# Patient Record
Sex: MALE | Race: WHITE | Employment: OTHER | URBAN - METROPOLITAN AREA
[De-identification: names, ages, dates, MRNs, and addresses within clinical notes are randomized per-mention and may not be internally consistent; named-entity substitution may affect disease eponyms.]

---

## 2018-12-27 ENCOUNTER — HOSPITAL ENCOUNTER (OUTPATIENT)
Age: 53
Setting detail: OBSERVATION
Discharge: NURSING, REHAB OR LONG-TERM CARE | End: 2018-12-28
Attending: EMERGENCY MEDICINE | Admitting: INTERNAL MEDICINE
Payer: MEDICAID

## 2018-12-27 ENCOUNTER — APPOINTMENT (OUTPATIENT)
Dept: GENERAL RADIOLOGY | Age: 53
End: 2018-12-27
Payer: MEDICAID

## 2018-12-27 DIAGNOSIS — R77.8 ELEVATED TROPONIN: ICD-10-CM

## 2018-12-27 DIAGNOSIS — R07.9 CHEST PAIN, UNSPECIFIED TYPE: Primary | ICD-10-CM

## 2018-12-27 LAB
A/G RATIO: 1.1 (ref 1.1–2.2)
ALBUMIN SERPL-MCNC: 3.8 G/DL (ref 3.4–5)
ALP BLD-CCNC: 139 U/L (ref 40–129)
ALT SERPL-CCNC: 18 U/L (ref 10–40)
ANION GAP SERPL CALCULATED.3IONS-SCNC: 12 MMOL/L (ref 3–16)
AST SERPL-CCNC: 15 U/L (ref 15–37)
BASOPHILS ABSOLUTE: 0.1 K/UL (ref 0–0.2)
BASOPHILS RELATIVE PERCENT: 0.6 %
BILIRUB SERPL-MCNC: 0.3 MG/DL (ref 0–1)
BUN BLDV-MCNC: 12 MG/DL (ref 7–20)
CALCIUM SERPL-MCNC: 8.7 MG/DL (ref 8.3–10.6)
CHLORIDE BLD-SCNC: 100 MMOL/L (ref 99–110)
CO2: 25 MMOL/L (ref 21–32)
CREAT SERPL-MCNC: 0.5 MG/DL (ref 0.9–1.3)
EOSINOPHILS ABSOLUTE: 0 K/UL (ref 0–0.6)
EOSINOPHILS RELATIVE PERCENT: 0.1 %
GFR AFRICAN AMERICAN: >60
GFR NON-AFRICAN AMERICAN: >60
GLOBULIN: 3.5 G/DL
GLUCOSE BLD-MCNC: 148 MG/DL (ref 70–99)
GLUCOSE BLD-MCNC: 171 MG/DL (ref 70–99)
HCT VFR BLD CALC: 34.6 % (ref 40.5–52.5)
HEMOGLOBIN: 11.2 G/DL (ref 13.5–17.5)
INR BLD: 2.68 (ref 0.86–1.14)
LYMPHOCYTES ABSOLUTE: 0.7 K/UL (ref 1–5.1)
LYMPHOCYTES RELATIVE PERCENT: 6.3 %
MCH RBC QN AUTO: 28.9 PG (ref 26–34)
MCHC RBC AUTO-ENTMCNC: 32.5 G/DL (ref 31–36)
MCV RBC AUTO: 88.9 FL (ref 80–100)
MONOCYTES ABSOLUTE: 0.3 K/UL (ref 0–1.3)
MONOCYTES RELATIVE PERCENT: 2.7 %
NEUTROPHILS ABSOLUTE: 9.7 K/UL (ref 1.7–7.7)
NEUTROPHILS RELATIVE PERCENT: 90.3 %
PDW BLD-RTO: 19.2 % (ref 12.4–15.4)
PERFORMED ON: ABNORMAL
PLATELET # BLD: 268 K/UL (ref 135–450)
PMV BLD AUTO: 9.7 FL (ref 5–10.5)
POTASSIUM REFLEX MAGNESIUM: 3.9 MMOL/L (ref 3.5–5.1)
PROTHROMBIN TIME: 30.6 SEC (ref 9.8–13)
RBC # BLD: 3.89 M/UL (ref 4.2–5.9)
SODIUM BLD-SCNC: 137 MMOL/L (ref 136–145)
TOTAL PROTEIN: 7.3 G/DL (ref 6.4–8.2)
TROPONIN: 0.03 NG/ML
TROPONIN: <0.01 NG/ML
WBC # BLD: 10.8 K/UL (ref 4–11)

## 2018-12-27 PROCEDURE — 93005 ELECTROCARDIOGRAM TRACING: CPT | Performed by: NURSE PRACTITIONER

## 2018-12-27 PROCEDURE — G0378 HOSPITAL OBSERVATION PER HR: HCPCS

## 2018-12-27 PROCEDURE — 85610 PROTHROMBIN TIME: CPT

## 2018-12-27 PROCEDURE — 6370000000 HC RX 637 (ALT 250 FOR IP): Performed by: NURSE PRACTITIONER

## 2018-12-27 PROCEDURE — 85025 COMPLETE CBC W/AUTO DIFF WBC: CPT

## 2018-12-27 PROCEDURE — 99285 EMERGENCY DEPT VISIT HI MDM: CPT

## 2018-12-27 PROCEDURE — 80053 COMPREHEN METABOLIC PANEL: CPT

## 2018-12-27 PROCEDURE — 84484 ASSAY OF TROPONIN QUANT: CPT

## 2018-12-27 PROCEDURE — 71046 X-RAY EXAM CHEST 2 VIEWS: CPT

## 2018-12-27 PROCEDURE — 36415 COLL VENOUS BLD VENIPUNCTURE: CPT

## 2018-12-27 RX ORDER — CETIRIZINE HYDROCHLORIDE 10 MG/1
10 TABLET ORAL DAILY
Status: DISCONTINUED | OUTPATIENT
Start: 2018-12-28 | End: 2018-12-28 | Stop reason: HOSPADM

## 2018-12-27 RX ORDER — BENZONATATE 100 MG/1
100 CAPSULE ORAL EVERY 6 HOURS PRN
Status: ON HOLD | COMMUNITY
End: 2020-03-27

## 2018-12-27 RX ORDER — BACLOFEN 10 MG/1
10 TABLET ORAL 3 TIMES DAILY
COMMUNITY

## 2018-12-27 RX ORDER — WARFARIN SODIUM 5 MG/1
5 TABLET ORAL EVERY EVENING
Status: ON HOLD | COMMUNITY
End: 2020-03-27

## 2018-12-27 RX ORDER — FAMOTIDINE 20 MG/1
20 TABLET, FILM COATED ORAL EVERY EVENING
COMMUNITY

## 2018-12-27 RX ORDER — ATENOLOL 25 MG/1
25 TABLET ORAL DAILY
Status: DISCONTINUED | OUTPATIENT
Start: 2018-12-28 | End: 2018-12-28 | Stop reason: HOSPADM

## 2018-12-27 RX ORDER — LOSARTAN POTASSIUM 25 MG/1
25 TABLET ORAL DAILY
COMMUNITY

## 2018-12-27 RX ORDER — OXYCODONE HYDROCHLORIDE AND ACETAMINOPHEN 5; 325 MG/1; MG/1
2 TABLET ORAL EVERY 4 HOURS PRN
Status: DISCONTINUED | OUTPATIENT
Start: 2018-12-27 | End: 2018-12-28 | Stop reason: HOSPADM

## 2018-12-27 RX ORDER — MIRTAZAPINE 15 MG/1
15 TABLET, FILM COATED ORAL NIGHTLY
Status: ON HOLD | COMMUNITY
End: 2020-03-27

## 2018-12-27 RX ORDER — DEXTROSE MONOHYDRATE 25 G/50ML
12.5 INJECTION, SOLUTION INTRAVENOUS PRN
Status: DISCONTINUED | OUTPATIENT
Start: 2018-12-27 | End: 2018-12-28 | Stop reason: HOSPADM

## 2018-12-27 RX ORDER — ATENOLOL 25 MG/1
25 TABLET ORAL DAILY
COMMUNITY

## 2018-12-27 RX ORDER — LOSARTAN POTASSIUM 25 MG/1
25 TABLET ORAL DAILY
Status: DISCONTINUED | OUTPATIENT
Start: 2018-12-28 | End: 2018-12-28 | Stop reason: HOSPADM

## 2018-12-27 RX ORDER — FERROUS SULFATE 325(65) MG
325 TABLET ORAL
Status: ON HOLD | COMMUNITY
Start: 2018-12-22 | End: 2020-03-27

## 2018-12-27 RX ORDER — ASPIRIN 81 MG/1
324 TABLET, CHEWABLE ORAL ONCE
Status: DISCONTINUED | OUTPATIENT
Start: 2018-12-27 | End: 2018-12-27 | Stop reason: HOSPADM

## 2018-12-27 RX ORDER — M-VIT,TX,IRON,MINS/CALC/FOLIC 27MG-0.4MG
1 TABLET ORAL DAILY
Status: DISCONTINUED | OUTPATIENT
Start: 2018-12-28 | End: 2018-12-28 | Stop reason: HOSPADM

## 2018-12-27 RX ORDER — LANOLIN ALCOHOL/MO/W.PET/CERES
3 CREAM (GRAM) TOPICAL EVERY EVENING
Status: DISCONTINUED | OUTPATIENT
Start: 2018-12-27 | End: 2018-12-28 | Stop reason: HOSPADM

## 2018-12-27 RX ORDER — FUROSEMIDE 20 MG/1
20 TABLET ORAL DAILY
Status: DISCONTINUED | OUTPATIENT
Start: 2018-12-28 | End: 2018-12-28 | Stop reason: HOSPADM

## 2018-12-27 RX ORDER — NITROGLYCERIN 0.4 MG/1
0.4 TABLET SUBLINGUAL EVERY 5 MIN PRN
Status: DISCONTINUED | OUTPATIENT
Start: 2018-12-27 | End: 2018-12-28 | Stop reason: HOSPADM

## 2018-12-27 RX ORDER — ASCORBIC ACID 500 MG
500 TABLET ORAL 3 TIMES DAILY
Status: ON HOLD | COMMUNITY
End: 2020-03-27

## 2018-12-27 RX ORDER — GABAPENTIN 300 MG/1
600 CAPSULE ORAL 3 TIMES DAILY
Status: DISCONTINUED | OUTPATIENT
Start: 2018-12-27 | End: 2018-12-28 | Stop reason: HOSPADM

## 2018-12-27 RX ORDER — DEXTROSE MONOHYDRATE 50 MG/ML
100 INJECTION, SOLUTION INTRAVENOUS PRN
Status: DISCONTINUED | OUTPATIENT
Start: 2018-12-27 | End: 2018-12-28 | Stop reason: HOSPADM

## 2018-12-27 RX ORDER — LOPERAMIDE HYDROCHLORIDE 2 MG/1
2 CAPSULE ORAL 4 TIMES DAILY PRN
COMMUNITY

## 2018-12-27 RX ORDER — SODIUM CHLORIDE 0.9 % (FLUSH) 0.9 %
10 SYRINGE (ML) INJECTION PRN
Status: DISCONTINUED | OUTPATIENT
Start: 2018-12-27 | End: 2018-12-28 | Stop reason: HOSPADM

## 2018-12-27 RX ORDER — ATORVASTATIN CALCIUM 20 MG/1
20 TABLET, FILM COATED ORAL NIGHTLY
COMMUNITY

## 2018-12-27 RX ORDER — WARFARIN SODIUM 4 MG/1
4 TABLET ORAL EVERY EVENING
Status: ON HOLD | COMMUNITY
End: 2020-03-27

## 2018-12-27 RX ORDER — M-VIT,TX,IRON,MINS/CALC/FOLIC 27MG-0.4MG
1 TABLET ORAL DAILY
COMMUNITY

## 2018-12-27 RX ORDER — OXYCODONE HYDROCHLORIDE AND ACETAMINOPHEN 5; 325 MG/1; MG/1
1 TABLET ORAL EVERY 4 HOURS PRN
Status: DISCONTINUED | OUTPATIENT
Start: 2018-12-27 | End: 2018-12-28 | Stop reason: HOSPADM

## 2018-12-27 RX ORDER — OXYCODONE HYDROCHLORIDE AND ACETAMINOPHEN 5; 325 MG/1; MG/1
1 TABLET ORAL EVERY 8 HOURS PRN
COMMUNITY

## 2018-12-27 RX ORDER — ASPIRIN 81 MG/1
81 TABLET, CHEWABLE ORAL DAILY
Status: DISCONTINUED | OUTPATIENT
Start: 2018-12-28 | End: 2018-12-28 | Stop reason: HOSPADM

## 2018-12-27 RX ORDER — ATORVASTATIN CALCIUM 20 MG/1
20 TABLET, FILM COATED ORAL NIGHTLY
Status: DISCONTINUED | OUTPATIENT
Start: 2018-12-27 | End: 2018-12-28 | Stop reason: HOSPADM

## 2018-12-27 RX ORDER — LANOLIN ALCOHOL/MO/W.PET/CERES
3 CREAM (GRAM) TOPICAL EVERY EVENING
COMMUNITY

## 2018-12-27 RX ORDER — INSULIN GLARGINE 100 [IU]/ML
35 INJECTION, SOLUTION SUBCUTANEOUS NIGHTLY
Status: DISCONTINUED | OUTPATIENT
Start: 2018-12-27 | End: 2018-12-28 | Stop reason: HOSPADM

## 2018-12-27 RX ORDER — NICOTINE POLACRILEX 4 MG
15 LOZENGE BUCCAL PRN
Status: DISCONTINUED | OUTPATIENT
Start: 2018-12-27 | End: 2018-12-28 | Stop reason: HOSPADM

## 2018-12-27 RX ORDER — TRAZODONE HYDROCHLORIDE 50 MG/1
12.5 TABLET ORAL NIGHTLY
COMMUNITY

## 2018-12-27 RX ORDER — SODIUM CHLORIDE 0.9 % (FLUSH) 0.9 %
10 SYRINGE (ML) INJECTION EVERY 12 HOURS SCHEDULED
Status: DISCONTINUED | OUTPATIENT
Start: 2018-12-27 | End: 2018-12-28 | Stop reason: HOSPADM

## 2018-12-27 RX ORDER — ASCORBIC ACID 500 MG
500 TABLET ORAL 3 TIMES DAILY
Status: DISCONTINUED | OUTPATIENT
Start: 2018-12-27 | End: 2018-12-28 | Stop reason: HOSPADM

## 2018-12-27 RX ORDER — ATORVASTATIN CALCIUM 40 MG/1
40 TABLET, FILM COATED ORAL NIGHTLY
Status: DISCONTINUED | OUTPATIENT
Start: 2018-12-27 | End: 2018-12-28 | Stop reason: HOSPADM

## 2018-12-27 RX ORDER — ACETAMINOPHEN 325 MG/1
325-650 TABLET ORAL EVERY 6 HOURS PRN
Status: ON HOLD | COMMUNITY
End: 2020-03-27

## 2018-12-27 RX ORDER — OXYBUTYNIN CHLORIDE 5 MG/1
5 TABLET, EXTENDED RELEASE ORAL DAILY
Status: DISCONTINUED | OUTPATIENT
Start: 2018-12-28 | End: 2018-12-28 | Stop reason: HOSPADM

## 2018-12-27 RX ORDER — ONDANSETRON 4 MG/1
4 TABLET, FILM COATED ORAL EVERY 8 HOURS PRN
COMMUNITY

## 2018-12-27 RX ORDER — FUROSEMIDE 20 MG/1
20 TABLET ORAL DAILY
Status: ON HOLD | COMMUNITY
End: 2020-03-30 | Stop reason: HOSPADM

## 2018-12-27 RX ORDER — LORATADINE 10 MG/1
10 TABLET ORAL DAILY
Status: ON HOLD | COMMUNITY
End: 2020-03-27

## 2018-12-27 RX ORDER — MIRTAZAPINE 15 MG/1
15 TABLET, FILM COATED ORAL NIGHTLY
Status: DISCONTINUED | OUTPATIENT
Start: 2018-12-27 | End: 2018-12-28 | Stop reason: HOSPADM

## 2018-12-27 RX ORDER — OXYBUTYNIN CHLORIDE 5 MG/1
5 TABLET, EXTENDED RELEASE ORAL DAILY
COMMUNITY

## 2018-12-27 RX ORDER — FERROUS SULFATE TAB EC 324 MG (65 MG FE EQUIVALENT) 324 (65 FE) MG
324 TABLET DELAYED RESPONSE ORAL
Status: DISCONTINUED | OUTPATIENT
Start: 2018-12-28 | End: 2018-12-28 | Stop reason: HOSPADM

## 2018-12-27 RX ORDER — NICOTINE POLACRILEX 4 MG
15 LOZENGE BUCCAL PRN
COMMUNITY

## 2018-12-27 RX ORDER — ONDANSETRON 2 MG/ML
4 INJECTION INTRAMUSCULAR; INTRAVENOUS EVERY 6 HOURS PRN
Status: DISCONTINUED | OUTPATIENT
Start: 2018-12-27 | End: 2018-12-28 | Stop reason: HOSPADM

## 2018-12-27 RX ORDER — ACETAMINOPHEN 325 MG/1
650 TABLET ORAL EVERY 4 HOURS PRN
Status: DISCONTINUED | OUTPATIENT
Start: 2018-12-27 | End: 2018-12-28 | Stop reason: HOSPADM

## 2018-12-27 RX ORDER — FAMOTIDINE 20 MG/1
20 TABLET, FILM COATED ORAL 2 TIMES DAILY
Status: DISCONTINUED | OUTPATIENT
Start: 2018-12-27 | End: 2018-12-28 | Stop reason: HOSPADM

## 2018-12-27 RX ORDER — GABAPENTIN 300 MG/1
600 CAPSULE ORAL 3 TIMES DAILY
COMMUNITY

## 2018-12-27 RX ORDER — TRAZODONE HYDROCHLORIDE 50 MG/1
50 TABLET ORAL NIGHTLY
Status: DISCONTINUED | OUTPATIENT
Start: 2018-12-27 | End: 2018-12-28 | Stop reason: HOSPADM

## 2018-12-27 RX ORDER — BACLOFEN 10 MG/1
10 TABLET ORAL 3 TIMES DAILY
Status: DISCONTINUED | OUTPATIENT
Start: 2018-12-27 | End: 2018-12-28 | Stop reason: HOSPADM

## 2018-12-27 RX ADMIN — NITROGLYCERIN 0.5 INCH: 20 OINTMENT TOPICAL at 17:37

## 2018-12-27 RX ADMIN — TRAZODONE HYDROCHLORIDE 50 MG: 50 TABLET ORAL at 23:41

## 2018-12-27 RX ADMIN — Medication 3 MG: at 23:49

## 2018-12-27 RX ADMIN — OXYCODONE AND ACETAMINOPHEN 2 TABLET: 5; 325 TABLET ORAL at 23:59

## 2018-12-27 RX ADMIN — BACLOFEN 10 MG: 10 TABLET ORAL at 23:41

## 2018-12-27 RX ADMIN — GABAPENTIN 600 MG: 300 CAPSULE ORAL at 23:40

## 2018-12-27 RX ADMIN — FAMOTIDINE 20 MG: 20 TABLET ORAL at 23:42

## 2018-12-27 RX ADMIN — ATORVASTATIN CALCIUM 40 MG: 40 TABLET, FILM COATED ORAL at 23:42

## 2018-12-27 RX ADMIN — ATORVASTATIN CALCIUM 20 MG: 20 TABLET, FILM COATED ORAL at 23:48

## 2018-12-27 RX ADMIN — INSULIN GLARGINE 35 UNITS: 100 INJECTION, SOLUTION SUBCUTANEOUS at 23:43

## 2018-12-27 RX ADMIN — OXYCODONE HYDROCHLORIDE AND ACETAMINOPHEN 500 MG: 500 TABLET ORAL at 23:59

## 2018-12-27 RX ADMIN — MIRTAZAPINE 15 MG: 15 TABLET, FILM COATED ORAL at 23:42

## 2018-12-27 ASSESSMENT — ENCOUNTER SYMPTOMS
VOMITING: 0
CHEST TIGHTNESS: 0
RESPIRATORY NEGATIVE: 1
BACK PAIN: 0
NAUSEA: 0
COUGH: 0
SHORTNESS OF BREATH: 0
GASTROINTESTINAL NEGATIVE: 1
DIARRHEA: 0

## 2018-12-27 ASSESSMENT — PAIN SCALES - GENERAL
PAINLEVEL_OUTOF10: 10
PAINLEVEL_OUTOF10: 0

## 2018-12-27 ASSESSMENT — HEART SCORE: ECG: 1

## 2018-12-27 NOTE — ED PROVIDER NOTES
not disoriented. GCS eye subscore is 4. GCS verbal subscore is 5. GCS motor subscore is 6. Skin: Skin is warm and dry. Capillary refill takes 2 to 3 seconds. Lesion noted. No rash noted. He is not diaphoretic. No erythema. No pallor. Patient has a self-reported stage IV pressure ulcer on his buttocks. HEIDI drain is in place and draining. No wound VAC in place. She does receive outpatient wound care treatment for this ongoing chronic issue. Psychiatric: He has a normal mood and affect. His speech is normal and behavior is normal. Judgment and thought content normal. Cognition and memory are normal.   Nursing note and vitals reviewed.       DIAGNOSTIC RESULTS   LABS:    Labs Reviewed   CBC WITH AUTO DIFFERENTIAL - Abnormal; Notable for the following:        Result Value    RBC 3.89 (*)     Hemoglobin 11.2 (*)     Hematocrit 34.6 (*)     RDW 19.2 (*)     Neutrophils # 9.7 (*)     Lymphocytes # 0.7 (*)     All other components within normal limits    Narrative:     Performed at:  [place] [address]   Phone [phone]   COMPREHENSIVE METABOLIC PANEL W/ REFLEX TO MG FOR LOW K - Abnormal; Notable for the following:     Glucose 171 (*)     CREATININE 0.5 (*)     Alkaline Phosphatase 139 (*)     All other components within normal limits    Narrative:     Performed at:  [address] [address]   Phone [phone]   TROPONIN - Abnormal; Notable for the following:     Troponin 0.03 (*)     All other components within normal limits    Narrative:     Performed at:  [address] [address]   Phone [phone]   PROTIME-INR - Abnormal; Notable for the following:     Protime 30.6 (*)     INR 2.68 (*)     All other components within normal limits    Narrative:     Performed at:  [facility]  5205 administered)   glucose (GLUTOSE) 40 % oral gel 15 g (not administered)   dextrose 50 % solution 12.5 g (not administered)   glucagon (rDNA) injection 1 mg (not administered)   dextrose 5 % solution (not administered)   insulin lispro (HUMALOG) injection vial 0-12 Units (not administered)   insulin lispro (HUMALOG) injection vial 0-6 Units (not administered)   nitroglycerin (NITRO-BID) 2 % ointment 0.5 inch (0.5 inches Topical Given 12/27/18 1737)       MDM: Patient was seen and evaluated per myself in conjunction with ED attending Dr. Cristy Tidwell. See HPI above for full presentation. Patient is a 57-year-old male that presents the ED today with complaints of chest pain that started spontaneously when he was lying in bed watching TV. Patient denies any associated signs or symptoms including shortness of breath, nausea, vomiting, fever, chills, cough. Patient states that he has multiple comorbidities and keeps focusing on a stage IV pressure ulcer on his buttocks that has needed frequent washout and debridement no R. Patient does have a HEIDI drain in place as well as a indwelling Galindo catheter. Patient states that he has never had this before. Patient did receive 324 mg ASA and wine of legal nitroglycerin tablet prior to arrival to the ED. She denies any relief with these. Upon physical exam patient is resting comfortably in the bed, hemodynamic stable, nontoxic in appearance, in no acute distress. Cardiac RRR. Distal pulses equal and palpable bilaterally. Patient is a paraplegic. Lungs sounds clear to auscultation throughout all lung fields. Other than stage IV pressure ulcer that was noted to the buttocks I did not note any other skin on modalities. HEIDI drain is in place and is patent. Galindo catheter is in place. Physical exam is otherwise benign. Due to patient's HPI and physical exam and lateral outer blood work, EKG, chest x-ray and will order nitroglycerin paste.     Differential diagnoses include ACS, musculoskeletal pain, pneumonia, GERD, pericarditis. CBC does show no leukocytosis however does show a slight elevation in the absolute neutrophils at 9.7, absolute lymphocytes 0.7. Patient has a stable hemoglobin and hematocrit at 11.2/34. 6. RBC 3.89, RDW 19.2. Otherwise unremarkable CBC. CMP shows blood glucose slightly elevated at 171, creatinine 0.5. Alk phos 139. Otherwise no electrolyte derangements. No SIS. No other abnormalities on the CMP. Troponin is slightly elevated at 0.03. I do not know whether this is chronically elevated or if this is a acute elevation as patient does not have any past troponin levels within care everywhere. Patient also does not have any past cardiology testing be a care everywhere. PT/INR is elevated as expected on warfarin. PT 30.6, INR 2.68. She is anticoagulated for history of venous thrombosis. Chest x-ray as read by the radiologist and reviewed per myself shows no active pulmonary disease. Although patient has remained hemodynamically stable throughout his entire ED course with the elevation in troponin as well as a heart score of 5 I do believe that he needs to remain in the hospital for further evaluation and testing. I did consult with my attending Dr. Kenia Villafuerte who agrees. I did therefore consult the hospitalist for admission. Dr. Zack Dhaliwal agreed to admit patient and write orders for admission. FINAL IMPRESSION      1. Chest pain, unspecified type    2.  Elevated troponin          DISPOSITION/PLAN   DISPOSITION        PATIENT REFERREDTO:  Unknown Provider Result            DISCHARGE MEDICATIONS:  New Prescriptions    No medications on file       DISCONTINUED MEDICATIONS:  Discontinued Medications    No medications on file              (Please note that portions ofthis note were completed with a voice recognition program.  Efforts were made to edit the dictations but occasionally words are mis-transcribed.)    TRAVIS Moreno - CNP (electronically signed)           TRAVIS Martinez - CNP  12/27/18 1940

## 2018-12-28 VITALS
HEIGHT: 68 IN | DIASTOLIC BLOOD PRESSURE: 79 MMHG | SYSTOLIC BLOOD PRESSURE: 122 MMHG | WEIGHT: 187.17 LBS | OXYGEN SATURATION: 96 % | TEMPERATURE: 98.4 F | BODY MASS INDEX: 28.37 KG/M2 | HEART RATE: 85 BPM | RESPIRATION RATE: 16 BRPM

## 2018-12-28 PROBLEM — I10 HTN (HYPERTENSION): Status: ACTIVE | Noted: 2018-12-28

## 2018-12-28 PROBLEM — R07.9 CHEST PAIN: Status: RESOLVED | Noted: 2018-12-27 | Resolved: 2018-12-28

## 2018-12-28 PROBLEM — E11.9 DM2 (DIABETES MELLITUS, TYPE 2) (HCC): Status: ACTIVE | Noted: 2018-12-28

## 2018-12-28 LAB
ANION GAP SERPL CALCULATED.3IONS-SCNC: 14 MMOL/L (ref 3–16)
BUN BLDV-MCNC: 10 MG/DL (ref 7–20)
CALCIUM SERPL-MCNC: 8.5 MG/DL (ref 8.3–10.6)
CHLORIDE BLD-SCNC: 101 MMOL/L (ref 99–110)
CHOLESTEROL, TOTAL: 165 MG/DL (ref 0–199)
CO2: 24 MMOL/L (ref 21–32)
CREAT SERPL-MCNC: <0.5 MG/DL (ref 0.9–1.3)
ESTIMATED AVERAGE GLUCOSE: 114 MG/DL
GFR AFRICAN AMERICAN: >60
GFR NON-AFRICAN AMERICAN: >60
GLUCOSE BLD-MCNC: 153 MG/DL (ref 70–99)
GLUCOSE BLD-MCNC: 163 MG/DL (ref 70–99)
GLUCOSE BLD-MCNC: 191 MG/DL (ref 70–99)
HBA1C MFR BLD: 5.6 %
HCT VFR BLD CALC: 33 % (ref 40.5–52.5)
HDLC SERPL-MCNC: 34 MG/DL (ref 40–60)
HEMOGLOBIN: 10.7 G/DL (ref 13.5–17.5)
INR BLD: 2.46 (ref 0.86–1.14)
LDL CHOLESTEROL CALCULATED: 97 MG/DL
MAGNESIUM: 2.1 MG/DL (ref 1.8–2.4)
MCH RBC QN AUTO: 29 PG (ref 26–34)
MCHC RBC AUTO-ENTMCNC: 32.3 G/DL (ref 31–36)
MCV RBC AUTO: 89.7 FL (ref 80–100)
PDW BLD-RTO: 19.7 % (ref 12.4–15.4)
PERFORMED ON: ABNORMAL
PERFORMED ON: ABNORMAL
PLATELET # BLD: 242 K/UL (ref 135–450)
PMV BLD AUTO: 10.1 FL (ref 5–10.5)
POTASSIUM REFLEX MAGNESIUM: 3.3 MMOL/L (ref 3.5–5.1)
PROTHROMBIN TIME: 28.1 SEC (ref 9.8–13)
RBC # BLD: 3.68 M/UL (ref 4.2–5.9)
SODIUM BLD-SCNC: 139 MMOL/L (ref 136–145)
TRIGL SERPL-MCNC: 171 MG/DL (ref 0–150)
TROPONIN: <0.01 NG/ML
VLDLC SERPL CALC-MCNC: 34 MG/DL
WBC # BLD: 6.8 K/UL (ref 4–11)

## 2018-12-28 PROCEDURE — 6370000000 HC RX 637 (ALT 250 FOR IP): Performed by: INTERNAL MEDICINE

## 2018-12-28 PROCEDURE — 80048 BASIC METABOLIC PNL TOTAL CA: CPT

## 2018-12-28 PROCEDURE — 94760 N-INVAS EAR/PLS OXIMETRY 1: CPT

## 2018-12-28 PROCEDURE — 83735 ASSAY OF MAGNESIUM: CPT

## 2018-12-28 PROCEDURE — 83036 HEMOGLOBIN GLYCOSYLATED A1C: CPT

## 2018-12-28 PROCEDURE — 85027 COMPLETE CBC AUTOMATED: CPT

## 2018-12-28 PROCEDURE — 6370000000 HC RX 637 (ALT 250 FOR IP): Performed by: NURSE PRACTITIONER

## 2018-12-28 PROCEDURE — 84484 ASSAY OF TROPONIN QUANT: CPT

## 2018-12-28 PROCEDURE — 36415 COLL VENOUS BLD VENIPUNCTURE: CPT

## 2018-12-28 PROCEDURE — 80061 LIPID PANEL: CPT

## 2018-12-28 PROCEDURE — G0378 HOSPITAL OBSERVATION PER HR: HCPCS

## 2018-12-28 PROCEDURE — 99245 OFF/OP CONSLTJ NEW/EST HI 55: CPT | Performed by: INTERNAL MEDICINE

## 2018-12-28 PROCEDURE — 2580000003 HC RX 258: Performed by: NURSE PRACTITIONER

## 2018-12-28 PROCEDURE — 93010 ELECTROCARDIOGRAM REPORT: CPT | Performed by: INTERNAL MEDICINE

## 2018-12-28 PROCEDURE — 85610 PROTHROMBIN TIME: CPT

## 2018-12-28 RX ORDER — POTASSIUM CHLORIDE 20 MEQ/1
40 TABLET, EXTENDED RELEASE ORAL ONCE
Status: COMPLETED | OUTPATIENT
Start: 2018-12-28 | End: 2018-12-28

## 2018-12-28 RX ORDER — ASPIRIN 81 MG/1
81 TABLET, CHEWABLE ORAL DAILY
Qty: 30 TABLET | Refills: 3 | Status: CANCELLED | OUTPATIENT
Start: 2018-12-29

## 2018-12-28 RX ADMIN — OXYCODONE HYDROCHLORIDE AND ACETAMINOPHEN 500 MG: 500 TABLET ORAL at 14:42

## 2018-12-28 RX ADMIN — OXYCODONE AND ACETAMINOPHEN 2 TABLET: 5; 325 TABLET ORAL at 10:26

## 2018-12-28 RX ADMIN — Medication 10 ML: at 00:02

## 2018-12-28 RX ADMIN — FERROUS SULFATE TAB EC 324 MG (65 MG FE EQUIVALENT) 324 MG: 324 (65 FE) TABLET DELAYED RESPONSE at 14:42

## 2018-12-28 RX ADMIN — OXYCODONE HYDROCHLORIDE AND ACETAMINOPHEN 500 MG: 500 TABLET ORAL at 10:04

## 2018-12-28 RX ADMIN — FAMOTIDINE 20 MG: 20 TABLET ORAL at 10:02

## 2018-12-28 RX ADMIN — GABAPENTIN 600 MG: 300 CAPSULE ORAL at 14:41

## 2018-12-28 RX ADMIN — BACLOFEN 10 MG: 10 TABLET ORAL at 10:03

## 2018-12-28 RX ADMIN — FERROUS SULFATE TAB EC 324 MG (65 MG FE EQUIVALENT) 324 MG: 324 (65 FE) TABLET DELAYED RESPONSE at 17:33

## 2018-12-28 RX ADMIN — ASPIRIN 81 MG 81 MG: 81 TABLET ORAL at 10:03

## 2018-12-28 RX ADMIN — LOSARTAN POTASSIUM 25 MG: 25 TABLET ORAL at 10:04

## 2018-12-28 RX ADMIN — INSULIN LISPRO 2 UNITS: 100 INJECTION, SOLUTION INTRAVENOUS; SUBCUTANEOUS at 12:56

## 2018-12-28 RX ADMIN — ATENOLOL 25 MG: 25 TABLET ORAL at 10:25

## 2018-12-28 RX ADMIN — INSULIN LISPRO 2 UNITS: 100 INJECTION, SOLUTION INTRAVENOUS; SUBCUTANEOUS at 17:33

## 2018-12-28 RX ADMIN — BACLOFEN 10 MG: 10 TABLET ORAL at 14:41

## 2018-12-28 RX ADMIN — CETIRIZINE HYDROCHLORIDE 10 MG: 10 TABLET, FILM COATED ORAL at 10:03

## 2018-12-28 RX ADMIN — HYDROCORTISONE: 25 CREAM TOPICAL at 14:42

## 2018-12-28 RX ADMIN — POTASSIUM CHLORIDE 40 MEQ: 20 TABLET, EXTENDED RELEASE ORAL at 10:02

## 2018-12-28 RX ADMIN — GABAPENTIN 600 MG: 300 CAPSULE ORAL at 10:03

## 2018-12-28 RX ADMIN — MULTIPLE VITAMINS W/ MINERALS TAB 1 TABLET: TAB at 10:03

## 2018-12-28 RX ADMIN — HYDROCORTISONE: 25 CREAM TOPICAL at 10:30

## 2018-12-28 RX ADMIN — OXYBUTYNIN CHLORIDE 5 MG: 5 TABLET, EXTENDED RELEASE ORAL at 10:03

## 2018-12-28 RX ADMIN — FUROSEMIDE 20 MG: 20 TABLET ORAL at 10:03

## 2018-12-28 ASSESSMENT — PAIN DESCRIPTION - PROGRESSION: CLINICAL_PROGRESSION: GRADUALLY WORSENING

## 2018-12-28 ASSESSMENT — PAIN DESCRIPTION - DESCRIPTORS: DESCRIPTORS: ACHING

## 2018-12-28 ASSESSMENT — PAIN SCALES - GENERAL
PAINLEVEL_OUTOF10: 3
PAINLEVEL_OUTOF10: 8
PAINLEVEL_OUTOF10: 0

## 2018-12-28 ASSESSMENT — PAIN DESCRIPTION - PAIN TYPE
TYPE: CHRONIC PAIN
TYPE: CHRONIC PAIN

## 2018-12-28 ASSESSMENT — PAIN DESCRIPTION - FREQUENCY: FREQUENCY: CONTINUOUS

## 2018-12-28 ASSESSMENT — PAIN DESCRIPTION - ONSET: ONSET: ON-GOING

## 2018-12-28 NOTE — PLAN OF CARE
Problem: Nutrition  Goal: Optimal nutrition therapy  Outcome: Ongoing  Nutrition Problem: Increased nutrient needs  Intervention: Food and/or Nutrient Delivery: Continue current diet, Start ONS  Nutritional Goals: consume >/= to 50%

## 2018-12-28 NOTE — PROGRESS NOTES
Patient refused specialty bed  State he does not want to be moved since he will be transferred later today back to [facility]

## 2018-12-28 NOTE — DISCHARGE INSTR - COC
None    Nursing Mobility/ADLs:  Walking   Dependent  Transfer  Dependent  Bathing  Assisted  Dressing  Assisted  Toileting  Assisted  Feeding  Independent  Med Admin  Independent  Med Delivery   none    Wound Care Documentation and Therapy:  Wound 12/27/18 (Active)   Wound Other 12/27/2018  9:17 PM   Dressing Status Changed 12/27/2018  9:17 PM   Dressing Changed Changed/New 12/27/2018  9:17 PM   Dressing/Treatment ABD 12/27/2018  9:17 PM   Wound Cleansed Wound cleanser 12/27/2018  9:17 PM   Wound Length (cm) 10 cm 12/27/2018  9:17 PM   Wound Width (cm) 4 cm 12/27/2018  9:17 PM   Wound Surface Area (cm^2) 40 cm^2 12/27/2018  9:17 PM   Drainage Amount Moderate 12/27/2018  9:17 PM   Drainage Description Serosanguinous 12/27/2018  9:17 PM   Margins Attached edges 12/27/2018  9:17 PM   Number of days: 0        Elimination:  Continence:   · Bowel: No  · Bladder: No  Urinary Catheter: Insertion Date: at [facility]   Colostomy/Ileostomy/Ileal Conduit: No       Date of Last BM: not at hospital stay    Intake/Output Summary (Last 24 hours) at 12/28/18 1319  Last data filed at 12/27/18 2239   Gross per 24 hour   Intake              480 ml   Output               20 ml   Net              460 ml     I/O last 3 completed shifts: In: 480 [P.O.:480]  Out: 20 [Drains:20]    Safety Concerns:      At Risk for Falls    Impairments/Disabilities:      Paralysis - bilateral lower legs    Nutrition Therapy:  Current Nutrition Therapy:   - Oral Diet:  Carb Control 5 carbs/meal (2000kcals/day)    Routes of Feeding: Oral  Liquids: No Restrictions  Daily Fluid Restriction: no  Last Modified Barium Swallow with Video (Video Swallowing Test): not done    Treatments at the Time of Hospital Discharge:   Respiratory Treatments: ***  Oxygen Therapy:   Ventilator:        Rehab Therapies:   Weight Bearing Status/Restrictions:non weight bearing  On legs   Other Medical Equipment (for information only, NOT a DME order):  {EQUIPMENT:[id number]}  Other Treatments: ***    Patient's personal belongings (please select all that are sent with patient):  None    RN SIGNATURE:  Electronically signed by Sangeeta Holt RN on 12/28/18 at 4:34 PM    CASE MANAGEMENT/SOCIAL WORK SECTION    Inpatient Status Date: Observation    Readmission Risk Assessment Score:  Readmission Risk              Risk of Unplanned Readmission:        16           Discharging to Facility/ Agency   · Name: [facility]  · Address:  · Phone: [phone]                    · Fax: [phone]      / signature: Electronically signed by POOJA Rios on 12/28/18 at 2:30 PM        PHYSICIAN SECTION    Prognosis: Good    Condition at Discharge: Stable    Recommended Labs or Other Treatments After Discharge: follow-up with PCP    Physician Certification: I certify the above information and transfer of Bebo Herman  is necessary for the continuing treatment of the diagnosis listed and that he requires Assisted Living for greater 30 days.      Update Admission H&P: No change in H&P    PHYSICIAN SIGNATURE:  Electronically signed by Bradford Fair MD on 12/28/18 at 1:19 PM

## 2018-12-28 NOTE — H&P
of 9 mg)   Yes Historical Provider, MD   warfarin (COUMADIN) 5 MG tablet Take 5 mg by mouth every evening (Take with 4 mg tablet for a total daily dose of 9 mg)   Yes Historical Provider, MD   famotidine (PEPCID) 20 MG tablet Take 40 mg by mouth every evening   Yes Historical Provider, MD   ferrous sulfate 325 (65 Fe) MG tablet Take 325 mg by mouth 3 times daily (with meals) 12/22/18 1/5/19 Yes Historical Provider, MD   furosemide (LASIX) 20 MG tablet Take 20 mg by mouth daily   Yes Historical Provider, MD   gabapentin (NEURONTIN) 300 MG capsule Take 600 mg by mouth 3 times daily. .   Yes Historical Provider, MD   glucagon (GLUCAGON EMERGENCY) 1 MG injection Infuse 1 kit intravenously as needed (hypoglycemia)   Yes Historical Provider, MD   glucose (GLUTOSE) 40 % GEL Take 15 g by mouth as needed   Yes Historical Provider, MD   insulin lispro (HUMALOG KWIKPEN) 100 UNIT/ML pen Inject subcutaneously two times per day as per sliding scale: If 0-150= 0 units; less than 60 = call MD; 151-200 = 2 units; 201-250= 4 units; 251-300 = 6 units; 301-350 = 8 units; 351-400 = 10 units; 351-400 = 10 units; greater than 400 = call MD   Yes Historical Provider, MD   hydrocortisone 2.5 % cream Apply topically to face and genitals as needed for dermatitis.    Yes Historical Provider, MD   loperamide (IMODIUM) 2 MG capsule Take 2 mg by mouth 4 times daily as needed for Diarrhea   Yes Historical Provider, MD   loratadine (CLARITIN) 10 MG tablet Take 10 mg by mouth daily   Yes Historical Provider, MD   losartan (COZAAR) 25 MG tablet Take 25 mg by mouth daily   Yes Historical Provider, MD   melatonin 3 MG TABS tablet Take 3 mg by mouth every evening   Yes Historical Provider, MD   mirtazapine (REMERON) 15 MG tablet Take 15 mg by mouth nightly   Yes Historical Provider, MD   oxybutynin (DITROPAN-XL) 5 MG extended release tablet Take 5 mg by mouth daily   Yes Historical Provider, MD   oxyCODONE-acetaminophen (PERCOCET) 5-325 MG per tablet

## 2018-12-28 NOTE — CONSULTS
PRN      Labs:   Recent Labs      12/27/18   1714  12/28/18   0517   WBC  10.8  6.8   HGB  11.2*  10.7*   HCT  34.6*  33.0*   PLT  268  242     Recent Labs      12/27/18   1714  12/28/18   0517   NA  137  139   K  3.9  3.3*   CO2  25  24   BUN  12  10   CREATININE  0.5*  <0.5*   GLUCOSE  171*  163*     No results for input(s): BNP in the last 72 hours. Recent Labs      12/27/18   1714  12/28/18   0746   PROTIME  30.6*  28.1*   INR  2.68*  2.46*     No results for input(s): APTT in the last 72 hours. Recent Labs      12/27/18   1714  12/27/18   2224  12/28/18   0115   TROPONINI  0.03*  <0.01  <0.01     Lab Results   Component Value Date    HDL 34 12/28/2018    LDLCALC 97 12/28/2018    TRIG 171 12/28/2018     Recent Labs      12/27/18   1714   AST  15   ALT  18   LABALBU  3.8         EKG:   Sinus tachycardia  Nonspecific ST-T wave changes      ASSESSMENT AND PLAN:      Chest pain  Isolated event occurring at rest  Doubt angina  No ischemic ST changes or troponin elevation  No occurrence with physical exertion in the recent past    No plans for stress testing at this time  If he has recurrence of pain with exertion, then may consider a pharmacological stress test  May discharge home        Rebecca LINDA  12/28/2018

## 2018-12-28 NOTE — CARE COORDINATION
SOCIAL WORK DISCHARGE SUMMARY:      DISCHARGE DATE:                 12/28/18      DISCHARGE PLACE:                [facility]                REPORT NUMBER:       [phone] ext [phone]               FAX NUMBER:                [phone]      TRANSPORTATION:                [facility] [phone]             TIME:                              5pm      PREFERRED PHARMACY:     At facility      Electronically signed by POOJA Rouse on 12/28/18 at 12:34 PM

## 2018-12-28 NOTE — PROGRESS NOTES
Admitted to 5259 from ED for chest pain and elevated troponin. Oriented to room, unit routine, safety protocol, and plan of care as ordered. To be NPO after MN except sips of water for stress test. Dressing to right hip, buttock area removed and wound cleansed with new dressing applied. Call placed to order Dolphin mattress to take pressure off of flap are. Placed on tele. Sinus tach, verified with [facility]. Continue to monitor. HEIDI drain intact draining sanguinous drainage. Rebbeca Habermann, RN

## 2018-12-29 NOTE — ED PROVIDER NOTES
I independently performed a history and physical on Joaquina Pool. All diagnostic, treatment, and disposition decisions were made by myself in conjunction with the advanced practice provider. Briefly, this is a 48 y.o. male here for chest pain and shortness of breath. On exam, patient's pain started while at rest, and he does have multiple comorbid conditions. Patient's blood work does not show any acute abnormalities, however since troponin is slightly elevated at 0.03.   Patient will require admission for evaluation    For further details of [facility] emergency department encounter, please see documentation by advanced practice provider  Nakul Yo MD  12/29/18 0251

## 2018-12-31 LAB
EKG ATRIAL RATE: 104 BPM
EKG DIAGNOSIS: NORMAL
EKG P AXIS: 11 DEGREES
EKG P-R INTERVAL: 140 MS
EKG Q-T INTERVAL: 338 MS
EKG QRS DURATION: 106 MS
EKG QTC CALCULATION (BAZETT): 444 MS
EKG R AXIS: 32 DEGREES
EKG T AXIS: -10 DEGREES
EKG VENTRICULAR RATE: 104 BPM

## 2019-03-19 ENCOUNTER — HOSPITAL ENCOUNTER (EMERGENCY)
Facility: HOSPITAL | Age: 54
Discharge: HOME | End: 2019-03-19
Attending: INTERNAL MEDICINE

## 2019-03-19 VITALS
HEIGHT: 67 IN | DIASTOLIC BLOOD PRESSURE: 90 MMHG | RESPIRATION RATE: 18 BRPM | OXYGEN SATURATION: 96 % | TEMPERATURE: 98 F | SYSTOLIC BLOOD PRESSURE: 145 MMHG | HEART RATE: 67 BPM | BODY MASS INDEX: 34.84 KG/M2 | WEIGHT: 222 LBS

## 2019-03-19 DIAGNOSIS — G21.19 DRUG-INDUCED PARKINSON'S DISEASE: Primary | ICD-10-CM

## 2019-03-19 DIAGNOSIS — H50.10: ICD-10-CM

## 2019-03-19 PROCEDURE — 99281 EMR DPT VST MAYX REQ PHY/QHP: CPT

## 2019-03-19 RX ORDER — ZIPRASIDONE HYDROCHLORIDE 40 MG/1
20 CAPSULE ORAL 2 TIMES DAILY
COMMUNITY

## 2019-03-19 RX ORDER — LITHIUM CARBONATE 300 MG/1
300 CAPSULE ORAL
COMMUNITY
End: 2019-07-24 | Stop reason: SDUPTHER

## 2019-03-19 RX ORDER — OMEGA-3-ACID ETHYL ESTERS 1 G/1
2 CAPSULE, LIQUID FILLED ORAL 2 TIMES DAILY
COMMUNITY

## 2019-03-19 RX ORDER — TRIAMCINOLONE ACETONIDE 1 MG/G
CREAM TOPICAL 2 TIMES DAILY
COMMUNITY
End: 2019-06-18

## 2019-03-19 RX ORDER — PRAZOSIN HYDROCHLORIDE 1 MG/1
1 CAPSULE ORAL 2 TIMES DAILY
COMMUNITY

## 2019-03-19 RX ORDER — CYCLOBENZAPRINE HCL 5 MG
5 TABLET ORAL 3 TIMES DAILY PRN
COMMUNITY

## 2019-03-19 RX ORDER — HYDROXYZINE HYDROCHLORIDE 25 MG/1
25 TABLET, FILM COATED ORAL 3 TIMES DAILY
COMMUNITY

## 2019-03-19 NOTE — ED PROVIDER NOTES
Encounter Date: 3/19/2019       History     Chief Complaint   Patient presents with    Fall     BILAT HANDS, MID BACK PAIN, STRUCK HEAD     Patient comes in reporting multiple falls causing him to have trauma to his hands and legs.  He has a history of falls going back several years but he has fallen 3 times in the last 3 weeks since being in [place].  He is normally seen in [place] and is treated for bipolar disorder and hypertension.  He was placed on Geodon 40 mg 2 years ago.  He has not noticed any different tremors but states that he has familial tremors which have been worse lately.  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  He has not had any specific treatment for this.  Patient does not describe vertigo.  The room is not spinning.  Virtually always falls backwards.  He is not complaining of any headache or other neurological symptoms.    He has been on lithium for many years with no change in dose.  Last lithium levels were checked prior to coming to [place] there were normal and no changes were registered.          Review of patient's allergies indicates:   Allergen Reactions    Motrin [ibuprofen]     Penicillins      Past Medical History:   Diagnosis Date    Cardiac abnormality     Chronic back pain     Cirrhosis     Depression     Hepatitis C     Schizophrenia      Past Surgical History:   Procedure Laterality Date    ENDOSCOPIC RELEASE OF BOTH CARPAL TUNNELS      TESTICLE SURGERY       No family history on file.  Social History     Tobacco Use    Smoking status: Current Every Day Smoker     Packs/day: 1.00     Types: Cigarettes   Substance Use Topics    Alcohol use: Yes     Frequency: Never     Comment: 3 NIGHTS AGO    Drug use: No     Review of Systems   Constitutional: Negative for fever.   HENT: Negative for sore throat.    Eyes: Positive for visual disturbance.        Lazy eye with diplopia on left gaze.   Respiratory: Negative for shortness of  breath.    Cardiovascular: Negative for chest pain.   Gastrointestinal: Negative for nausea.   Genitourinary: Negative for dysuria.   Musculoskeletal: Negative for back pain.   Skin: Negative for rash.   Neurological: Positive for dizziness and tremors. Negative for weakness.   Hematological: Does not bruise/bleed easily.   Psychiatric/Behavioral: Negative for behavioral problems, decreased concentration and dysphoric mood. The patient is not nervous/anxious.         Patient has a history of bipolar and was placed on Geodon due to voices that the patient kept hearing.  Geodon resolved auditory hallucinations.   All other systems reviewed and are negative.      Physical Exam     Initial Vitals [03/19/19 1557]   BP Pulse Resp Temp SpO2   (!) 145/90 67 18 97.9 °F (36.6 °C) 96 %      MAP       --         Physical Exam    Nursing note and vitals reviewed.  Constitutional: Vital signs are normal. He appears well-developed and well-nourished. He is active and cooperative.   HENT:   Head: Normocephalic and atraumatic.   Right Ear: External ear normal.   Left Ear: External ear normal.   Nose: Nose normal.   Mouth/Throat: Oropharynx is clear and moist.   Eyes: Conjunctivae and lids are normal. Lids are everted and swept, no foreign bodies found.   Patient has normal forward gaze.  He has decreased right lateral gaze with the right eye and has marked in with gaze on left eye movement.  This complete discordance of the left eye on left lateral gaze causing the patient to throw his hands out for balance.  It is reproducible multiple times.  Patient has no nystagmus.  Fundi benign.   Neck: Trachea normal, normal range of motion and full passive range of motion without pain. Neck supple.   Cardiovascular: Normal rate, regular rhythm, S1 normal, S2 normal, normal heart sounds, intact distal pulses and normal pulses.  No extrasystoles are present.    Abdominal: Soft. Normal appearance and bowel sounds are normal.   Musculoskeletal:  Normal range of motion.   Neurological: He is alert and oriented to person, place, and time. He has normal strength and normal reflexes. GCS score is 15. GCS eye subscore is 4. GCS verbal subscore is 5. GCS motor subscore is 6.   Patient has a mildly positive Romberg falling backwards consistently after approximately 5 sec.  He startles and throws his hands out in his eyes open.    He has abnormal toe toe walk but has fairly good balance on each leg independently.  He has no discernible tremor and no discernible stiffness characteristic of Parkinson's.  He has negative dysdiadochokinesia and good cerebellar function. Sensory is intact.    Findings compatible with mild striatal dysfunction characteristic of Parkinsonism, probably induced by the Geodon.   Skin: Skin is warm, dry and intact. Capillary refill takes less than 2 seconds.   Psychiatric: He has a normal mood and affect. His speech is normal and behavior is normal. Cognition and memory are normal.         ED Course   Procedures  Labs Reviewed - No data to display       Imaging Results    None          Medical Decision Making:   ED Management:  Patient presents with multiple falls.  His physical findings are quite interesting with a serious strabismus of his right eye with complete discordant high movement when gazing to the left.  This causes the patient to lose balance and throat was hands out on each occasion.  Independently, he also exhibits a positive Romberg and evidence of motor slowing in the area affecting balance with toe toe walking discordance.    It is my opinion that the patient probably is having fall secondary to the combination of his Geodon affecting his mid brain combined with his strabismus and lateral gaze dysfunction.  Could be that each by itself would not cause falling but that the combination may be.  I explained to the patient that the parkinsonian symptoms caused by Geodon can persist for weeks months years or even prominently but  that a readjustment of this medication is strongly considered.  Other option should be considered by Psychiatry.  I recommend that he stop the Geodon and seek neurology input and/or Neurology input on medication choice.  He has seen a psychiatrist in [place] in the past when here and recommend he see that same psychiatrist.                      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

## 2019-05-13 ENCOUNTER — OFFICE VISIT (OUTPATIENT)
Dept: FAMILY MEDICINE | Facility: CLINIC | Age: 54
End: 2019-05-13
Payer: MEDICARE

## 2019-05-13 ENCOUNTER — LAB VISIT (OUTPATIENT)
Dept: LAB | Facility: HOSPITAL | Age: 54
End: 2019-05-13
Attending: FAMILY MEDICINE
Payer: MEDICARE

## 2019-05-13 VITALS
BODY MASS INDEX: 33.02 KG/M2 | HEART RATE: 70 BPM | RESPIRATION RATE: 20 BRPM | WEIGHT: 217.88 LBS | SYSTOLIC BLOOD PRESSURE: 122 MMHG | HEIGHT: 68 IN | OXYGEN SATURATION: 96 % | DIASTOLIC BLOOD PRESSURE: 83 MMHG

## 2019-05-13 DIAGNOSIS — G62.9 NEUROPATHY: Primary | ICD-10-CM

## 2019-05-13 DIAGNOSIS — K75.9 HEPATITIS: ICD-10-CM

## 2019-05-13 DIAGNOSIS — M25.542 ARTHRALGIA OF BOTH HANDS: ICD-10-CM

## 2019-05-13 DIAGNOSIS — G62.9 NEUROPATHY: ICD-10-CM

## 2019-05-13 DIAGNOSIS — F31.9 BIPOLAR 1 DISORDER: ICD-10-CM

## 2019-05-13 DIAGNOSIS — M25.541 ARTHRALGIA OF BOTH HANDS: ICD-10-CM

## 2019-05-13 LAB
ALBUMIN SERPL BCP-MCNC: 4.2 G/DL (ref 3.5–5.2)
ALP SERPL-CCNC: 65 U/L (ref 55–135)
ALT SERPL W/O P-5'-P-CCNC: 19 U/L (ref 10–44)
ANION GAP SERPL CALC-SCNC: 8 MMOL/L (ref 8–16)
AST SERPL-CCNC: 24 U/L (ref 10–40)
BASOPHILS # BLD AUTO: 0.06 K/UL (ref 0–0.2)
BASOPHILS NFR BLD: 0.8 % (ref 0–1.9)
BILIRUB SERPL-MCNC: 0.5 MG/DL (ref 0.1–1)
BUN SERPL-MCNC: 12 MG/DL (ref 6–20)
CALCIUM SERPL-MCNC: 9.6 MG/DL (ref 8.7–10.5)
CHLORIDE SERPL-SCNC: 108 MMOL/L (ref 95–110)
CHOLEST SERPL-MCNC: 191 MG/DL (ref 120–199)
CHOLEST/HDLC SERPL: 5.6 {RATIO} (ref 2–5)
CO2 SERPL-SCNC: 24 MMOL/L (ref 23–29)
CREAT SERPL-MCNC: 0.9 MG/DL (ref 0.5–1.4)
CRP SERPL-MCNC: 0.06 MG/DL (ref 0–0.75)
DIFFERENTIAL METHOD: ABNORMAL
EOSINOPHIL # BLD AUTO: 0.4 K/UL (ref 0–0.5)
EOSINOPHIL NFR BLD: 4.6 % (ref 0–8)
ERYTHROCYTE [DISTWIDTH] IN BLOOD BY AUTOMATED COUNT: 13.2 % (ref 11.5–14.5)
ERYTHROCYTE [SEDIMENTATION RATE] IN BLOOD BY WESTERGREN METHOD: 1 MM/HR (ref 0–10)
EST. GFR  (AFRICAN AMERICAN): >60 ML/MIN/1.73 M^2
EST. GFR  (NON AFRICAN AMERICAN): >60 ML/MIN/1.73 M^2
ESTIMATED AVG GLUCOSE: 111 MG/DL (ref 68–131)
GLUCOSE SERPL-MCNC: 112 MG/DL (ref 70–110)
HBA1C MFR BLD HPLC: 5.5 % (ref 4.5–6.2)
HCT VFR BLD AUTO: 50.2 % (ref 40–54)
HDLC SERPL-MCNC: 34 MG/DL (ref 40–75)
HDLC SERPL: 17.8 % (ref 20–50)
HGB BLD-MCNC: 17 G/DL (ref 14–18)
IMM GRANULOCYTES # BLD AUTO: 0.01 K/UL (ref 0–0.04)
IMM GRANULOCYTES NFR BLD AUTO: 0.1 % (ref 0–0.5)
INR PPP: 1.1 (ref 0.8–1.2)
LDLC SERPL CALC-MCNC: 107.4 MG/DL (ref 63–159)
LYMPHOCYTES # BLD AUTO: 2.9 K/UL (ref 1–4.8)
LYMPHOCYTES NFR BLD: 37 % (ref 18–48)
MCH RBC QN AUTO: 32 PG (ref 27–31)
MCHC RBC AUTO-ENTMCNC: 33.9 G/DL (ref 32–36)
MCV RBC AUTO: 94 FL (ref 82–98)
MONOCYTES # BLD AUTO: 0.6 K/UL (ref 0.3–1)
MONOCYTES NFR BLD: 7.4 % (ref 4–15)
NEUTROPHILS # BLD AUTO: 3.9 K/UL (ref 1.8–7.7)
NEUTROPHILS NFR BLD: 50.1 % (ref 38–73)
NONHDLC SERPL-MCNC: 157 MG/DL
NRBC BLD-RTO: 0 /100 WBC
PLATELET # BLD AUTO: 202 K/UL (ref 150–350)
PMV BLD AUTO: 9.9 FL (ref 9.2–12.9)
POTASSIUM SERPL-SCNC: 4 MMOL/L (ref 3.5–5.1)
PROT SERPL-MCNC: 7.1 G/DL (ref 6–8.4)
PROTHROMBIN TIME: 12 SEC (ref 9–12.5)
RBC # BLD AUTO: 5.32 M/UL (ref 4.6–6.2)
SODIUM SERPL-SCNC: 140 MMOL/L (ref 136–145)
T4 FREE SERPL-MCNC: 0.88 NG/DL (ref 0.71–1.51)
TRIGL SERPL-MCNC: 248 MG/DL (ref 30–150)
TSH SERPL DL<=0.005 MIU/L-ACNC: 1.42 UIU/ML (ref 0.34–5.6)
URATE SERPL-MCNC: 5.5 MG/DL (ref 3.4–7)
WBC # BLD AUTO: 7.86 K/UL (ref 3.9–12.7)

## 2019-05-13 PROCEDURE — 80061 LIPID PANEL: CPT

## 2019-05-13 PROCEDURE — 85025 COMPLETE CBC W/AUTO DIFF WBC: CPT

## 2019-05-13 PROCEDURE — 99204 OFFICE O/P NEW MOD 45 MIN: CPT | Mod: S$GLB,,, | Performed by: FAMILY MEDICINE

## 2019-05-13 PROCEDURE — 99204 PR OFFICE/OUTPT VISIT, NEW, LEVL IV, 45-59 MIN: ICD-10-PCS | Mod: S$GLB,,, | Performed by: FAMILY MEDICINE

## 2019-05-13 PROCEDURE — 86431 RHEUMATOID FACTOR QUANT: CPT

## 2019-05-13 PROCEDURE — 82607 VITAMIN B-12: CPT

## 2019-05-13 PROCEDURE — 85610 PROTHROMBIN TIME: CPT

## 2019-05-13 PROCEDURE — 99999 PR PBB SHADOW E&M-EST. PATIENT-LVL III: CPT | Mod: PBBFAC,,, | Performed by: FAMILY MEDICINE

## 2019-05-13 PROCEDURE — 86038 ANTINUCLEAR ANTIBODIES: CPT

## 2019-05-13 PROCEDURE — 84443 ASSAY THYROID STIM HORMONE: CPT

## 2019-05-13 PROCEDURE — 3008F BODY MASS INDEX DOCD: CPT | Mod: CPTII,S$GLB,, | Performed by: FAMILY MEDICINE

## 2019-05-13 PROCEDURE — 36415 COLL VENOUS BLD VENIPUNCTURE: CPT

## 2019-05-13 PROCEDURE — 80053 COMPREHEN METABOLIC PANEL: CPT

## 2019-05-13 PROCEDURE — 80178 ASSAY OF LITHIUM: CPT

## 2019-05-13 PROCEDURE — 83036 HEMOGLOBIN GLYCOSYLATED A1C: CPT

## 2019-05-13 PROCEDURE — 3008F PR BODY MASS INDEX (BMI) DOCUMENTED: ICD-10-PCS | Mod: CPTII,S$GLB,, | Performed by: FAMILY MEDICINE

## 2019-05-13 PROCEDURE — 84439 ASSAY OF FREE THYROXINE: CPT

## 2019-05-13 PROCEDURE — 84550 ASSAY OF BLOOD/URIC ACID: CPT

## 2019-05-13 PROCEDURE — 86140 C-REACTIVE PROTEIN: CPT

## 2019-05-13 PROCEDURE — 82746 ASSAY OF FOLIC ACID SERUM: CPT

## 2019-05-13 PROCEDURE — 85651 RBC SED RATE NONAUTOMATED: CPT

## 2019-05-13 PROCEDURE — 99999 PR PBB SHADOW E&M-EST. PATIENT-LVL III: ICD-10-PCS | Mod: PBBFAC,,, | Performed by: FAMILY MEDICINE

## 2019-05-13 RX ORDER — PREGABALIN 75 MG/1
75 CAPSULE ORAL 2 TIMES DAILY
Qty: 60 CAPSULE | Refills: 2 | Status: SHIPPED | OUTPATIENT
Start: 2019-05-13 | End: 2019-06-18

## 2019-05-13 RX ORDER — LIDOCAINE 50 MG/G
PATCH TOPICAL
COMMUNITY
End: 2019-06-18

## 2019-05-14 LAB
ANA SER QL IF: NORMAL
FOLATE SERPL-MCNC: 10.3 NG/ML (ref 4–24)
LITHIUM SERPL-SCNC: <0.1 MMOL/L (ref 0.6–1.2)
RHEUMATOID FACT SERPL-ACNC: <10 IU/ML (ref 0–15)
VIT B12 SERPL-MCNC: 337 PG/ML (ref 210–950)

## 2019-05-16 ENCOUNTER — TELEPHONE (OUTPATIENT)
Dept: FAMILY MEDICINE | Facility: CLINIC | Age: 54
End: 2019-05-16

## 2019-05-16 NOTE — TELEPHONE ENCOUNTER
Pt requesting copy of labs drawn on 05.13.19.   Pt informed that he has appt on 05.29 to discuss, but requesting a copy now.   May I print this for him?   Please advise, thank you.

## 2019-05-21 ENCOUNTER — HOSPITAL ENCOUNTER (EMERGENCY)
Facility: HOSPITAL | Age: 54
Discharge: HOME | End: 2019-05-21
Payer: MEDICARE

## 2019-05-21 VITALS
WEIGHT: 218 LBS | RESPIRATION RATE: 20 BRPM | TEMPERATURE: 98 F | OXYGEN SATURATION: 98 % | DIASTOLIC BLOOD PRESSURE: 84 MMHG | BODY MASS INDEX: 34.21 KG/M2 | HEART RATE: 70 BPM | SYSTOLIC BLOOD PRESSURE: 138 MMHG | HEIGHT: 67 IN

## 2019-05-21 DIAGNOSIS — M54.5 CHRONIC LOW BACK PAIN, UNSPECIFIED BACK PAIN LATERALITY, WITH SCIATICA PRESENCE UNSPECIFIED: Primary | ICD-10-CM

## 2019-05-21 DIAGNOSIS — G89.29 CHRONIC LOW BACK PAIN, UNSPECIFIED BACK PAIN LATERALITY, WITH SCIATICA PRESENCE UNSPECIFIED: Primary | ICD-10-CM

## 2019-05-21 PROCEDURE — 99284 EMERGENCY DEPT VISIT MOD MDM: CPT

## 2019-05-21 PROCEDURE — 63600175 PHARM REV CODE 636 W HCPCS: Performed by: NURSE PRACTITIONER

## 2019-05-21 PROCEDURE — 96372 THER/PROPH/DIAG INJ SC/IM: CPT | Mod: 59

## 2019-05-21 RX ORDER — ORPHENADRINE CITRATE 30 MG/ML
60 INJECTION INTRAMUSCULAR; INTRAVENOUS
Status: COMPLETED | OUTPATIENT
Start: 2019-05-21 | End: 2019-05-21

## 2019-05-21 RX ORDER — KETOROLAC TROMETHAMINE 30 MG/ML
30 INJECTION, SOLUTION INTRAMUSCULAR; INTRAVENOUS
Status: COMPLETED | OUTPATIENT
Start: 2019-05-21 | End: 2019-05-21

## 2019-05-21 RX ADMIN — ORPHENADRINE CITRATE 60 MG: 30 INJECTION INTRAMUSCULAR; INTRAVENOUS at 06:05

## 2019-05-21 RX ADMIN — KETOROLAC TROMETHAMINE 30 MG: 30 INJECTION, SOLUTION INTRAMUSCULAR; INTRAVENOUS at 06:05

## 2019-05-21 NOTE — ED PROVIDER NOTES
Encounter Date: 5/21/2019       History     Chief Complaint   Patient presents with    Back Pain     Braulio Kim is a 54 y.o male with PMHx including Chronic back pain, Cirrhoisis, Depression, Hep C, PTSD, Schizoaffective disorder and schizophrenia. He presents to ED with worsening lower back pain.   Patient with hx of chronic back pain for over 20 years.  He denies injury or trauma.   He is currently taking Flexeril with no relief in symptoms  No urinary symptoms   He denies numbness or tingling. No urinary or bowel inconvenience         Review of patient's allergies indicates:   Allergen Reactions    Motrin [ibuprofen]     Penicillins      Past Medical History:   Diagnosis Date    Cardiac abnormality     Chronic back pain     Cirrhosis     Depression     Hepatitis C     PTSD (post-traumatic stress disorder)     Schizoaffective disorder, bipolar type     Schizophrenia      Past Surgical History:   Procedure Laterality Date    ENDOSCOPIC RELEASE OF BOTH CARPAL TUNNELS      TESTICLE SURGERY       Family History   Problem Relation Age of Onset    Lupus Sister      Social History     Tobacco Use    Smoking status: Current Every Day Smoker     Packs/day: 1.00     Types: Cigarettes   Substance Use Topics    Alcohol use: Yes     Frequency: Never     Comment: 3 NIGHTS AGO    Drug use: No     Review of Systems   Constitutional: Negative.  Negative for fever.   HENT: Negative.  Negative for sore throat.    Eyes: Negative.    Respiratory: Negative.  Negative for shortness of breath.    Cardiovascular: Negative.  Negative for chest pain.   Gastrointestinal: Negative.  Negative for nausea.   Endocrine: Negative.    Genitourinary: Negative.  Negative for dysuria.   Musculoskeletal: Positive for back pain. Negative for neck pain and neck stiffness.   Skin: Negative for rash.   Allergic/Immunologic: Negative.    Neurological: Negative.  Negative for weakness.   Hematological: Negative.  Does not bruise/bleed  easily.   Psychiatric/Behavioral: Negative.    All other systems reviewed and are negative.      Physical Exam     Initial Vitals [05/21/19 1740]   BP Pulse Resp Temp SpO2   138/84 70 20 98.3 °F (36.8 °C) 98 %      MAP       --         Physical Exam    Nursing note and vitals reviewed.  Constitutional: He appears well-developed and well-nourished.   HENT:   Head: Normocephalic.   Cardiovascular: Normal rate.   Pulmonary/Chest: Breath sounds normal.   Abdominal: Soft. Bowel sounds are normal.   Musculoskeletal: He exhibits tenderness.        Lumbar back: He exhibits decreased range of motion, tenderness, bony tenderness, pain and spasm. He exhibits no swelling, no edema, no deformity, no laceration and normal pulse.        Back:    Neurological: He is alert and oriented to person, place, and time. He has normal strength. GCS score is 15. GCS eye subscore is 4. GCS verbal subscore is 5. GCS motor subscore is 6.   Skin: Skin is warm.         ED Course   Procedures  Labs Reviewed - No data to display       Imaging Results    None          Medical Decision Making:   Initial Assessment:   Patient with worsening lower back pain.   Patient with hx of chronic back pain for over 20 years.  He denies injury or trauma.   He is currently taking Flexeril with no relief in symptoms  He denies numbness or tingling. No urinary or bowel inconvenience     Differential Diagnosis:   Chronic low back pain, degenerative changes      UTI  ED Management:  Meds admin      Discussed physical exam findings with patient  No acute emergent medical condition identified at this time to warrant further testing/diagnostics.  Plan to discharge patient home with instructions per AVS.  Follow-up with PCP in 2-5 days or return to ED if symptoms worsen.  Patient verbalized agreement to discharge treatment plan.                      Clinical Impression:       ICD-10-CM ICD-9-CM   1. Chronic low back pain, unspecified back pain laterality, with sciatica  presence unspecified M54.5 724.2    G89.29 338.29                                Orly Kaminski, MJ  05/21/19 2031

## 2019-05-29 ENCOUNTER — OFFICE VISIT (OUTPATIENT)
Dept: FAMILY MEDICINE | Facility: CLINIC | Age: 54
End: 2019-05-29
Payer: MEDICARE

## 2019-05-29 ENCOUNTER — TELEPHONE (OUTPATIENT)
Dept: ORTHOPEDICS | Facility: CLINIC | Age: 54
End: 2019-05-29

## 2019-05-29 VITALS
RESPIRATION RATE: 20 BRPM | BODY MASS INDEX: 34.21 KG/M2 | SYSTOLIC BLOOD PRESSURE: 122 MMHG | HEART RATE: 83 BPM | OXYGEN SATURATION: 96 % | HEIGHT: 67 IN | WEIGHT: 218 LBS | DIASTOLIC BLOOD PRESSURE: 83 MMHG

## 2019-05-29 DIAGNOSIS — M25.529 CHRONIC ELBOW PAIN, UNSPECIFIED LATERALITY: Primary | ICD-10-CM

## 2019-05-29 DIAGNOSIS — K04.7 ABSCESSED TOOTH: ICD-10-CM

## 2019-05-29 DIAGNOSIS — G89.29 CHRONIC ELBOW PAIN, UNSPECIFIED LATERALITY: Primary | ICD-10-CM

## 2019-05-29 PROCEDURE — 99214 OFFICE O/P EST MOD 30 MIN: CPT | Mod: S$GLB,,, | Performed by: FAMILY MEDICINE

## 2019-05-29 PROCEDURE — 99999 PR PBB SHADOW E&M-EST. PATIENT-LVL III: ICD-10-PCS | Mod: PBBFAC,,, | Performed by: FAMILY MEDICINE

## 2019-05-29 PROCEDURE — 3008F PR BODY MASS INDEX (BMI) DOCUMENTED: ICD-10-PCS | Mod: CPTII,S$GLB,, | Performed by: FAMILY MEDICINE

## 2019-05-29 PROCEDURE — 99214 PR OFFICE/OUTPT VISIT, EST, LEVL IV, 30-39 MIN: ICD-10-PCS | Mod: S$GLB,,, | Performed by: FAMILY MEDICINE

## 2019-05-29 PROCEDURE — 3008F BODY MASS INDEX DOCD: CPT | Mod: CPTII,S$GLB,, | Performed by: FAMILY MEDICINE

## 2019-05-29 PROCEDURE — 99999 PR PBB SHADOW E&M-EST. PATIENT-LVL III: CPT | Mod: PBBFAC,,, | Performed by: FAMILY MEDICINE

## 2019-05-29 RX ORDER — HYDROCODONE BITARTRATE AND ACETAMINOPHEN 5; 325 MG/1; MG/1
1 TABLET ORAL EVERY 6 HOURS PRN
Qty: 12 TABLET | Refills: 0 | Status: SHIPPED | OUTPATIENT
Start: 2019-05-29 | End: 2019-07-10

## 2019-05-29 RX ORDER — CLINDAMYCIN HYDROCHLORIDE 300 MG/1
300 CAPSULE ORAL EVERY 6 HOURS
Qty: 40 CAPSULE | Refills: 0 | Status: SHIPPED | OUTPATIENT
Start: 2019-05-29 | End: 2019-06-08

## 2019-05-29 NOTE — TELEPHONE ENCOUNTER
Pt called, received referral from Dr. Lopez for pt to see Dr. Velázquez for chronic elbow pain. New pt appt made for Monday June 17th @ [facility]. Pt verbalizes understanding of location, time and place

## 2019-05-29 NOTE — PROGRESS NOTES
"Subjective:       Patient ID: Braulio Kim Jr is a 54 y.o. male.    Chief Complaint: Follow-up (BW review); Dental Problem; and Back Pain    Follow up    Dental pain  + fever  Pain with chewing  Worsening    Review of Systems   Constitutional: Negative for activity change, appetite change, fatigue and fever.   HENT: Positive for facial swelling. Negative for congestion, dental problem, ear discharge, hearing loss, postnasal drip, sinus pain, sneezing and trouble swallowing.    Eyes: Negative for photophobia and discharge.   Respiratory: Negative for apnea, cough and shortness of breath.    Cardiovascular: Negative for chest pain.   Gastrointestinal: Negative for abdominal distention, abdominal pain, blood in stool, diarrhea, nausea and vomiting.   Endocrine: Negative for cold intolerance.   Genitourinary: Negative for difficulty urinating, flank pain, frequency, hematuria and testicular pain.   Musculoskeletal: Negative for arthralgias and myalgias.   Skin: Negative for color change.   Allergic/Immunologic: Negative for environmental allergies, food allergies and immunocompromised state.   Neurological: Negative for dizziness, syncope, numbness and headaches.   Hematological: Negative for adenopathy. Does not bruise/bleed easily.   Psychiatric/Behavioral: Negative for agitation, confusion, decreased concentration, hallucinations, self-injury and sleep disturbance.   All other systems reviewed and are negative.        Reviewed family, medical, surgical, and social history.    Objective:      /83 (BP Location: Right arm, Patient Position: Sitting, BP Method: Medium (Automatic))   Pulse 83   Resp 20   Ht 5' 7" (1.702 m)   Wt 98.9 kg (218 lb)   SpO2 96%   BMI 34.14 kg/m²   Physical Exam   Constitutional: He is oriented to person, place, and time. He appears well-developed and well-nourished. No distress.   HENT:   Head: Normocephalic and atraumatic.   Nose: Nose normal.   Mouth/Throat: Oropharynx is clear " and moist. No oropharyngeal exudate.   Eyes: Pupils are equal, round, and reactive to light. Conjunctivae and EOM are normal.   Neck: Normal range of motion. No thyromegaly present.   Cardiovascular: Normal rate, regular rhythm, normal heart sounds and intact distal pulses. Exam reveals no gallop and no friction rub.   No murmur heard.  Pulmonary/Chest: Effort normal and breath sounds normal. No respiratory distress. He has no wheezes. He has no rales.   Abdominal: Soft. He exhibits no distension. There is no tenderness. There is no guarding.   Musculoskeletal: Normal range of motion. He exhibits tenderness and deformity. He exhibits no edema.   Neurological: He is alert and oriented to person, place, and time. He displays normal reflexes. No cranial nerve deficit or sensory deficit. He exhibits normal muscle tone. Coordination normal.   Skin: Skin is warm and dry. No rash noted. He is not diaphoretic. No erythema. No pallor.   Psychiatric: He has a normal mood and affect. His behavior is normal. Judgment and thought content normal.   Nursing note and vitals reviewed.      Assessment:       1. Chronic elbow pain, unspecified laterality    2. Abscessed tooth        Plan:       Chronic elbow pain, unspecified laterality  -     HYDROcodone-acetaminophen (NORCO) 5-325 mg per tablet; Take 1 tablet by mouth every 6 (six) hours as needed for Pain.  Dispense: 12 tablet; Refill: 0  -     Ambulatory referral to Orthopedics    Abscessed tooth  -     HYDROcodone-acetaminophen (NORCO) 5-325 mg per tablet; Take 1 tablet by mouth every 6 (six) hours as needed for Pain.  Dispense: 12 tablet; Refill: 0  -     clindamycin (CLEOCIN) 300 MG capsule; Take 1 capsule (300 mg total) by mouth every 6 (six) hours. for 10 days  Dispense: 40 capsule; Refill: 0            Risks, benefits, and side effects were discussed with the patient. All questions were answered to the fullest satisfaction of the patient, and pt verbalized understanding and  agreement to treatment plan. Pt was to call with any new or worsening symptoms, or present to the ER.

## 2019-06-06 DIAGNOSIS — Z12.11 COLON CANCER SCREENING: ICD-10-CM

## 2019-06-07 ENCOUNTER — HOSPITAL ENCOUNTER (EMERGENCY)
Facility: HOSPITAL | Age: 54
Discharge: HOME | End: 2019-06-07
Attending: EMERGENCY MEDICINE
Payer: MEDICARE

## 2019-06-07 VITALS
RESPIRATION RATE: 20 BRPM | OXYGEN SATURATION: 92 % | DIASTOLIC BLOOD PRESSURE: 76 MMHG | HEIGHT: 67 IN | WEIGHT: 218 LBS | HEART RATE: 67 BPM | BODY MASS INDEX: 34.21 KG/M2 | TEMPERATURE: 99 F | SYSTOLIC BLOOD PRESSURE: 118 MMHG

## 2019-06-07 DIAGNOSIS — R07.81 RIB PAIN: ICD-10-CM

## 2019-06-07 DIAGNOSIS — J44.1 COPD WITH ACUTE EXACERBATION: Primary | ICD-10-CM

## 2019-06-07 DIAGNOSIS — R07.9 CHEST PAIN: ICD-10-CM

## 2019-06-07 LAB
ALBUMIN SERPL BCP-MCNC: 3.9 G/DL (ref 3.5–5.2)
ALP SERPL-CCNC: 58 U/L (ref 55–135)
ALT SERPL W/O P-5'-P-CCNC: 15 U/L (ref 10–44)
ANION GAP SERPL CALC-SCNC: 8 MMOL/L (ref 8–16)
AST SERPL-CCNC: 20 U/L (ref 10–40)
BASOPHILS # BLD AUTO: 0.04 K/UL (ref 0–0.2)
BASOPHILS NFR BLD: 0.4 % (ref 0–1.9)
BILIRUB SERPL-MCNC: 0.5 MG/DL (ref 0.1–1)
BNP SERPL-MCNC: 60 PG/ML (ref 0–99)
BUN SERPL-MCNC: 13 MG/DL (ref 6–20)
CALCIUM SERPL-MCNC: 9.3 MG/DL (ref 8.7–10.5)
CHLORIDE SERPL-SCNC: 105 MMOL/L (ref 95–110)
CO2 SERPL-SCNC: 27 MMOL/L (ref 23–29)
CREAT SERPL-MCNC: 1.1 MG/DL (ref 0.5–1.4)
DIFFERENTIAL METHOD: ABNORMAL
EOSINOPHIL # BLD AUTO: 0.3 K/UL (ref 0–0.5)
EOSINOPHIL NFR BLD: 3 % (ref 0–8)
ERYTHROCYTE [DISTWIDTH] IN BLOOD BY AUTOMATED COUNT: 13.4 % (ref 11.5–14.5)
EST. GFR  (AFRICAN AMERICAN): >60 ML/MIN/1.73 M^2
EST. GFR  (NON AFRICAN AMERICAN): >60 ML/MIN/1.73 M^2
GLUCOSE SERPL-MCNC: 114 MG/DL (ref 70–110)
HCT VFR BLD AUTO: 51.1 % (ref 40–54)
HGB BLD-MCNC: 17 G/DL (ref 14–18)
IMM GRANULOCYTES # BLD AUTO: 0.03 K/UL (ref 0–0.04)
IMM GRANULOCYTES NFR BLD AUTO: 0.3 % (ref 0–0.5)
LYMPHOCYTES # BLD AUTO: 2.7 K/UL (ref 1–4.8)
LYMPHOCYTES NFR BLD: 28.8 % (ref 18–48)
MAGNESIUM SERPL-MCNC: 2.1 MG/DL (ref 1.6–2.6)
MCH RBC QN AUTO: 32.1 PG (ref 27–31)
MCHC RBC AUTO-ENTMCNC: 33.3 G/DL (ref 32–36)
MCV RBC AUTO: 96 FL (ref 82–98)
MONOCYTES # BLD AUTO: 0.6 K/UL (ref 0.3–1)
MONOCYTES NFR BLD: 6.8 % (ref 4–15)
NEUTROPHILS # BLD AUTO: 5.7 K/UL (ref 1.8–7.7)
NEUTROPHILS NFR BLD: 60.7 % (ref 38–73)
NRBC BLD-RTO: 0 /100 WBC
PLATELET # BLD AUTO: 233 K/UL (ref 150–350)
PMV BLD AUTO: 10.6 FL (ref 9.2–12.9)
POTASSIUM SERPL-SCNC: 3.8 MMOL/L (ref 3.5–5.1)
PROT SERPL-MCNC: 6.7 G/DL (ref 6–8.4)
RBC # BLD AUTO: 5.3 M/UL (ref 4.6–6.2)
SODIUM SERPL-SCNC: 140 MMOL/L (ref 136–145)
TROPONIN I SERPL DL<=0.01 NG/ML-MCNC: 0.01 NG/ML (ref 0.02–0.5)
WBC # BLD AUTO: 9.39 K/UL (ref 3.9–12.7)

## 2019-06-07 PROCEDURE — 96374 THER/PROPH/DIAG INJ IV PUSH: CPT

## 2019-06-07 PROCEDURE — 71045 XR CHEST AP PORTABLE: ICD-10-PCS | Mod: 26,,, | Performed by: RADIOLOGY

## 2019-06-07 PROCEDURE — 63600175 PHARM REV CODE 636 W HCPCS: Performed by: EMERGENCY MEDICINE

## 2019-06-07 PROCEDURE — 99285 EMERGENCY DEPT VISIT HI MDM: CPT | Mod: 25

## 2019-06-07 PROCEDURE — 94761 N-INVAS EAR/PLS OXIMETRY MLT: CPT

## 2019-06-07 PROCEDURE — 94640 AIRWAY INHALATION TREATMENT: CPT

## 2019-06-07 PROCEDURE — 84484 ASSAY OF TROPONIN QUANT: CPT

## 2019-06-07 PROCEDURE — 71045 X-RAY EXAM CHEST 1 VIEW: CPT | Mod: TC,FY

## 2019-06-07 PROCEDURE — 83880 ASSAY OF NATRIURETIC PEPTIDE: CPT

## 2019-06-07 PROCEDURE — 71045 X-RAY EXAM CHEST 1 VIEW: CPT | Mod: 26,,, | Performed by: RADIOLOGY

## 2019-06-07 PROCEDURE — 96375 TX/PRO/DX INJ NEW DRUG ADDON: CPT

## 2019-06-07 PROCEDURE — 83735 ASSAY OF MAGNESIUM: CPT

## 2019-06-07 PROCEDURE — 85025 COMPLETE CBC W/AUTO DIFF WBC: CPT

## 2019-06-07 PROCEDURE — 25000242 PHARM REV CODE 250 ALT 637 W/ HCPCS: Performed by: EMERGENCY MEDICINE

## 2019-06-07 PROCEDURE — 93005 ELECTROCARDIOGRAM TRACING: CPT

## 2019-06-07 PROCEDURE — 93010 EKG 12-LEAD: ICD-10-PCS | Mod: ,,, | Performed by: INTERNAL MEDICINE

## 2019-06-07 PROCEDURE — 80053 COMPREHEN METABOLIC PANEL: CPT

## 2019-06-07 PROCEDURE — 93010 ELECTROCARDIOGRAM REPORT: CPT | Mod: ,,, | Performed by: INTERNAL MEDICINE

## 2019-06-07 PROCEDURE — 25000003 PHARM REV CODE 250: Performed by: EMERGENCY MEDICINE

## 2019-06-07 RX ORDER — ONDANSETRON 2 MG/ML
4 INJECTION INTRAMUSCULAR; INTRAVENOUS
Status: COMPLETED | OUTPATIENT
Start: 2019-06-07 | End: 2019-06-07

## 2019-06-07 RX ORDER — ASPIRIN 325 MG
325 TABLET ORAL
Status: COMPLETED | OUTPATIENT
Start: 2019-06-07 | End: 2019-06-07

## 2019-06-07 RX ORDER — ALBUTEROL SULFATE 90 UG/1
1-2 AEROSOL, METERED RESPIRATORY (INHALATION) EVERY 6 HOURS PRN
Qty: 1 INHALER | Refills: 0 | Status: SHIPPED | OUTPATIENT
Start: 2019-06-07 | End: 2020-06-06

## 2019-06-07 RX ORDER — IPRATROPIUM BROMIDE AND ALBUTEROL SULFATE 2.5; .5 MG/3ML; MG/3ML
3 SOLUTION RESPIRATORY (INHALATION)
Status: COMPLETED | OUTPATIENT
Start: 2019-06-07 | End: 2019-06-07

## 2019-06-07 RX ORDER — AZITHROMYCIN 250 MG/1
250 TABLET, FILM COATED ORAL DAILY
Qty: 6 TABLET | Refills: 0 | Status: SHIPPED | OUTPATIENT
Start: 2019-06-07 | End: 2019-06-17

## 2019-06-07 RX ORDER — MORPHINE SULFATE 4 MG/ML
4 INJECTION, SOLUTION INTRAMUSCULAR; INTRAVENOUS
Status: COMPLETED | OUTPATIENT
Start: 2019-06-07 | End: 2019-06-07

## 2019-06-07 RX ORDER — DEXAMETHASONE SODIUM PHOSPHATE 100 MG/10ML
10 INJECTION INTRAMUSCULAR; INTRAVENOUS
Status: COMPLETED | OUTPATIENT
Start: 2019-06-07 | End: 2019-06-07

## 2019-06-07 RX ORDER — OXYCODONE AND ACETAMINOPHEN 5; 325 MG/1; MG/1
1 TABLET ORAL EVERY 4 HOURS PRN
Qty: 18 TABLET | Refills: 0 | Status: SHIPPED | OUTPATIENT
Start: 2019-06-07 | End: 2019-06-28

## 2019-06-07 RX ORDER — PREDNISONE 10 MG/1
TABLET ORAL
Qty: 42 TABLET | Refills: 0 | Status: SHIPPED | OUTPATIENT
Start: 2019-06-07 | End: 2019-06-17

## 2019-06-07 RX ORDER — IPRATROPIUM BROMIDE AND ALBUTEROL SULFATE 2.5; .5 MG/3ML; MG/3ML
3 SOLUTION RESPIRATORY (INHALATION) EVERY 6 HOURS PRN
Qty: 1 BOX | Refills: 0 | Status: SHIPPED | OUTPATIENT
Start: 2019-06-07 | End: 2020-06-06

## 2019-06-07 RX ADMIN — ASPIRIN 325 MG ORAL TABLET 325 MG: 325 PILL ORAL at 06:06

## 2019-06-07 RX ADMIN — IPRATROPIUM BROMIDE AND ALBUTEROL SULFATE 3 ML: .5; 3 SOLUTION RESPIRATORY (INHALATION) at 08:06

## 2019-06-07 RX ADMIN — DEXAMETHASONE SODIUM PHOSPHATE 10 MG: 10 INJECTION INTRAMUSCULAR; INTRAVENOUS at 06:06

## 2019-06-07 RX ADMIN — IPRATROPIUM BROMIDE AND ALBUTEROL SULFATE 3 ML: .5; 3 SOLUTION RESPIRATORY (INHALATION) at 06:06

## 2019-06-07 RX ADMIN — MORPHINE SULFATE 4 MG: 4 INJECTION, SOLUTION INTRAMUSCULAR; INTRAVENOUS at 06:06

## 2019-06-07 RX ADMIN — ONDANSETRON 4 MG: 2 INJECTION INTRAMUSCULAR; INTRAVENOUS at 06:06

## 2019-06-07 NOTE — ED PROVIDER NOTES
Encounter Date: 6/7/2019       History     Chief Complaint   Patient presents with    Chest Pain    Cough     x2days     55yo male with pmh chronic back pain, cirrhosis/Hep C, PTSD, Schizophrenia presents to ED for evaluation of intermittent, aching, right sided chest pain that began after coughing forcefully for the last two days. Pain is exacerbated by deep breathing. Denies palpitations, dyspnea. Denies fever, chills.          Review of patient's allergies indicates:   Allergen Reactions    Motrin [ibuprofen]     Penicillins      Past Medical History:   Diagnosis Date    Cardiac abnormality     Chronic back pain     Cirrhosis     Depression     Hepatitis C     PTSD (post-traumatic stress disorder)     Schizoaffective disorder, bipolar type     Schizophrenia      Past Surgical History:   Procedure Laterality Date    ENDOSCOPIC RELEASE OF BOTH CARPAL TUNNELS      TESTICLE SURGERY       Family History   Problem Relation Age of Onset    Lupus Sister      Social History     Tobacco Use    Smoking status: Current Every Day Smoker     Packs/day: 1.00     Types: Cigarettes   Substance Use Topics    Alcohol use: Yes     Frequency: Never     Comment: 3 NIGHTS AGO    Drug use: No     Review of Systems   Constitutional: Negative for appetite change, chills, diaphoresis, fatigue and fever.   HENT: Negative for congestion, ear pain, rhinorrhea, sinus pressure, sinus pain and sore throat.    Eyes: Negative for photophobia and visual disturbance.   Respiratory: Positive for cough and chest tightness.    Cardiovascular: Positive for chest pain. Negative for palpitations and leg swelling.   Gastrointestinal: Negative for abdominal pain, constipation, diarrhea, nausea and vomiting.   Endocrine: Negative for cold intolerance, heat intolerance, polydipsia, polyphagia and polyuria.   Genitourinary: Negative for decreased urine volume, difficulty urinating, dysuria, flank pain, frequency, hematuria and urgency.    Musculoskeletal: Positive for back pain. Negative for arthralgias, gait problem, joint swelling, myalgias, neck pain and neck stiffness.   Skin: Negative for color change, pallor, rash and wound.   Neurological: Negative for dizziness, syncope, weakness, light-headedness, numbness and headaches.   Hematological: Negative for adenopathy. Does not bruise/bleed easily.   Psychiatric/Behavioral: Negative for decreased concentration, dysphoric mood and sleep disturbance.   All other systems reviewed and are negative.      Physical Exam     Initial Vitals   BP Pulse Resp Temp SpO2   -- -- -- -- --      MAP       --         Physical Exam    Nursing note and vitals reviewed.  Constitutional: He appears well-developed and well-nourished. He is not diaphoretic. No distress.   uncomfortable   HENT:   Head: Normocephalic and atraumatic.   Right Ear: External ear normal.   Left Ear: External ear normal.   Nose: Nose normal.   Eyes: Conjunctivae are normal. Pupils are equal, round, and reactive to light. No scleral icterus.   Neck: Normal range of motion. Neck supple. No JVD present.   Cardiovascular: Normal rate, regular rhythm, normal heart sounds and intact distal pulses.   Pulmonary/Chest: Breath sounds normal. No respiratory distress. He has no wheezes. He has no rhonchi. He has no rales. He exhibits no tenderness.   Abdominal: Soft. Bowel sounds are normal. He exhibits no distension. There is no tenderness. There is no rebound and no guarding.   Musculoskeletal: Normal range of motion. He exhibits no edema or tenderness.   Lymphadenopathy:     He has no cervical adenopathy.   Neurological: He is alert and oriented to person, place, and time. GCS score is 15. GCS eye subscore is 4. GCS verbal subscore is 5. GCS motor subscore is 6.   Skin: Skin is warm and dry. Capillary refill takes less than 2 seconds. No rash noted. No erythema.   Psychiatric: He has a normal mood and affect. His behavior is normal. Judgment and thought  content normal.         ED Course   Procedures  Labs Reviewed   CBC W/ AUTO DIFFERENTIAL   COMPREHENSIVE METABOLIC PANEL   TROPONIN I   B-TYPE NATRIURETIC PEPTIDE   MAGNESIUM     Results for orders placed or performed during the hospital encounter of 06/07/19   CBC auto differential   Result Value Ref Range    WBC 9.39 3.90 - 12.70 K/uL    RBC 5.30 4.60 - 6.20 M/uL    Hemoglobin 17.0 14.0 - 18.0 g/dL    Hematocrit 51.1 40.0 - 54.0 %    Mean Corpuscular Volume 96 82 - 98 fL    Mean Corpuscular Hemoglobin 32.1 (H) 27.0 - 31.0 pg    Mean Corpuscular Hemoglobin Conc 33.3 32.0 - 36.0 g/dL    RDW 13.4 11.5 - 14.5 %    Platelets 233 150 - 350 K/uL    MPV 10.6 9.2 - 12.9 fL    Immature Granulocytes 0.3 0.0 - 0.5 %    Gran # (ANC) 5.7 1.8 - 7.7 K/uL    Immature Grans (Abs) 0.03 0.00 - 0.04 K/uL    Lymph # 2.7 1.0 - 4.8 K/uL    Mono # 0.6 0.3 - 1.0 K/uL    Eos # 0.3 0.0 - 0.5 K/uL    Baso # 0.04 0.00 - 0.20 K/uL    nRBC 0 0 /100 WBC    Gran% 60.7 38.0 - 73.0 %    Lymph% 28.8 18.0 - 48.0 %    Mono% 6.8 4.0 - 15.0 %    Eosinophil% 3.0 0.0 - 8.0 %    Basophil% 0.4 0.0 - 1.9 %    Differential Method Automated    Comprehensive metabolic panel   Result Value Ref Range    Sodium 140 136 - 145 mmol/L    Potassium 3.8 3.5 - 5.1 mmol/L    Chloride 105 95 - 110 mmol/L    CO2 27 23 - 29 mmol/L    Glucose 114 (H) 70 - 110 mg/dL    BUN, Bld 13 6 - 20 mg/dL    Creatinine 1.1 0.5 - 1.4 mg/dL    Calcium 9.3 8.7 - 10.5 mg/dL    Total Protein 6.7 6.0 - 8.4 g/dL    Albumin 3.9 3.5 - 5.2 g/dL    Total Bilirubin 0.5 0.1 - 1.0 mg/dL    Alkaline Phosphatase 58 55 - 135 U/L    AST 20 10 - 40 U/L    ALT 15 10 - 44 U/L    Anion Gap 8 8 - 16 mmol/L    eGFR if African American >60.0 >60 mL/min/1.73 m^2    eGFR if non African American >60.0 >60 mL/min/1.73 m^2   Troponin I #1   Result Value Ref Range    Troponin I 0.01 (L) 0.02 - 0.50 ng/mL   B-Type natriuretic peptide (BNP)   Result Value Ref Range    BNP 60 0 - 99 pg/mL   Magnesium   Result Value Ref  Range    Magnesium 2.1 1.6 - 2.6 mg/dL       EKG Readings: (Independently Interpreted)   Initial Reading: No STEMI. Rhythm: Sinus Bradycardia. Heart Rate: 59. Ectopy: No Ectopy. Conduction: 1st Degree AV Block. ST Segments: Normal ST Segments. T Waves: Normal. Axis: Normal. Clinical Impression: Sinus Bradycardia       Imaging Results          X-Ray Chest AP Portable (In process)               Imaging Results          X-Ray Chest AP Portable (Final result)  Result time 06/08/19 10:10:19    Final result by Jo Ann Freire MD (06/08/19 10:10:19)                 Impression:      No acute cardiopulmonary disease      Electronically signed by: Jo Ann Freire MD  Date:    06/08/2019  Time:    10:10             Narrative:    EXAMINATION:  XR CHEST AP PORTABLE    CLINICAL HISTORY:  Chest Pain;    TECHNIQUE:  Single frontal view of the chest was performed.    COMPARISON:  10/20/2016    FINDINGS:  The heart is not enlarged the lungs are clear.  Costophrenic sulci are sharp.  Surgical clip or more likely artifact projects left suprahilar                                   Medical Decision Making:   Clinical Tests:   Lab Tests: Reviewed and Ordered  Radiological Study: Ordered and Reviewed  ED Management:      Stable to DC as per AVS    Bronchospasm with acute rib injury and potential pneumonia of right base of lung       Prednisone taper - steroid anti-inflammatory  Albuterol - bronchodilator  Zithromax - antibio                      Clinical Impression:       ICD-10-CM ICD-9-CM   1. COPD with acute exacerbation J44.1 491.21   2. Chest pain R07.9 786.50   3. Rib pain R07.81 786.50         Disposition:   Disposition: Discharged  Condition: Stable                        Agus Green MD  06/14/19 1121

## 2019-06-08 NOTE — DISCHARGE INSTRUCTIONS
Bronchospasm with acute rib injury and potential pneumonia of right base of lung       Prednisone taper - steroid anti-inflammatory  Albuterol - bronchodilator  Zithromax - antibiotic    Follow-up with Dr. Lopez    Deep inspiration every 10-15 minutes

## 2019-06-10 ENCOUNTER — TELEPHONE (OUTPATIENT)
Dept: FAMILY MEDICINE | Facility: CLINIC | Age: 54
End: 2019-06-10

## 2019-06-10 DIAGNOSIS — J44.9 CHRONIC OBSTRUCTIVE PULMONARY DISEASE, UNSPECIFIED COPD TYPE: Primary | ICD-10-CM

## 2019-06-10 NOTE — TELEPHONE ENCOUNTER
Pt went to ER Friday for chest pain and SOB, and was diagnosed with COPD.   Pt requesting orders for nebulizer for home use.   Please advise, thank you.         ----- Message from Mary Birmingham sent at 6/10/2019  8:56 AM CDT -----  Contact: pt  Pt states that he was in the ER and diagnosed with COPD and they wanted him to follow up with the next day also needing a prescription for a nebulizer machine.So pt is wanting to get in to see the Dr in the next 2 days too,please..[phone] (home)           .  [facility] [address] [facility], [place] - [address]  [address]  [place] [address]  Phone: [phone] Fax: [phone]

## 2019-06-11 ENCOUNTER — TELEPHONE (OUTPATIENT)
Dept: FAMILY MEDICINE | Facility: CLINIC | Age: 54
End: 2019-06-11

## 2019-06-11 NOTE — TELEPHONE ENCOUNTER
I have returned patient's call and advised that orders have been placed and faxed to [facility].  Kimberlee

## 2019-06-11 NOTE — TELEPHONE ENCOUNTER
----- Message from Fox Ordonez sent at 6/11/2019 11:20 AM CDT -----  Contact: Patient  Type: Needs Medical Advice    Who Called:  Patient  Best Call Back Number: [phone]  Additional Information: Patient would like to discuss breathing machine (COPD?) and nebulizer. Please call to advise. Thanks!

## 2019-06-13 DIAGNOSIS — M25.521 BILATERAL ELBOW JOINT PAIN: Primary | ICD-10-CM

## 2019-06-13 DIAGNOSIS — M25.522 BILATERAL ELBOW JOINT PAIN: Primary | ICD-10-CM

## 2019-06-17 ENCOUNTER — OFFICE VISIT (OUTPATIENT)
Dept: ORTHOPEDICS | Facility: CLINIC | Age: 54
End: 2019-06-17
Payer: MEDICARE

## 2019-06-17 ENCOUNTER — HOSPITAL ENCOUNTER (OUTPATIENT)
Dept: RADIOLOGY | Facility: HOSPITAL | Age: 54
Discharge: HOME | End: 2019-06-17
Attending: ORTHOPAEDIC SURGERY
Payer: MEDICARE

## 2019-06-17 VITALS
WEIGHT: 215 LBS | BODY MASS INDEX: 33.74 KG/M2 | HEART RATE: 82 BPM | RESPIRATION RATE: 16 BRPM | SYSTOLIC BLOOD PRESSURE: 141 MMHG | DIASTOLIC BLOOD PRESSURE: 89 MMHG | OXYGEN SATURATION: 98 % | HEIGHT: 67 IN

## 2019-06-17 DIAGNOSIS — M25.521 BILATERAL ELBOW JOINT PAIN: ICD-10-CM

## 2019-06-17 DIAGNOSIS — M25.522 BILATERAL ELBOW JOINT PAIN: ICD-10-CM

## 2019-06-17 DIAGNOSIS — M77.02 MEDIAL EPICONDYLITIS OF BOTH ELBOWS: ICD-10-CM

## 2019-06-17 DIAGNOSIS — M77.01 MEDIAL EPICONDYLITIS OF BOTH ELBOWS: ICD-10-CM

## 2019-06-17 PROCEDURE — 99999 PR PBB SHADOW E&M-EST. PATIENT-LVL III: ICD-10-PCS | Mod: PBBFAC,,, | Performed by: ORTHOPAEDIC SURGERY

## 2019-06-17 PROCEDURE — 99203 PR OFFICE/OUTPT VISIT, NEW, LEVL III, 30-44 MIN: ICD-10-PCS | Mod: S$GLB,,, | Performed by: ORTHOPAEDIC SURGERY

## 2019-06-17 PROCEDURE — 73070 X-RAY EXAM OF ELBOW: CPT | Mod: 26,50,, | Performed by: RADIOLOGY

## 2019-06-17 PROCEDURE — 99999 PR PBB SHADOW E&M-EST. PATIENT-LVL III: CPT | Mod: PBBFAC,,, | Performed by: ORTHOPAEDIC SURGERY

## 2019-06-17 PROCEDURE — 73070 X-RAY EXAM OF ELBOW: CPT | Mod: TC,50,FY

## 2019-06-17 PROCEDURE — 3008F BODY MASS INDEX DOCD: CPT | Mod: CPTII,S$GLB,, | Performed by: ORTHOPAEDIC SURGERY

## 2019-06-17 PROCEDURE — 99203 OFFICE O/P NEW LOW 30 MIN: CPT | Mod: S$GLB,,, | Performed by: ORTHOPAEDIC SURGERY

## 2019-06-17 PROCEDURE — 73070 XR ELBOW 2 VIEW BILATERAL: ICD-10-PCS | Mod: 26,50,, | Performed by: RADIOLOGY

## 2019-06-17 PROCEDURE — 3008F PR BODY MASS INDEX (BMI) DOCUMENTED: ICD-10-PCS | Mod: CPTII,S$GLB,, | Performed by: ORTHOPAEDIC SURGERY

## 2019-06-17 RX ORDER — DICLOFENAC SODIUM 10 MG/G
2 GEL TOPICAL 2 TIMES DAILY
COMMUNITY
End: 2019-06-18

## 2019-06-17 NOTE — LETTER
June 18, 2019      Isidoro Lopez MD  [address]  [place] [address]           [facility]  [address] [place] [address]  Phone: [phone]  Fax: [phone]          Patient: Brauloi Kim Jr   MR Number: [id number]   YOB: 1965   Date of Visit: 6/17/2019       Dear Dr. Isidoro Lopez:    Thank you for referring Braulio Kim Jr to me for evaluation. Attached you will find relevant portions of my assessment and plan of care.    If you have questions, please do not hesitate to call me. I look forward to following Braulio Kim Jr along with you.    Sincerely,    Jens Velázquez, DO    Enclosure  CC:  No Recipients    If you would like to receive this communication electronically, please contact [email or web address] or [phone] to request more information on [employer] access.    For providers and/or their staff who would like to refer a patient to [facility], please contact us through our one-stop-shop provider referral line, [facility], at [phone].    If you feel you have received this communication in error or would no longer like to receive these types of communications, please e-mail [email or web address]

## 2019-06-18 ENCOUNTER — HOSPITAL ENCOUNTER (EMERGENCY)
Facility: HOSPITAL | Age: 54
Discharge: HOME | End: 2019-06-19
Attending: EMERGENCY MEDICINE
Payer: MEDICARE

## 2019-06-18 DIAGNOSIS — J44.9 CHRONIC OBSTRUCTIVE PULMONARY DISEASE, UNSPECIFIED COPD TYPE: Primary | ICD-10-CM

## 2019-06-18 PROBLEM — M77.02 MEDIAL EPICONDYLITIS OF BOTH ELBOWS: Status: ACTIVE | Noted: 2019-06-18

## 2019-06-18 PROBLEM — M77.01 MEDIAL EPICONDYLITIS OF BOTH ELBOWS: Status: ACTIVE | Noted: 2019-06-18

## 2019-06-18 PROCEDURE — 99283 EMERGENCY DEPT VISIT LOW MDM: CPT

## 2019-06-18 NOTE — PROGRESS NOTES
Subjective:      Patient ID: Braulio Kim Jr is a 54 y.o. male.    Chief Complaint: Joint Swelling (bilateral elbows) and Joint Swelling (bilateral knee)    Referring Provider: Isidoro Lopez Md  [address] [place] [address]    HPI:  Mr. Kim is a 54-year-old left hand dominant male who presented today for evaluation of 5 years of bilateral elbow pain of insidious onset.  The patient denied trauma to his elbows.  His symptoms have been worsening over the last 2-3 years.  Flexing extending his elbow increases his symptoms while laying his arm flat decreases them. He cannot take NSAIDs due to liver issues.  His right elbow hurts more than his left.  He has not done physical therapy, worn a brace, nor had injections.    Past Medical History:   Diagnosis Date    Cardiac abnormality     Chronic back pain     Cirrhosis     Depression     Hepatitis C     PTSD (post-traumatic stress disorder)     Schizoaffective disorder, bipolar type      *  *  *  *  *  *  * Schizophrenia  Hyperlipidemia  COPD  Anxiety  MRI  GERD  Migraines  History of pain manage      Past Surgical History:   Procedure Laterality Date    ENDOSCOPIC RELEASE OF BOTH CARPAL TUNNELS       * TESTICLE SURGERY undescended testicle surgery  EGD         Review of patient's allergies indicates:   Allergen Reactions    Motrin [ibuprofen]     Penicillins        Social History     Occupational History    Disabled technician     Tobacco Use    Smoking status: Current Every Day Smoker     Packs/day: 1.00     Types: Cigarettes   Substance and Sexual Activity    Alcohol use: Yes     Frequency: Never     Comment: 3 NIGHTS AGO    Drug use: Marijuana    Sexual activity: Yes      Family History   Problem Relation Age of Onset    Lupus Sister        Previous Hospitalizations:  MI, hepatitis-C.       ROS:   Review of Systems   Constitution: Negative for chills and fever.   HENT: Negative for congestion.    Eyes: Negative for blurred  vision.   Cardiovascular: Negative for chest pain.   Respiratory: Negative for cough.    Endocrine: Negative for polydipsia.   Hematologic/Lymphatic: Negative for adenopathy.   Skin: Negative for flushing, rash and skin cancer.   Musculoskeletal: Positive for back pain, joint pain and joint swelling. Negative for gout.   Gastrointestinal: Positive for heartburn. Negative for constipation and diarrhea.   Genitourinary: Negative for nocturia.   Neurological: Positive for headaches. Negative for seizures.   Psychiatric/Behavioral: Positive for depression and substance abuse. The patient is nervous/anxious.    Allergic/Immunologic: Negative for environmental allergies.           Objective:      Physical Exam:   General:  Very unique, unkempt, AAOx3.  No acute distress  HEENT: Normocephalic, PEARLA EOMI, poor dentition with multiple missing teeth  Neck: Supple, No JVD  Chest: Symetric, equal excursion on inspiration  Abdomen: Soft NTND  Vascular:  Pulses intact and equal bilaterally.  Capillary refill less than 3 seconds and equal bilaterally  Neurologic:  Pinprick and soft touch intact and equal bilaterally  Integment:  No ecchymosis, no errythema  Extremity:  Elbow:  Pronation/supination equal bilaterally 90/80 degrees. Extension/flexion equal bilaterally 0/125 degrees. Mild tenderness with motion both elbows right greater than left.  Radius/ulna stressing equal bilaterally with good endpoint.  Nontender over the olecranon.  Nontender at the triceps insertion.  Triceps palpable throughout full distribution bilaterally. Nontender over the lateral epicondyle both elbows.  Tender over the medial epicondyle both elbows.  Mild swelling at the medial epicondyle both elbows.  Tender with resisted flexion both wrists.  Radiography:  Personally reviewed x-rays of both elbows completed on 06/17/2019 showed no fracture dislocation, mild arthritic changes.      Assessment:       Impression:      1. Medial epicondylitis of both  elbows          Plan:       1.  Discussed physical examination and radiographic findings with the patient. Braulio understands that he has chronic lateral epicondylitis of his elbows.  And typically the treatment is conservative management but if he fails conservative care then he could consider surgical intervention.  2.  Voltaren 1% gel, apply to affected area twice daily and massage in for 2 min.  Dispense 100 g, refill 5.  3.  Home exercises to include forearm extension/flexion exercises were shown discussed.  4.  It was discussed in detail with the patient what motions cause medial epicondylitis and what he should avoid.  5.  Offered referred to physical therapy he declined for now.  6.  Normally would recommend NSAID use but the patient stated he cannot take them due to liver issues.  7.  Will hold off on steroid injection to see if he improves with current modalities.  8.  [facility] portal was discussed with the patient and information was given. The patient was encouraged to use the portal for future encounters.  9.  Follow up p.r.n..

## 2019-06-19 VITALS
HEART RATE: 76 BPM | TEMPERATURE: 98 F | DIASTOLIC BLOOD PRESSURE: 83 MMHG | HEIGHT: 67 IN | SYSTOLIC BLOOD PRESSURE: 120 MMHG | BODY MASS INDEX: 33.74 KG/M2 | WEIGHT: 215 LBS | RESPIRATION RATE: 18 BRPM

## 2019-06-19 RX ORDER — PREDNISONE 10 MG/1
30 TABLET ORAL DAILY
Qty: 9 TABLET | Refills: 0 | Status: SHIPPED | OUTPATIENT
Start: 2019-06-19 | End: 2019-06-19 | Stop reason: SDUPTHER

## 2019-06-19 RX ORDER — PREDNISONE 10 MG/1
30 TABLET ORAL DAILY
Qty: 9 TABLET | Refills: 0 | Status: SHIPPED | OUTPATIENT
Start: 2019-06-19 | End: 2019-06-22

## 2019-06-19 NOTE — DISCHARGE INSTRUCTIONS
Prednisone 30 mg daily x3 days    Schedule albuterol treatments every 4 hr      Narrative     EXAMINATION:  XR CHEST AP PORTABLE    CLINICAL HISTORY:  Chest Pain;    TECHNIQUE:  Single frontal view of the chest was performed.    COMPARISON:  10/20/2016    FINDINGS:  The heart is not enlarged the lungs are clear.  Costophrenic sulci are sharp.  Surgical clip or more likely artifact projects left suprahilar      Impression       No acute cardiopulmonary disease

## 2019-06-19 NOTE — ED NOTES
Pt complains of hypertension, headache, ear pain and throat pain onset this morning.  Pt awake and alert. Respirations non labored. Sitting up in chair in room.  Pt updated on plan of care. Waiting on MD evaluation. Delay explained. Voiced understanding. Denies needs.

## 2019-06-24 ENCOUNTER — PATIENT OUTREACH (OUTPATIENT)
Dept: ADMINISTRATIVE | Facility: HOSPITAL | Age: 54
End: 2019-06-24

## 2019-06-24 NOTE — LETTER
"July 5, 2019    Braulio Kim   [address] [place] [address]             [facility]  [address]  [facility] [address]  Phone: [phone] Dear Robert [facility] is committed to your overall health and would like to ensure that you are up to date on your recommended test and/or procedures.   Isidoro Lopez MD  has found that your chart shows you may be due for the following:     COLORECTAL CANCER SCREENING     If you haven't signed up for a colorectal screening please accept this invitation to be screened.       According to the American Cancer Society, colon cancer is the third most common cancer for people in the [place].  A simple screening test "Fit Kit" - done in your own home - can help find colon cancer at an e briana stage when it can be treated, even before any signs or symptoms develop. THIS IS A YEARLY TEST.     Testing for blood in your stool (feces or bowel movement) is the first step. If you have an upcoming visit with your Primary Care Physician you can  a Fit Kit during your visit or you can  a Fit Kit at your Primary Care Clinic prior to your visit.     The Fit Kit includes:     Instructions on how to perform the test   (1) Sheet of tissue paper   (1) Small Absorption pad   (1) Bottle to hold the sample and a small probe to help you take the sample   (1) Small plastic bio-hazard bag   (1) Postage-paid return envelope     Please do your test (the instructions will tell you how) and then return your sample in the postage-paid return envelope within 24 hours of collection.     If your test results are negative, you won't need testing again for another year.  If results show you need more testing, we will call you with next steps.     Regular colorectal cancer screening is one of the most powerful weapons for preventing colon cancer.  The website https://www.cancer.org/cancer/colon-rectal-cancer.html can answer many of your questions about " "this cancer and its prevention.  Just search for "colorectal cancer".     If you have any questions please call [facility] TOUCH [phone] for assistance.     If you have had any of the above done at another facility, please let us know so that we may obtain copies from that facility.  If you have a copy of these records, please provide a copy for us to scan into your chart.  You are welcome to request that the report be faxed to us at  ([phone]).     Otherwise, please schedule these appointments at your earliest convenience by calling [phone] or going to [email or web address].     If you have an upcoming scheduled appointment for the item above, please disregard this letter.     Sincerely,   Your [facility]   MD Danna Escobar L.P.N. Clinical Care Coordinator   [address] [place] [address], [place] [address] [phone]        "

## 2019-06-28 ENCOUNTER — HOSPITAL ENCOUNTER (EMERGENCY)
Facility: HOSPITAL | Age: 54
Discharge: HOME | End: 2019-06-29
Attending: FAMILY MEDICINE
Payer: MEDICARE

## 2019-06-28 VITALS
TEMPERATURE: 98 F | WEIGHT: 215 LBS | DIASTOLIC BLOOD PRESSURE: 100 MMHG | SYSTOLIC BLOOD PRESSURE: 147 MMHG | HEIGHT: 67 IN | BODY MASS INDEX: 33.74 KG/M2 | HEART RATE: 83 BPM | RESPIRATION RATE: 20 BRPM | OXYGEN SATURATION: 96 %

## 2019-06-28 DIAGNOSIS — H66.91 RIGHT OTITIS MEDIA, UNSPECIFIED OTITIS MEDIA TYPE: Primary | ICD-10-CM

## 2019-06-28 PROCEDURE — 99283 EMERGENCY DEPT VISIT LOW MDM: CPT

## 2019-06-28 RX ORDER — AZITHROMYCIN 250 MG/1
250 TABLET, FILM COATED ORAL DAILY
Qty: 6 TABLET | Refills: 0 | Status: SHIPPED | OUTPATIENT
Start: 2019-06-28 | End: 2019-07-24 | Stop reason: SDUPTHER

## 2019-06-29 NOTE — ED PROVIDER NOTES
"Encounter Date: 6/28/2019       History     Chief Complaint   Patient presents with    Otalgia     right ear pain-reports pressue " feels like something is in it"     54-year-old male presents complaining of pain to the right ear onset was 2 days ago he did get some water in his ear in the shower he denies any drainage        Review of patient's allergies indicates:   Allergen Reactions    Motrin [ibuprofen]     Penicillins      Past Medical History:   Diagnosis Date    Cardiac abnormality     Chronic back pain     Cirrhosis     Depression     Hepatitis C     PTSD (post-traumatic stress disorder)     Schizoaffective disorder, bipolar type     Schizophrenia      Past Surgical History:   Procedure Laterality Date    ENDOSCOPIC RELEASE OF BOTH CARPAL TUNNELS      TESTICLE SURGERY       Family History   Problem Relation Age of Onset    Lupus Sister      Social History     Tobacco Use    Smoking status: Current Every Day Smoker     Packs/day: 1.50     Types: Cigarettes   Substance Use Topics    Alcohol use: Yes     Frequency: Never     Comment: 3 NIGHTS AGO    Drug use: No     Review of Systems   Constitutional: Negative for fever.   HENT: Positive for ear pain. Negative for congestion, drooling, ear discharge and sore throat.    Respiratory: Negative for shortness of breath.    Cardiovascular: Negative for chest pain.   Gastrointestinal: Negative for nausea.   Genitourinary: Negative for dysuria.   Musculoskeletal: Negative for back pain.   Skin: Negative for rash.   Neurological: Negative for weakness.   Hematological: Does not bruise/bleed easily.       Physical Exam     Initial Vitals [06/28/19 2156]   BP Pulse Resp Temp SpO2   (!) 147/100 83 20 98 °F (36.7 °C) 96 %      MAP       --         Physical Exam    Nursing note and vitals reviewed.  Constitutional: He appears well-developed and well-nourished. He is not diaphoretic. No distress.   HENT:   Head: Normocephalic and atraumatic.   Right Ear: " External ear normal.   Left Ear: External ear normal.   Nose: Nose normal.   Mouth/Throat: Oropharynx is clear and moist. No oropharyngeal exudate.   Eyes: EOM are normal.   Neck: Normal range of motion. Neck supple. No tracheal deviation present.   Cardiovascular: Normal rate and regular rhythm.   No murmur heard.  Pulmonary/Chest: Breath sounds normal. No stridor. No respiratory distress. He has no rales.   Abdominal: Soft. He exhibits no distension and no mass. There is no tenderness. There is no rebound.   Musculoskeletal: Normal range of motion. He exhibits no edema.   Lymphadenopathy:     He has no cervical adenopathy.   Neurological: He is alert and oriented to person, place, and time. He has normal strength.   Skin: Skin is warm and dry. Capillary refill takes less than 2 seconds. No pallor.   Psychiatric: He has a normal mood and affect.         ED Course   Procedures  Labs Reviewed - No data to display       Imaging Results    None                               Clinical Impression:       ICD-10-CM ICD-9-CM   1. Right otitis media, unspecified otitis media type H66.91 382.9                                Nuno Romero MD  06/29/19 8692

## 2019-06-29 NOTE — ED NOTES
"Pt here with complaints of right ear discomfort x 3 days. Pt reports " feels like pressure, like something is in it ".  Pt reports he tried to use q-tip and piece of wire to get pressure to go away with no relief. Pt awake and alert. Respirations non labored. No acute distress.  "

## 2019-07-05 NOTE — PROGRESS NOTES
"Attempted to outreach patient for pre-visit via "[facility]", no answer after a week. Sending outreach via Mail Out Letter now.    "

## 2019-07-09 ENCOUNTER — TELEPHONE (OUTPATIENT)
Dept: FAMILY MEDICINE | Facility: CLINIC | Age: 54
End: 2019-07-09

## 2019-07-09 NOTE — TELEPHONE ENCOUNTER
Called pt x3 to confirm tomorrow's appt- no VM, unable to LVM.      ----- Message -----  From: Sami Florez  Sent: 7/8/2019   3:27 PM  To: [facility] Staff    Type: Needs Medical Advice    Who Called:  Patient  [phone]    Best Call Back Number: Patient  [phone]    Additional Information:     Advised returning a call to the office not sure to whom or what for. Please call.

## 2019-07-10 ENCOUNTER — OFFICE VISIT (OUTPATIENT)
Dept: FAMILY MEDICINE | Facility: CLINIC | Age: 54
End: 2019-07-10
Payer: MEDICARE

## 2019-07-10 ENCOUNTER — HOSPITAL ENCOUNTER (EMERGENCY)
Facility: HOSPITAL | Age: 54
Discharge: HOME | End: 2019-07-10
Attending: FAMILY MEDICINE
Payer: MEDICARE

## 2019-07-10 VITALS
WEIGHT: 211 LBS | TEMPERATURE: 98 F | OXYGEN SATURATION: 95 % | BODY MASS INDEX: 33.12 KG/M2 | RESPIRATION RATE: 16 BRPM | HEIGHT: 67 IN | DIASTOLIC BLOOD PRESSURE: 97 MMHG | SYSTOLIC BLOOD PRESSURE: 148 MMHG | HEART RATE: 68 BPM

## 2019-07-10 VITALS
HEART RATE: 84 BPM | SYSTOLIC BLOOD PRESSURE: 119 MMHG | WEIGHT: 211 LBS | DIASTOLIC BLOOD PRESSURE: 85 MMHG | OXYGEN SATURATION: 95 % | HEIGHT: 67 IN | BODY MASS INDEX: 33.12 KG/M2 | TEMPERATURE: 100 F | RESPIRATION RATE: 20 BRPM

## 2019-07-10 DIAGNOSIS — R07.9 CHEST PAIN: ICD-10-CM

## 2019-07-10 DIAGNOSIS — F31.9 BIPOLAR 1 DISORDER: ICD-10-CM

## 2019-07-10 DIAGNOSIS — J44.1 COPD EXACERBATION: Primary | ICD-10-CM

## 2019-07-10 DIAGNOSIS — M54.9 BACK PAIN, UNSPECIFIED BACK LOCATION, UNSPECIFIED BACK PAIN LATERALITY, UNSPECIFIED CHRONICITY: Primary | ICD-10-CM

## 2019-07-10 DIAGNOSIS — J32.9 SINUSITIS, UNSPECIFIED CHRONICITY, UNSPECIFIED LOCATION: ICD-10-CM

## 2019-07-10 DIAGNOSIS — J40 BRONCHITIS: ICD-10-CM

## 2019-07-10 LAB
ALBUMIN SERPL BCP-MCNC: 4.6 G/DL (ref 3.5–5.2)
ALP SERPL-CCNC: 55 U/L (ref 55–135)
ALT SERPL W/O P-5'-P-CCNC: 23 U/L (ref 10–44)
ANION GAP SERPL CALC-SCNC: 11 MMOL/L (ref 8–16)
AST SERPL-CCNC: 27 U/L (ref 10–40)
BASOPHILS # BLD AUTO: 0.02 K/UL (ref 0–0.2)
BASOPHILS NFR BLD: 0.2 % (ref 0–1.9)
BILIRUB SERPL-MCNC: 0.8 MG/DL (ref 0.1–1)
BUN SERPL-MCNC: 17 MG/DL (ref 6–20)
CALCIUM SERPL-MCNC: 9.5 MG/DL (ref 8.7–10.5)
CHLORIDE SERPL-SCNC: 102 MMOL/L (ref 95–110)
CO2 SERPL-SCNC: 23 MMOL/L (ref 23–29)
CREAT SERPL-MCNC: 1.2 MG/DL (ref 0.5–1.4)
DIFFERENTIAL METHOD: ABNORMAL
EOSINOPHIL # BLD AUTO: 0 K/UL (ref 0–0.5)
EOSINOPHIL NFR BLD: 0 % (ref 0–8)
ERYTHROCYTE [DISTWIDTH] IN BLOOD BY AUTOMATED COUNT: 13 % (ref 11.5–14.5)
EST. GFR  (AFRICAN AMERICAN): >60 ML/MIN/1.73 M^2
EST. GFR  (NON AFRICAN AMERICAN): >60 ML/MIN/1.73 M^2
GLUCOSE SERPL-MCNC: 169 MG/DL (ref 70–110)
HCT VFR BLD AUTO: 56.6 % (ref 40–54)
HGB BLD-MCNC: 19.2 G/DL (ref 14–18)
IMM GRANULOCYTES # BLD AUTO: 0.03 K/UL (ref 0–0.04)
IMM GRANULOCYTES NFR BLD AUTO: 0.4 % (ref 0–0.5)
LYMPHOCYTES # BLD AUTO: 0.9 K/UL (ref 1–4.8)
LYMPHOCYTES NFR BLD: 10.7 % (ref 18–48)
MCH RBC QN AUTO: 32.3 PG (ref 27–31)
MCHC RBC AUTO-ENTMCNC: 33.9 G/DL (ref 32–36)
MCV RBC AUTO: 95 FL (ref 82–98)
MONOCYTES # BLD AUTO: 0.1 K/UL (ref 0.3–1)
MONOCYTES NFR BLD: 0.6 % (ref 4–15)
NEUTROPHILS # BLD AUTO: 7.3 K/UL (ref 1.8–7.7)
NEUTROPHILS NFR BLD: 88.1 % (ref 38–73)
NRBC BLD-RTO: 0 /100 WBC
PLATELET # BLD AUTO: 238 K/UL (ref 150–350)
PMV BLD AUTO: 9.6 FL (ref 9.2–12.9)
POTASSIUM SERPL-SCNC: 4.2 MMOL/L (ref 3.5–5.1)
PROT SERPL-MCNC: 8.3 G/DL (ref 6–8.4)
RBC # BLD AUTO: 5.94 M/UL (ref 4.6–6.2)
SODIUM SERPL-SCNC: 136 MMOL/L (ref 136–145)
TROPONIN I SERPL DL<=0.01 NG/ML-MCNC: 0.01 NG/ML (ref 0.02–0.5)
WBC # BLD AUTO: 8.23 K/UL (ref 3.9–12.7)

## 2019-07-10 PROCEDURE — 96372 THER/PROPH/DIAG INJ SC/IM: CPT | Mod: S$GLB,,, | Performed by: FAMILY MEDICINE

## 2019-07-10 PROCEDURE — 63600175 PHARM REV CODE 636 W HCPCS: Performed by: FAMILY MEDICINE

## 2019-07-10 PROCEDURE — 99285 EMERGENCY DEPT VISIT HI MDM: CPT | Mod: 25

## 2019-07-10 PROCEDURE — 96372 PR INJECTION,THERAP/PROPH/DIAG2ST, IM OR SUBCUT: ICD-10-PCS | Mod: S$GLB,,, | Performed by: FAMILY MEDICINE

## 2019-07-10 PROCEDURE — 99214 PR OFFICE/OUTPT VISIT, EST, LEVL IV, 30-39 MIN: ICD-10-PCS | Mod: 25,S$GLB,, | Performed by: FAMILY MEDICINE

## 2019-07-10 PROCEDURE — 99214 OFFICE O/P EST MOD 30 MIN: CPT | Mod: 25,S$GLB,, | Performed by: FAMILY MEDICINE

## 2019-07-10 PROCEDURE — 84484 ASSAY OF TROPONIN QUANT: CPT

## 2019-07-10 PROCEDURE — 93005 ELECTROCARDIOGRAM TRACING: CPT

## 2019-07-10 PROCEDURE — 99999 PR PBB SHADOW E&M-EST. PATIENT-LVL III: CPT | Mod: PBBFAC,,, | Performed by: FAMILY MEDICINE

## 2019-07-10 PROCEDURE — 3008F PR BODY MASS INDEX (BMI) DOCUMENTED: ICD-10-PCS | Mod: CPTII,S$GLB,, | Performed by: FAMILY MEDICINE

## 2019-07-10 PROCEDURE — 99999 PR PBB SHADOW E&M-EST. PATIENT-LVL III: ICD-10-PCS | Mod: PBBFAC,,, | Performed by: FAMILY MEDICINE

## 2019-07-10 PROCEDURE — 25000003 PHARM REV CODE 250: Performed by: FAMILY MEDICINE

## 2019-07-10 PROCEDURE — 96374 THER/PROPH/DIAG INJ IV PUSH: CPT

## 2019-07-10 PROCEDURE — 71045 X-RAY EXAM CHEST 1 VIEW: CPT | Mod: 26,,, | Performed by: RADIOLOGY

## 2019-07-10 PROCEDURE — 80053 COMPREHEN METABOLIC PANEL: CPT

## 2019-07-10 PROCEDURE — 71045 XR CHEST AP PORTABLE: ICD-10-PCS | Mod: 26,,, | Performed by: RADIOLOGY

## 2019-07-10 PROCEDURE — 3008F BODY MASS INDEX DOCD: CPT | Mod: CPTII,S$GLB,, | Performed by: FAMILY MEDICINE

## 2019-07-10 PROCEDURE — 71045 X-RAY EXAM CHEST 1 VIEW: CPT | Mod: TC,FY

## 2019-07-10 PROCEDURE — 85025 COMPLETE CBC W/AUTO DIFF WBC: CPT

## 2019-07-10 RX ORDER — DEXAMETHASONE SODIUM PHOSPHATE 4 MG/ML
8 INJECTION, SOLUTION INTRA-ARTICULAR; INTRALESIONAL; INTRAMUSCULAR; INTRAVENOUS; SOFT TISSUE
Status: COMPLETED | OUTPATIENT
Start: 2019-07-10 | End: 2019-07-10

## 2019-07-10 RX ORDER — PREDNISONE 20 MG/1
20 TABLET ORAL 2 TIMES DAILY
Qty: 10 TABLET | Refills: 0 | Status: SHIPPED | OUTPATIENT
Start: 2019-07-10 | End: 2019-07-15

## 2019-07-10 RX ORDER — PREGABALIN 75 MG/1
75 CAPSULE ORAL 2 TIMES DAILY
COMMUNITY

## 2019-07-10 RX ORDER — PANTOPRAZOLE SODIUM 40 MG/1
40 TABLET, DELAYED RELEASE ORAL DAILY
COMMUNITY

## 2019-07-10 RX ORDER — AZITHROMYCIN 250 MG/1
250 TABLET, FILM COATED ORAL DAILY
Qty: 6 TABLET | Refills: 0 | Status: SHIPPED | OUTPATIENT
Start: 2019-07-10 | End: 2019-07-10 | Stop reason: SDUPTHER

## 2019-07-10 RX ORDER — MORPHINE SULFATE 4 MG/ML
4 INJECTION, SOLUTION INTRAMUSCULAR; INTRAVENOUS
Status: COMPLETED | OUTPATIENT
Start: 2019-07-10 | End: 2019-07-10

## 2019-07-10 RX ORDER — AZITHROMYCIN 250 MG/1
500 TABLET, FILM COATED ORAL
Status: COMPLETED | OUTPATIENT
Start: 2019-07-10 | End: 2019-07-10

## 2019-07-10 RX ORDER — SODIUM CHLORIDE 9 MG/ML
1000 INJECTION, SOLUTION INTRAVENOUS
Status: COMPLETED | OUTPATIENT
Start: 2019-07-10 | End: 2019-07-10

## 2019-07-10 RX ORDER — CLINDAMYCIN HYDROCHLORIDE 300 MG/1
300 CAPSULE ORAL EVERY 6 HOURS
COMMUNITY
End: 2019-08-13

## 2019-07-10 RX ORDER — AZITHROMYCIN 250 MG/1
250 TABLET, FILM COATED ORAL DAILY
Qty: 6 TABLET | Refills: 0 | Status: SHIPPED | OUTPATIENT
Start: 2019-07-10 | End: 2019-08-13

## 2019-07-10 RX ADMIN — SODIUM CHLORIDE 1000 ML: 9 INJECTION, SOLUTION INTRAVENOUS at 09:07

## 2019-07-10 RX ADMIN — DEXAMETHASONE SODIUM PHOSPHATE 8 MG: 4 INJECTION, SOLUTION INTRA-ARTICULAR; INTRALESIONAL; INTRAMUSCULAR; INTRAVENOUS; SOFT TISSUE at 03:07

## 2019-07-10 RX ADMIN — MORPHINE SULFATE 4 MG: 4 INJECTION, SOLUTION INTRAMUSCULAR; INTRAVENOUS at 10:07

## 2019-07-10 RX ADMIN — AZITHROMYCIN MONOHYDRATE 500 MG: 250 TABLET ORAL at 10:07

## 2019-07-10 NOTE — PROGRESS NOTES
"Subjective:       Patient ID: Braulio Kim Jr is a 54 y.o. male.    Chief Complaint: Follow-up (6wk)    In regards to the patient's hypertension, patient denies chest pain/sob, and has been compliant with the medication regimen.     Sinus pressure  Sinus pain  Ear fullness   Worsening      Review of Systems   Constitutional: Positive for fever. Negative for activity change, appetite change and fatigue.   HENT: Positive for congestion, ear pain, postnasal drip, rhinorrhea, sinus pressure, sinus pain, sneezing and sore throat. Negative for dental problem, ear discharge, hearing loss and trouble swallowing.    Eyes: Negative for photophobia and discharge.   Respiratory: Positive for shortness of breath and wheezing. Negative for apnea and cough.    Cardiovascular: Negative for chest pain.   Gastrointestinal: Negative for abdominal distention, abdominal pain, blood in stool, diarrhea, nausea and vomiting.   Endocrine: Negative for cold intolerance.   Genitourinary: Negative for difficulty urinating, flank pain, frequency, hematuria and testicular pain.   Musculoskeletal: Negative for arthralgias and myalgias.   Skin: Negative for color change.   Allergic/Immunologic: Negative for environmental allergies, food allergies and immunocompromised state.   Neurological: Negative for dizziness, syncope, numbness and headaches.   Hematological: Negative for adenopathy. Does not bruise/bleed easily.   Psychiatric/Behavioral: Negative for agitation, confusion, decreased concentration, hallucinations, self-injury and sleep disturbance.   All other systems reviewed and are negative.        Reviewed family, medical, surgical, and social history.    Objective:      BP (!) 148/97 (BP Location: Left arm, Patient Position: Sitting, BP Method: Medium (Automatic))   Pulse 68   Temp 98.2 °F (36.8 °C)   Resp 16   Ht 5' 7" (1.702 m)   Wt 95.7 kg (211 lb)   SpO2 95%   BMI 33.05 kg/m²   Physical Exam   Constitutional: He is oriented to " person, place, and time. He appears well-developed and well-nourished. No distress.   HENT:   Head: Normocephalic and atraumatic.   Nose: Rhinorrhea present. Right sinus exhibits maxillary sinus tenderness and frontal sinus tenderness. Left sinus exhibits maxillary sinus tenderness and frontal sinus tenderness.   Mouth/Throat: Posterior oropharyngeal edema and posterior oropharyngeal erythema present. No oropharyngeal exudate.   Eyes: Pupils are equal, round, and reactive to light. Conjunctivae and EOM are normal.   Neck: Normal range of motion. No thyromegaly present.   Cardiovascular: Normal rate, regular rhythm, normal heart sounds and intact distal pulses. Exam reveals no gallop and no friction rub.   No murmur heard.  Pulmonary/Chest: Effort normal. No respiratory distress. He has wheezes. He has no rales.   Abdominal: Soft. He exhibits no distension. There is no tenderness. There is no guarding.   Musculoskeletal: Normal range of motion. He exhibits no edema, tenderness or deformity.   Neurological: He is alert and oriented to person, place, and time. He displays normal reflexes. No cranial nerve deficit or sensory deficit. He exhibits normal muscle tone. Coordination normal.   Skin: Skin is warm and dry. No rash noted. He is not diaphoretic. No erythema. No pallor.   Psychiatric: He has a normal mood and affect. His behavior is normal. Judgment and thought content normal.   Nursing note and vitals reviewed.      Assessment:       1. Back pain, unspecified back location, unspecified back pain laterality, unspecified chronicity    2. Bipolar 1 disorder        Plan:       Back pain, unspecified back location, unspecified back pain laterality, unspecified chronicity  -     Ambulatory referral to Pain Clinic    Bipolar 1 disorder            Risks, benefits, and side effects were discussed with the patient. All questions were answered to the fullest satisfaction of the patient, and pt verbalized understanding and  agreement to treatment plan. Pt was to call with any new or worsening symptoms, or present to the ER.

## 2019-07-11 NOTE — ED PROVIDER NOTES
Encounter Date: 7/10/2019       History     Chief Complaint   Patient presents with    Chest Pain    Cough     54-year-old male presents per EMS complaining feeling short of breath having nasal congestion cough productive yellow phlegm sneezing and fatigue for the past 2 days patient was seen earlier by his medical doctor and given a shot of steroids he did give me antibiotics        Review of patient's allergies indicates:   Allergen Reactions    Beeswax     Motrin [ibuprofen]     Penicillins      Past Medical History:   Diagnosis Date    Cardiac abnormality     Chronic back pain     Cirrhosis     Depression     Hepatitis C     PTSD (post-traumatic stress disorder)     Schizoaffective disorder, bipolar type     Schizophrenia      Past Surgical History:   Procedure Laterality Date    ENDOSCOPIC RELEASE OF BOTH CARPAL TUNNELS      TESTICLE SURGERY       Family History   Problem Relation Age of Onset    Lupus Sister      Social History     Tobacco Use    Smoking status: Current Every Day Smoker     Packs/day: 1.50     Types: Cigarettes    Smokeless tobacco: Never Used   Substance Use Topics    Alcohol use: Yes     Frequency: Never     Comment: 3 NIGHTS AGO    Drug use: No     Review of Systems   Constitutional: Negative for fever.   HENT: Negative for sore throat.    Respiratory: Negative for shortness of breath.    Cardiovascular: Negative for chest pain.   Gastrointestinal: Negative for nausea.   Genitourinary: Negative for dysuria.   Musculoskeletal: Negative for back pain.   Skin: Negative for rash.   Neurological: Negative for weakness.   Hematological: Does not bruise/bleed easily.       Physical Exam     Initial Vitals [07/10/19 2129]   BP Pulse Resp Temp SpO2   119/85 84 20 99.5 °F (37.5 °C) 95 %      MAP       --         Physical Exam    Nursing note and vitals reviewed.  Constitutional: He appears well-developed and well-nourished. He is not diaphoretic. No distress.   HENT:   Head:  Normocephalic and atraumatic.   Right Ear: External ear normal.   Left Ear: External ear normal.   Nose: Nose normal.   Mouth/Throat: Oropharynx is clear and moist. No oropharyngeal exudate.   Eyes: EOM are normal.   Neck: Normal range of motion. Neck supple. No tracheal deviation present.   Cardiovascular: Normal rate and regular rhythm.   No murmur heard.  Pulmonary/Chest: Breath sounds normal. No stridor. No respiratory distress. He has no rales.   Abdominal: Soft. He exhibits no distension and no mass. There is no tenderness. There is no rebound.   Musculoskeletal: Normal range of motion. He exhibits no edema.   Lymphadenopathy:     He has no cervical adenopathy.   Neurological: He is alert and oriented to person, place, and time. He has normal strength.   Skin: Skin is warm and dry. Capillary refill takes less than 2 seconds. No pallor.   Psychiatric: He has a normal mood and affect.         ED Course   Procedures  Labs Reviewed   CBC W/ AUTO DIFFERENTIAL - Abnormal; Notable for the following components:       Result Value    Hemoglobin 19.2 (*)     Hematocrit 56.6 (*)     Mean Corpuscular Hemoglobin 32.3 (*)     Lymph # 0.9 (*)     Mono # 0.1 (*)     Gran% 88.1 (*)     Lymph% 10.7 (*)     Mono% 0.6 (*)     All other components within normal limits   COMPREHENSIVE METABOLIC PANEL - Abnormal; Notable for the following components:    Glucose 169 (*)     All other components within normal limits   TROPONIN I - Abnormal; Notable for the following components:    Troponin I 0.01 (*)     All other components within normal limits   TROPONIN I          Imaging Results          X-Ray Chest AP Portable (In process)                                       Clinical Impression:       ICD-10-CM ICD-9-CM   1. COPD exacerbation J44.1 491.21   2. Chest pain R07.9 786.50   3. Bronchitis J40 490                                Nuno Romero MD  07/11/19 2782

## 2019-07-11 NOTE — ED TRIAGE NOTES
Pt to ED with c/o sharp pain to left side of chest Xseveral hours. Pt also reports productive cough,  coughing up yellowish-green mucus x 4 days. Pt was seen in his primary care office today and was diagnosed with ear infection and was prescribed steroids.

## 2019-07-11 NOTE — ED NOTES
Bed: Exam 05  Expected date: 7/10/19  Expected time:   Means of arrival: Ambulance Service  Comments:

## 2019-07-17 ENCOUNTER — TELEPHONE (OUTPATIENT)
Dept: PAIN MEDICINE | Facility: CLINIC | Age: 54
End: 2019-07-17

## 2019-07-17 DIAGNOSIS — M25.562 PAIN IN BOTH KNEES, UNSPECIFIED CHRONICITY: Primary | ICD-10-CM

## 2019-07-17 DIAGNOSIS — M25.561 PAIN IN BOTH KNEES, UNSPECIFIED CHRONICITY: Primary | ICD-10-CM

## 2019-07-17 NOTE — TELEPHONE ENCOUNTER
----- Message from Morgan Delong sent at 7/17/2019  2:17 PM CDT -----  Contact: PATIENT  PATIENT SAID REYES OFFICE ORDERED PAIN MANAGEMENT ON 7/10/2019.  PATIENT HAS NOT HEARD BACK. PLEASE CALL PATIENT [phone]

## 2019-07-24 ENCOUNTER — OFFICE VISIT (OUTPATIENT)
Dept: FAMILY MEDICINE | Facility: CLINIC | Age: 54
End: 2019-07-24
Payer: MEDICARE

## 2019-07-24 VITALS
RESPIRATION RATE: 18 BRPM | HEART RATE: 67 BPM | OXYGEN SATURATION: 95 % | DIASTOLIC BLOOD PRESSURE: 83 MMHG | BODY MASS INDEX: 34.4 KG/M2 | WEIGHT: 219.19 LBS | HEIGHT: 67 IN | SYSTOLIC BLOOD PRESSURE: 120 MMHG

## 2019-07-24 DIAGNOSIS — J32.9 SINUSITIS, UNSPECIFIED CHRONICITY, UNSPECIFIED LOCATION: ICD-10-CM

## 2019-07-24 DIAGNOSIS — F31.9 BIPOLAR 1 DISORDER: Primary | ICD-10-CM

## 2019-07-24 DIAGNOSIS — N52.9 ERECTILE DYSFUNCTION, UNSPECIFIED ERECTILE DYSFUNCTION TYPE: ICD-10-CM

## 2019-07-24 PROCEDURE — 3008F BODY MASS INDEX DOCD: CPT | Mod: CPTII,S$GLB,, | Performed by: FAMILY MEDICINE

## 2019-07-24 PROCEDURE — 99214 OFFICE O/P EST MOD 30 MIN: CPT | Mod: S$GLB,,, | Performed by: FAMILY MEDICINE

## 2019-07-24 PROCEDURE — 99999 PR PBB SHADOW E&M-EST. PATIENT-LVL III: ICD-10-PCS | Mod: PBBFAC,,, | Performed by: FAMILY MEDICINE

## 2019-07-24 PROCEDURE — 99214 PR OFFICE/OUTPT VISIT, EST, LEVL IV, 30-39 MIN: ICD-10-PCS | Mod: S$GLB,,, | Performed by: FAMILY MEDICINE

## 2019-07-24 PROCEDURE — 3008F PR BODY MASS INDEX (BMI) DOCUMENTED: ICD-10-PCS | Mod: CPTII,S$GLB,, | Performed by: FAMILY MEDICINE

## 2019-07-24 PROCEDURE — 99999 PR PBB SHADOW E&M-EST. PATIENT-LVL III: CPT | Mod: PBBFAC,,, | Performed by: FAMILY MEDICINE

## 2019-07-24 RX ORDER — SILDENAFIL 50 MG/1
50 TABLET, FILM COATED ORAL DAILY PRN
Qty: 5 TABLET | Refills: 2 | Status: SHIPPED | OUTPATIENT
Start: 2019-07-24 | End: 2020-07-23

## 2019-07-24 RX ORDER — DOXYCYCLINE HYCLATE 100 MG
100 TABLET ORAL 2 TIMES DAILY
Qty: 20 TABLET | Refills: 0 | Status: SHIPPED | OUTPATIENT
Start: 2019-07-24 | End: 2019-08-13

## 2019-07-24 RX ORDER — LITHIUM CARBONATE 300 MG/1
300 CAPSULE ORAL
Qty: 90 CAPSULE | Refills: 3 | Status: SHIPPED | OUTPATIENT
Start: 2019-07-24

## 2019-07-24 NOTE — PROGRESS NOTES
"Subjective:       Patient ID: Braulio Kim Jr is a 54 y.o. male.    Chief Complaint: Follow-up and Otalgia    Follow up    Psych md no longer available  Needs refill on lithium    Still with sinus pressure  Ear pain    ED  Has taken sildenafil in the past    Review of Systems   Constitutional: Negative for activity change, appetite change, fatigue and fever.   HENT: Positive for congestion, ear pain, postnasal drip, rhinorrhea, sinus pressure, sinus pain, sneezing and sore throat. Negative for dental problem, ear discharge, hearing loss and trouble swallowing.    Eyes: Negative for photophobia and discharge.   Respiratory: Positive for shortness of breath and wheezing. Negative for apnea and cough.    Cardiovascular: Negative for chest pain.   Gastrointestinal: Negative for abdominal distention, abdominal pain, blood in stool, diarrhea, nausea and vomiting.   Endocrine: Negative for cold intolerance.   Genitourinary: Negative for difficulty urinating, flank pain, frequency, hematuria and testicular pain.   Musculoskeletal: Negative for arthralgias and myalgias.   Skin: Negative for color change.   Allergic/Immunologic: Negative for environmental allergies, food allergies and immunocompromised state.   Neurological: Negative for dizziness, syncope, numbness and headaches.   Hematological: Negative for adenopathy. Does not bruise/bleed easily.   Psychiatric/Behavioral: Negative for agitation, confusion, decreased concentration, hallucinations, self-injury and sleep disturbance.   All other systems reviewed and are negative.        Reviewed family, medical, surgical, and social history.    Objective:      /83 (BP Location: Right arm, Patient Position: Sitting, BP Method: Medium (Automatic))   Pulse 67   Resp 18   Ht 5' 7" (1.702 m)   Wt 99.4 kg (219 lb 3.2 oz)   SpO2 95%   BMI 34.33 kg/m²   Physical Exam   Constitutional: He is oriented to person, place, and time. He appears well-developed and " well-nourished. No distress.   HENT:   Head: Normocephalic and atraumatic.   Nose: Rhinorrhea present. Right sinus exhibits maxillary sinus tenderness and frontal sinus tenderness. Left sinus exhibits maxillary sinus tenderness and frontal sinus tenderness.   Mouth/Throat: Posterior oropharyngeal edema and posterior oropharyngeal erythema present. No oropharyngeal exudate.   Eyes: Pupils are equal, round, and reactive to light. Conjunctivae and EOM are normal.   Neck: Normal range of motion. No thyromegaly present.   Cardiovascular: Normal rate, regular rhythm, normal heart sounds and intact distal pulses. Exam reveals no gallop and no friction rub.   No murmur heard.  Pulmonary/Chest: Effort normal. No respiratory distress. He has wheezes. He has no rales.   Abdominal: Soft. He exhibits no distension. There is no tenderness. There is no guarding.   Musculoskeletal: Normal range of motion. He exhibits no edema, tenderness or deformity.   Neurological: He is alert and oriented to person, place, and time. He displays normal reflexes. No cranial nerve deficit or sensory deficit. He exhibits normal muscle tone. Coordination normal.   Skin: Skin is warm and dry. No rash noted. He is not diaphoretic. No erythema. No pallor.   Psychiatric: He has a normal mood and affect. His behavior is normal. Judgment and thought content normal.   Nursing note and vitals reviewed.      Assessment:       1. Bipolar 1 disorder    2. Sinusitis, unspecified chronicity, unspecified location    3. Erectile dysfunction, unspecified erectile dysfunction type        Plan:       Bipolar 1 disorder  -     lithium (ESKALITH) 300 MG capsule; Take 1 capsule (300 mg total) by mouth 3 (three) times daily with meals.  Dispense: 90 capsule; Refill: 3    Sinusitis, unspecified chronicity, unspecified location  -     doxycycline (VIBRA-TABS) 100 MG tablet; Take 1 tablet (100 mg total) by mouth 2 (two) times daily.  Dispense: 20 tablet; Refill:  0    Erectile dysfunction, unspecified erectile dysfunction type  -     sildenafil (VIAGRA) 50 MG tablet; Take 1 tablet (50 mg total) by mouth daily as needed for Erectile Dysfunction.  Dispense: 5 tablet; Refill: 2            Risks, benefits, and side effects were discussed with the patient. All questions were answered to the fullest satisfaction of the patient, and pt verbalized understanding and agreement to treatment plan. Pt was to call with any new or worsening symptoms, or present to the ER.

## 2019-08-01 ENCOUNTER — HOSPITAL ENCOUNTER (EMERGENCY)
Facility: HOSPITAL | Age: 54
Discharge: HOME | End: 2019-08-01
Attending: FAMILY MEDICINE
Payer: MEDICARE

## 2019-08-01 ENCOUNTER — NURSE TRIAGE (OUTPATIENT)
Dept: ADMINISTRATIVE | Facility: CLINIC | Age: 54
End: 2019-08-01

## 2019-08-01 VITALS
OXYGEN SATURATION: 98 % | RESPIRATION RATE: 18 BRPM | HEART RATE: 88 BPM | SYSTOLIC BLOOD PRESSURE: 156 MMHG | DIASTOLIC BLOOD PRESSURE: 99 MMHG | TEMPERATURE: 98 F | BODY MASS INDEX: 34.46 KG/M2 | WEIGHT: 220 LBS

## 2019-08-01 DIAGNOSIS — B37.2 CANDIDAL INTERTRIGO: Primary | ICD-10-CM

## 2019-08-01 LAB — POCT GLUCOSE: 85 MG/DL (ref 70–110)

## 2019-08-01 PROCEDURE — 82962 GLUCOSE BLOOD TEST: CPT

## 2019-08-01 PROCEDURE — 99283 EMERGENCY DEPT VISIT LOW MDM: CPT

## 2019-08-01 RX ORDER — KETOCONAZOLE 20 MG/G
CREAM TOPICAL DAILY
Qty: 30 G | Refills: 1 | Status: SHIPPED | OUTPATIENT
Start: 2019-08-01 | End: 2019-08-15

## 2019-08-01 NOTE — TELEPHONE ENCOUNTER
Patient states he started to take the antibiotics on the 8/24/19- doxycycline. Patient states that he started antibiotics and now has a yeast infection on his waistline. Patient states that he has red spots on bilateral arms that look like pimples. Patient states that he will go to the urgent care or the ED but states he has to see if a medical cab can bring him.     Reason for Disposition   Patient wants to be seen    Protocols used: RASH - WIDESPREAD ON DRUGS - DRUG [id number]

## 2019-08-01 NOTE — DISCHARGE INSTRUCTIONS
Keep skin folds clean and dry. Wear loose fitting clothes and pull shorts above lower abdomen (area of rash). Apply ketoconazole cream once daily for 2 weeks.

## 2019-08-01 NOTE — ED TRIAGE NOTES
Patient relays that he has a rash to his suprapubic region x 2 days  After taking doxycyccline    
72

## 2019-08-01 NOTE — ED PROVIDER NOTES
"Encounter Date: 8/1/2019       History     Chief Complaint   Patient presents with    Rash     Pt states he was given Rx for doxy 1 wk ago for treatment of left OM. States he developed "rash" to lower abdominal skin fold 3 days ago. States he still has pressure in his left ear. Denies fever/chills.         Review of patient's allergies indicates:   Allergen Reactions    Beeswax     Motrin [ibuprofen]     Penicillins      Past Medical History:   Diagnosis Date    Cardiac abnormality     Chronic back pain     Cirrhosis     Depression     Hepatitis C     PTSD (post-traumatic stress disorder)     Schizoaffective disorder, bipolar type     Schizophrenia      Past Surgical History:   Procedure Laterality Date    ENDOSCOPIC RELEASE OF BOTH CARPAL TUNNELS      TESTICLE SURGERY       Family History   Problem Relation Age of Onset    Lupus Sister      Social History     Tobacco Use    Smoking status: Current Every Day Smoker     Packs/day: 1.50     Types: Cigarettes    Smokeless tobacco: Never Used   Substance Use Topics    Alcohol use: Yes     Frequency: Never     Comment: 3 NIGHTS AGO    Drug use: No     Review of Systems   Constitutional: Negative for chills and fever.   HENT: Positive for congestion (nasal ) and ear pain. Negative for ear discharge, postnasal drip, rhinorrhea and sinus pressure.    Respiratory: Negative for cough and shortness of breath.    Gastrointestinal: Negative for abdominal pain, diarrhea, nausea and vomiting.   Genitourinary: Negative for dysuria.   Skin: Positive for rash (lower abdomen skin fold).   Neurological: Negative for dizziness and headaches.   All other systems reviewed and are negative.      Physical Exam     Initial Vitals   BP Pulse Resp Temp SpO2   08/01/19 1403 08/01/19 1358 08/01/19 1358 08/01/19 1358 08/01/19 1358   (!) 156/99 88 18 98 °F (36.7 °C) 98 %      MAP       --                Physical Exam    Nursing note and vitals reviewed.  Constitutional: He " appears well-developed and well-nourished. He is not diaphoretic. No distress.   HENT:   Head: Normocephalic and atraumatic.   Right Ear: Tympanic membrane and external ear normal. Tympanic membrane is not perforated, not erythematous and not retracted.   Left Ear: Tympanic membrane and external ear normal.   Mouth/Throat: Oropharynx is clear and moist.   Eyes: Right eye exhibits no discharge. Left eye exhibits no discharge. No scleral icterus.   Neck: Normal range of motion. Neck supple.   Cardiovascular: Normal rate, regular rhythm, normal heart sounds and intact distal pulses.   No murmur heard.  Pulmonary/Chest: Breath sounds normal. No respiratory distress.   Abdominal: Soft. Bowel sounds are normal. He exhibits no distension. There is no tenderness.   Musculoskeletal: Normal range of motion. He exhibits no edema.   Neurological: He is alert and oriented to person, place, and time.   Skin: Skin is warm and dry. Capillary refill takes less than 2 seconds. Rash (red diffuse rash to lower abdominal skin fold/suprapubic region with satellite lesions consistent with candidiasis ) noted.   Psychiatric: He has a normal mood and affect. His behavior is normal. Judgment and thought content normal.         ED Course   Procedures  Labs Reviewed   POCT GLUCOSE   POCT GLUCOSE MONITORING CONTINUOUS          Imaging Results    None          Medical Decision Making:   Differential Diagnosis:   Intertrigo, cellulitis                       Clinical Impression:       ICD-10-CM ICD-9-CM   1. [place] intertrigo B37.2 112.3         Disposition:   Disposition: Discharged                        Jenifer Soria NP  08/01/19 6830

## 2019-08-08 ENCOUNTER — NURSE TRIAGE (OUTPATIENT)
Dept: ADMINISTRATIVE | Facility: CLINIC | Age: 54
End: 2019-08-08

## 2019-08-08 ENCOUNTER — PATIENT OUTREACH (OUTPATIENT)
Dept: ADMINISTRATIVE | Facility: HOSPITAL | Age: 54
End: 2019-08-08

## 2019-08-08 NOTE — TELEPHONE ENCOUNTER
Reason for Disposition   [1] SEVERE back pain (e.g., excruciating, unable to do any normal activities) AND [2] not improved 2 hours after pain medicine    Protocols used: BACK PAIN-A-AH

## 2019-08-13 ENCOUNTER — HOSPITAL ENCOUNTER (EMERGENCY)
Facility: HOSPITAL | Age: 54
Discharge: HOME | End: 2019-08-13
Payer: MEDICARE

## 2019-08-13 VITALS
SYSTOLIC BLOOD PRESSURE: 131 MMHG | RESPIRATION RATE: 20 BRPM | HEART RATE: 67 BPM | HEIGHT: 67 IN | WEIGHT: 214 LBS | DIASTOLIC BLOOD PRESSURE: 88 MMHG | BODY MASS INDEX: 33.59 KG/M2 | TEMPERATURE: 98 F

## 2019-08-13 DIAGNOSIS — G89.29 CHRONIC RADICULAR LUMBAR PAIN: Primary | ICD-10-CM

## 2019-08-13 DIAGNOSIS — M54.16 CHRONIC RADICULAR LUMBAR PAIN: Primary | ICD-10-CM

## 2019-08-13 LAB
BACTERIA #/AREA URNS HPF: ABNORMAL /HPF
BILIRUB UR QL STRIP: NEGATIVE
CLARITY UR: CLEAR
COLOR UR: YELLOW
GLUCOSE UR QL STRIP: NEGATIVE
HGB UR QL STRIP: ABNORMAL
KETONES UR QL STRIP: NEGATIVE
LEUKOCYTE ESTERASE UR QL STRIP: NEGATIVE
MICROSCOPIC COMMENT: ABNORMAL
NITRITE UR QL STRIP: NEGATIVE
PH UR STRIP: 7 [PH] (ref 5–8)
PROT UR QL STRIP: NEGATIVE
RBC #/AREA URNS HPF: 8 /HPF (ref 0–4)
SP GR UR STRIP: 1.02 (ref 1–1.03)
SQUAMOUS #/AREA URNS HPF: 3 /HPF
URN SPEC COLLECT METH UR: ABNORMAL
UROBILINOGEN UR STRIP-ACNC: NEGATIVE EU/DL

## 2019-08-13 PROCEDURE — 99283 EMERGENCY DEPT VISIT LOW MDM: CPT

## 2019-08-13 PROCEDURE — 25000003 PHARM REV CODE 250: Performed by: NURSE PRACTITIONER

## 2019-08-13 PROCEDURE — 81000 URINALYSIS NONAUTO W/SCOPE: CPT

## 2019-08-13 RX ORDER — TRAMADOL HYDROCHLORIDE 50 MG/1
50 TABLET ORAL
Status: COMPLETED | OUTPATIENT
Start: 2019-08-13 | End: 2019-08-13

## 2019-08-13 RX ORDER — TRAMADOL HYDROCHLORIDE 50 MG/1
50 TABLET ORAL EVERY 6 HOURS PRN
Qty: 10 TABLET | Refills: 0 | Status: SHIPPED | OUTPATIENT
Start: 2019-08-13 | End: 2019-08-16

## 2019-08-13 RX ADMIN — TRAMADOL HYDROCHLORIDE 50 MG: 50 TABLET, FILM COATED ORAL at 04:08

## 2019-08-13 NOTE — ED NOTES
Patient was wanted to leave, said the NP had not been in yet.  Instructed him I'd let the NP know he was asking when he would be seen.

## 2019-08-13 NOTE — ED PROVIDER NOTES
"Encounter Date: 8/13/2019       History     Chief Complaint   Patient presents with    Back Pain     Has appoitment with Pain management on the 19th    Hand Pain     Braulio Kim is a 54 y.o male with PMHx including cardiac abnormality, chronic back pain, cirrhosis, depression, Hep C, PTSD and schizophrenia.     He presents to ED with worsening chronic lumbar pain  He reports lumbar pain since injuries sustained from fall years ago    He reports constant low back pain that radiates to right lower leg. He reports "pins and needles" sensation     He denies recent injury or trauma    He reports that he is taking flexeril at home with no relief in symptoms and "a Norco I had left over from dental pain"    He is scheduled to see pain management this month.    He reports increased urination. No urinary or bowel incontinence     No fever, body aches or chills    Lower extremities with normal neurological status              Review of patient's allergies indicates:   Allergen Reactions    Beeswax     Motrin [ibuprofen]     Penicillins      Past Medical History:   Diagnosis Date    Cardiac abnormality     Chronic back pain     Cirrhosis     Depression     Hepatitis C     PTSD (post-traumatic stress disorder)     Schizoaffective disorder, bipolar type     Schizophrenia      Past Surgical History:   Procedure Laterality Date    ENDOSCOPIC RELEASE OF BOTH CARPAL TUNNELS      TESTICLE SURGERY       Family History   Problem Relation Age of Onset    Lupus Sister      Social History     Tobacco Use    Smoking status: Current Every Day Smoker     Packs/day: 1.50     Types: Cigarettes    Smokeless tobacco: Never Used   Substance Use Topics    Alcohol use: Yes     Frequency: Never     Comment: 3 NIGHTS AGO    Drug use: No     Review of Systems   Constitutional: Negative.  Negative for fever.   HENT: Negative.  Negative for sore throat.    Eyes: Negative.    Respiratory: Negative.  Negative for shortness of breath.  "   Cardiovascular: Negative for chest pain.   Gastrointestinal: Negative for nausea.   Endocrine: Negative.    Genitourinary: Negative.  Negative for dysuria.   Musculoskeletal: Positive for arthralgias (right hand and leg) and back pain.   Skin: Negative for rash.   Allergic/Immunologic: Negative.    Neurological: Negative.  Negative for weakness.   Hematological: Negative.  Does not bruise/bleed easily.   Psychiatric/Behavioral: Negative.    All other systems reviewed and are negative.      Physical Exam     Initial Vitals [08/13/19 1447]   BP Pulse Resp Temp SpO2   131/88 67 20 98.3 °F (36.8 °C) --      MAP       --         Physical Exam    Nursing note and vitals reviewed.  Constitutional: He appears well-developed and well-nourished.   HENT:   Head: Normocephalic.   Eyes: Conjunctivae are normal.   Neck: Normal range of motion.   Cardiovascular: Normal rate.   Pulmonary/Chest: Breath sounds normal.   Musculoskeletal:        Right shoulder: Normal.        Lumbar back: He exhibits decreased range of motion, tenderness, bony tenderness, pain and spasm. He exhibits no swelling, no edema, no deformity, no laceration and normal pulse.        Back:    Neurological: He is alert and oriented to person, place, and time. GCS score is 15. GCS eye subscore is 4. GCS verbal subscore is 5. GCS motor subscore is 6.   Skin: Skin is warm.   Psychiatric: He has a normal mood and affect. His behavior is normal. Judgment and thought content normal.         ED Course   Procedures  Labs Reviewed   URINALYSIS, REFLEX TO URINE CULTURE - Abnormal; Notable for the following components:       Result Value    Occult Blood UA 1+ (*)     All other components within normal limits    Narrative:     Preferred Collection Type->Urine, Clean Catch   URINALYSIS MICROSCOPIC - Abnormal; Notable for the following components:    RBC, UA 8 (*)     All other components within normal limits    Narrative:     Preferred Collection Type->Urine, Clean Catch  "         Imaging Results    None          Medical Decision Making:   Initial Assessment:   Patient with worsening chronic lumbar pain. He reports lumbar pain since injuries sustained from fall years ago    He denies recent injury or trauma    He reports that he is taking flexeril at home with no relief in symptoms and "a Norco I had left over from dental pain"    He is scheduled to see pain management this month.    He reports increased urination. No urinary or bowel incontinence     No fever, body aches or chills    Lower extremities with normal neurological status          Differential Diagnosis:   Degenerative changes, herniated disc, arthritis    Infectious process    UTI      ED Management:  Med admin    Urine normal    Discussed physical exam findings with patient  No acute emergent medical condition identified at this time to warrant further testing/diagnostics  At this time, I believe the patient is clinically stable for discharge.   Patient to follow up with PCP in 1-2 days.  The patient acknowledges that close follow up with a MD is required after all ER visits  Pt given instructions; take all medications prescribed in the ER as directed.   Patient agrees to comply with all instruction and direction given in the ER  Pt agrees to return to ER if any symptoms reoccur                               Clinical Impression:       ICD-10-CM ICD-9-CM   1. Chronic radicular lumbar pain M54.16 724.4    G89.29 338.29                                Orly Kaminski NP  08/13/19 1653    "

## 2019-08-15 ENCOUNTER — TELEPHONE (OUTPATIENT)
Dept: PAIN MEDICINE | Facility: CLINIC | Age: 54
End: 2019-08-15

## 2019-08-15 NOTE — TELEPHONE ENCOUNTER
LVM advising patient that [facility] is out-of-network with [facility] and Dr. Devine is out-of-network with Medicaid. Advised patient to contact insurance to find provider in-network and let office know if referral is needed.

## 2020-03-26 ENCOUNTER — APPOINTMENT (OUTPATIENT)
Dept: CT IMAGING | Age: 55
DRG: 720 | End: 2020-03-26
Payer: MEDICAID

## 2020-03-26 ENCOUNTER — HOSPITAL ENCOUNTER (INPATIENT)
Age: 55
LOS: 4 days | Discharge: NURSING, REHAB OR LONG-TERM CARE | DRG: 720 | End: 2020-03-30
Attending: EMERGENCY MEDICINE | Admitting: INTERNAL MEDICINE
Payer: MEDICAID

## 2020-03-26 ENCOUNTER — APPOINTMENT (OUTPATIENT)
Dept: GENERAL RADIOLOGY | Age: 55
DRG: 720 | End: 2020-03-26
Payer: MEDICAID

## 2020-03-26 PROBLEM — A41.9 SEPTICEMIA (HCC): Status: ACTIVE | Noted: 2020-03-26

## 2020-03-26 PROBLEM — E87.20 LACTIC ACID ACIDOSIS: Status: ACTIVE | Noted: 2020-03-26

## 2020-03-26 LAB
ALBUMIN SERPL-MCNC: 3.8 G/DL (ref 3.4–5)
ALP BLD-CCNC: 68 U/L (ref 40–129)
ALT SERPL-CCNC: 39 U/L (ref 10–40)
ANION GAP SERPL CALCULATED.3IONS-SCNC: 17 MMOL/L (ref 3–16)
AST SERPL-CCNC: 29 U/L (ref 15–37)
BANDED NEUTROPHILS RELATIVE PERCENT: 4 % (ref 0–7)
BASOPHILS ABSOLUTE: 0 K/UL (ref 0–0.2)
BASOPHILS RELATIVE PERCENT: 0 %
BILIRUB SERPL-MCNC: 0.7 MG/DL (ref 0–1)
BILIRUBIN DIRECT: 0.3 MG/DL (ref 0–0.3)
BILIRUBIN URINE: ABNORMAL
BILIRUBIN, INDIRECT: 0.4 MG/DL (ref 0–1)
BLOOD, URINE: NEGATIVE
BUN BLDV-MCNC: 10 MG/DL (ref 7–20)
CALCIUM SERPL-MCNC: 8.8 MG/DL (ref 8.3–10.6)
CHLORIDE BLD-SCNC: 98 MMOL/L (ref 99–110)
CLARITY: CLEAR
CO2: 21 MMOL/L (ref 21–32)
COLOR: ABNORMAL
CREAT SERPL-MCNC: 0.7 MG/DL (ref 0.9–1.3)
EOSINOPHILS ABSOLUTE: 0 K/UL (ref 0–0.6)
EOSINOPHILS RELATIVE PERCENT: 0 %
EPITHELIAL CELLS, UA: 5 /HPF (ref 0–5)
GFR AFRICAN AMERICAN: >60
GFR NON-AFRICAN AMERICAN: >60
GLUCOSE BLD-MCNC: 235 MG/DL (ref 70–99)
GLUCOSE URINE: 250 MG/DL
HCT VFR BLD CALC: 43.5 % (ref 40.5–52.5)
HEMOGLOBIN: 14.7 G/DL (ref 13.5–17.5)
HYALINE CASTS: 19 /LPF (ref 0–8)
INR BLD: 1.38 (ref 0.86–1.14)
KETONES, URINE: NEGATIVE MG/DL
LACTIC ACID: 3.6 MMOL/L (ref 0.4–2)
LEUKOCYTE ESTERASE, URINE: ABNORMAL
LIPASE: 7 U/L (ref 13–60)
LYMPHOCYTES ABSOLUTE: 0.4 K/UL (ref 1–5.1)
LYMPHOCYTES RELATIVE PERCENT: 2 %
MAGNESIUM: 1.3 MG/DL (ref 1.8–2.4)
MCH RBC QN AUTO: 31.4 PG (ref 26–34)
MCHC RBC AUTO-ENTMCNC: 33.8 G/DL (ref 31–36)
MCV RBC AUTO: 92.9 FL (ref 80–100)
MICROSCOPIC EXAMINATION: YES
MONOCYTES ABSOLUTE: 1.1 K/UL (ref 0–1.3)
MONOCYTES RELATIVE PERCENT: 5 %
MUCUS: ABNORMAL /LPF
NEUTROPHILS ABSOLUTE: 20.3 K/UL (ref 1.7–7.7)
NEUTROPHILS RELATIVE PERCENT: 89 %
NITRITE, URINE: NEGATIVE
PDW BLD-RTO: 14.1 % (ref 12.4–15.4)
PH UA: 5.5 (ref 5–8)
PLATELET # BLD: 161 K/UL (ref 135–450)
PMV BLD AUTO: 10.2 FL (ref 5–10.5)
POTASSIUM REFLEX MAGNESIUM: 3.1 MMOL/L (ref 3.5–5.1)
PROTEIN UA: ABNORMAL MG/DL
PROTHROMBIN TIME: 16.1 SEC (ref 10–13.2)
RBC # BLD: 4.68 M/UL (ref 4.2–5.9)
RBC UA: 1 /HPF (ref 0–4)
SLIDE REVIEW: ABNORMAL
SODIUM BLD-SCNC: 136 MMOL/L (ref 136–145)
SPECIFIC GRAVITY UA: 1.02 (ref 1–1.03)
TOTAL PROTEIN: 6.7 G/DL (ref 6.4–8.2)
URINE REFLEX TO CULTURE: YES
URINE TYPE: ABNORMAL
UROBILINOGEN, URINE: 1 E.U./DL
VACUOLATED NEUTROPHILS: PRESENT
WBC # BLD: 21.8 K/UL (ref 4–11)
WBC UA: 36 /HPF (ref 0–5)

## 2020-03-26 PROCEDURE — 96367 TX/PROPH/DG ADDL SEQ IV INF: CPT

## 2020-03-26 PROCEDURE — 83690 ASSAY OF LIPASE: CPT

## 2020-03-26 PROCEDURE — 71260 CT THORAX DX C+: CPT

## 2020-03-26 PROCEDURE — 83605 ASSAY OF LACTIC ACID: CPT

## 2020-03-26 PROCEDURE — 99285 EMERGENCY DEPT VISIT HI MDM: CPT

## 2020-03-26 PROCEDURE — 6370000000 HC RX 637 (ALT 250 FOR IP): Performed by: EMERGENCY MEDICINE

## 2020-03-26 PROCEDURE — 80076 HEPATIC FUNCTION PANEL: CPT

## 2020-03-26 PROCEDURE — 2580000003 HC RX 258: Performed by: EMERGENCY MEDICINE

## 2020-03-26 PROCEDURE — 36415 COLL VENOUS BLD VENIPUNCTURE: CPT

## 2020-03-26 PROCEDURE — 81001 URINALYSIS AUTO W/SCOPE: CPT

## 2020-03-26 PROCEDURE — 87086 URINE CULTURE/COLONY COUNT: CPT

## 2020-03-26 PROCEDURE — 6360000004 HC RX CONTRAST MEDICATION: Performed by: EMERGENCY MEDICINE

## 2020-03-26 PROCEDURE — 74177 CT ABD & PELVIS W/CONTRAST: CPT

## 2020-03-26 PROCEDURE — 83735 ASSAY OF MAGNESIUM: CPT

## 2020-03-26 PROCEDURE — 85610 PROTHROMBIN TIME: CPT

## 2020-03-26 PROCEDURE — 85025 COMPLETE CBC W/AUTO DIFF WBC: CPT

## 2020-03-26 PROCEDURE — 80048 BASIC METABOLIC PNL TOTAL CA: CPT

## 2020-03-26 PROCEDURE — 71045 X-RAY EXAM CHEST 1 VIEW: CPT

## 2020-03-26 PROCEDURE — 96365 THER/PROPH/DIAG IV INF INIT: CPT

## 2020-03-26 PROCEDURE — 1200000000 HC SEMI PRIVATE

## 2020-03-26 PROCEDURE — 87040 BLOOD CULTURE FOR BACTERIA: CPT

## 2020-03-26 PROCEDURE — 6360000002 HC RX W HCPCS: Performed by: EMERGENCY MEDICINE

## 2020-03-26 RX ORDER — 0.9 % SODIUM CHLORIDE 0.9 %
1000 INTRAVENOUS SOLUTION INTRAVENOUS ONCE
Status: COMPLETED | OUTPATIENT
Start: 2020-03-26 | End: 2020-03-26

## 2020-03-26 RX ORDER — ACETAMINOPHEN 325 MG/1
650 TABLET ORAL ONCE
Status: COMPLETED | OUTPATIENT
Start: 2020-03-26 | End: 2020-03-26

## 2020-03-26 RX ORDER — POTASSIUM CHLORIDE 750 MG/1
40 TABLET, FILM COATED, EXTENDED RELEASE ORAL ONCE
Status: COMPLETED | OUTPATIENT
Start: 2020-03-26 | End: 2020-03-26

## 2020-03-26 RX ADMIN — POTASSIUM CHLORIDE 40 MEQ: 750 TABLET, FILM COATED, EXTENDED RELEASE ORAL at 21:27

## 2020-03-26 RX ADMIN — SODIUM CHLORIDE 1000 ML: 9 INJECTION, SOLUTION INTRAVENOUS at 22:40

## 2020-03-26 RX ADMIN — SODIUM CHLORIDE 1000 ML: 9 INJECTION, SOLUTION INTRAVENOUS at 21:20

## 2020-03-26 RX ADMIN — MEROPENEM 1 G: 1 INJECTION, POWDER, FOR SOLUTION INTRAVENOUS at 21:20

## 2020-03-26 RX ADMIN — ACETAMINOPHEN 650 MG: 325 TABLET ORAL at 20:28

## 2020-03-26 RX ADMIN — VANCOMYCIN HYDROCHLORIDE 1000 MG: 1 INJECTION, POWDER, LYOPHILIZED, FOR SOLUTION INTRAVENOUS at 22:05

## 2020-03-26 RX ADMIN — SODIUM CHLORIDE 1000 ML: 9 INJECTION, SOLUTION INTRAVENOUS at 20:29

## 2020-03-26 RX ADMIN — IOPAMIDOL 75 ML: 755 INJECTION, SOLUTION INTRAVENOUS at 20:58

## 2020-03-26 ASSESSMENT — ENCOUNTER SYMPTOMS
EYE ITCHING: 0
DIARRHEA: 0
ANAL BLEEDING: 0
CHEST TIGHTNESS: 0
CHOKING: 0
BACK PAIN: 0
EYE DISCHARGE: 0
EYE PAIN: 0
EYE REDNESS: 0
RECTAL PAIN: 0
CONSTIPATION: 0
COUGH: 0
NAUSEA: 1
SHORTNESS OF BREATH: 0
WHEEZING: 0
ABDOMINAL DISTENTION: 0
APNEA: 0
ABDOMINAL PAIN: 0
VOMITING: 1
PHOTOPHOBIA: 0
BLOOD IN STOOL: 0
COLOR CHANGE: 0
STRIDOR: 0

## 2020-03-26 ASSESSMENT — PAIN DESCRIPTION - ONSET: ONSET: ON-GOING

## 2020-03-26 ASSESSMENT — PAIN DESCRIPTION - DESCRIPTORS: DESCRIPTORS: ACHING

## 2020-03-26 ASSESSMENT — PAIN - FUNCTIONAL ASSESSMENT: PAIN_FUNCTIONAL_ASSESSMENT: PREVENTS OR INTERFERES SOME ACTIVE ACTIVITIES AND ADLS

## 2020-03-26 ASSESSMENT — PAIN SCALES - GENERAL
PAINLEVEL_OUTOF10: 10

## 2020-03-26 ASSESSMENT — PAIN DESCRIPTION - FREQUENCY: FREQUENCY: CONTINUOUS

## 2020-03-26 ASSESSMENT — PAIN DESCRIPTION - ORIENTATION: ORIENTATION: RIGHT

## 2020-03-26 ASSESSMENT — PAIN DESCRIPTION - PROGRESSION: CLINICAL_PROGRESSION: NOT CHANGED

## 2020-03-26 ASSESSMENT — PAIN DESCRIPTION - PAIN TYPE: TYPE: CHRONIC PAIN

## 2020-03-26 ASSESSMENT — PAIN DESCRIPTION - LOCATION: LOCATION: LEG

## 2020-03-27 ENCOUNTER — APPOINTMENT (OUTPATIENT)
Dept: GENERAL RADIOLOGY | Age: 55
DRG: 720 | End: 2020-03-27
Payer: MEDICAID

## 2020-03-27 PROBLEM — G82.20 PARAPLEGIA (HCC): Status: ACTIVE | Noted: 2020-03-27

## 2020-03-27 PROBLEM — E87.6 HYPOKALEMIA: Status: ACTIVE | Noted: 2020-03-27

## 2020-03-27 PROBLEM — L02.415 CELLULITIS AND ABSCESS OF RIGHT LOWER EXTREMITY: Status: ACTIVE | Noted: 2020-03-27

## 2020-03-27 PROBLEM — S31.000D SACRAL WOUND, SUBSEQUENT ENCOUNTER: Status: ACTIVE | Noted: 2020-03-27

## 2020-03-27 PROBLEM — L03.115 CELLULITIS AND ABSCESS OF RIGHT LOWER EXTREMITY: Status: ACTIVE | Noted: 2020-03-27

## 2020-03-27 PROBLEM — Z87.828 HISTORY OF GUNSHOT WOUND: Status: ACTIVE | Noted: 2020-03-27

## 2020-03-27 LAB
A/G RATIO: 0.9 (ref 1.1–2.2)
ALBUMIN SERPL-MCNC: 3 G/DL (ref 3.4–5)
ALP BLD-CCNC: 59 U/L (ref 40–129)
ALT SERPL-CCNC: 48 U/L (ref 10–40)
ANION GAP SERPL CALCULATED.3IONS-SCNC: 13 MMOL/L (ref 3–16)
AST SERPL-CCNC: 50 U/L (ref 15–37)
BASOPHILS ABSOLUTE: 0.1 K/UL (ref 0–0.2)
BASOPHILS RELATIVE PERCENT: 0.5 %
BILIRUB SERPL-MCNC: 0.7 MG/DL (ref 0–1)
BUN BLDV-MCNC: 10 MG/DL (ref 7–20)
C-REACTIVE PROTEIN: 267.7 MG/L (ref 0–5.1)
CALCIUM SERPL-MCNC: 8.2 MG/DL (ref 8.3–10.6)
CHLORIDE BLD-SCNC: 98 MMOL/L (ref 99–110)
CO2: 21 MMOL/L (ref 21–32)
CORTISOL TOTAL: 33.2 UG/DL
CREAT SERPL-MCNC: 0.7 MG/DL (ref 0.9–1.3)
EOSINOPHILS ABSOLUTE: 0 K/UL (ref 0–0.6)
EOSINOPHILS RELATIVE PERCENT: 0 %
GFR AFRICAN AMERICAN: >60
GFR NON-AFRICAN AMERICAN: >60
GLOBULIN: 3.3 G/DL
GLUCOSE BLD-MCNC: 198 MG/DL (ref 70–99)
GLUCOSE BLD-MCNC: 212 MG/DL (ref 70–99)
GLUCOSE BLD-MCNC: 219 MG/DL (ref 70–99)
GLUCOSE BLD-MCNC: 240 MG/DL (ref 70–99)
GLUCOSE BLD-MCNC: 244 MG/DL (ref 70–99)
GLUCOSE BLD-MCNC: 245 MG/DL (ref 70–99)
HCT VFR BLD CALC: 42.3 % (ref 40.5–52.5)
HEMOGLOBIN: 14.3 G/DL (ref 13.5–17.5)
INR BLD: 2.07 (ref 0.86–1.14)
INR BLD: 3.58 (ref 0.86–1.14)
LACTIC ACID, SEPSIS: 1.6 MMOL/L (ref 0.4–1.9)
LACTIC ACID, SEPSIS: 2.1 MMOL/L (ref 0.4–1.9)
LACTIC ACID, SEPSIS: 3.7 MMOL/L (ref 0.4–1.9)
LYMPHOCYTES ABSOLUTE: 0.5 K/UL (ref 1–5.1)
LYMPHOCYTES RELATIVE PERCENT: 3 %
MAGNESIUM: 1.6 MG/DL (ref 1.8–2.4)
MCH RBC QN AUTO: 31.3 PG (ref 26–34)
MCHC RBC AUTO-ENTMCNC: 33.7 G/DL (ref 31–36)
MCV RBC AUTO: 93 FL (ref 80–100)
MONOCYTES ABSOLUTE: 0.3 K/UL (ref 0–1.3)
MONOCYTES RELATIVE PERCENT: 1.7 %
NEUTROPHILS ABSOLUTE: 15.7 K/UL (ref 1.7–7.7)
NEUTROPHILS RELATIVE PERCENT: 94.8 %
PDW BLD-RTO: 13.9 % (ref 12.4–15.4)
PERFORMED ON: ABNORMAL
PLATELET # BLD: 139 K/UL (ref 135–450)
PMV BLD AUTO: 10.1 FL (ref 5–10.5)
POTASSIUM REFLEX MAGNESIUM: 3.5 MMOL/L (ref 3.5–5.1)
PROCALCITONIN: 17.8 NG/ML (ref 0–0.15)
PROTHROMBIN TIME: 24.2 SEC (ref 10–13.2)
PROTHROMBIN TIME: 42.1 SEC (ref 10–13.2)
RBC # BLD: 4.55 M/UL (ref 4.2–5.9)
SEDIMENTATION RATE, ERYTHROCYTE: 15 MM/HR (ref 0–20)
SODIUM BLD-SCNC: 132 MMOL/L (ref 136–145)
TOTAL CK: 823 U/L (ref 39–308)
TOTAL CK: 844 U/L (ref 39–308)
TOTAL PROTEIN: 6.3 G/DL (ref 6.4–8.2)
WBC # BLD: 16.5 K/UL (ref 4–11)

## 2020-03-27 PROCEDURE — 80053 COMPREHEN METABOLIC PANEL: CPT

## 2020-03-27 PROCEDURE — APPSS45 APP SPLIT SHARED TIME 31-45 MINUTES: Performed by: NURSE PRACTITIONER

## 2020-03-27 PROCEDURE — 6370000000 HC RX 637 (ALT 250 FOR IP): Performed by: NURSE PRACTITIONER

## 2020-03-27 PROCEDURE — 99255 IP/OBS CONSLTJ NEW/EST HI 80: CPT | Performed by: INTERNAL MEDICINE

## 2020-03-27 PROCEDURE — 6370000000 HC RX 637 (ALT 250 FOR IP): Performed by: FAMILY MEDICINE

## 2020-03-27 PROCEDURE — 82550 ASSAY OF CK (CPK): CPT

## 2020-03-27 PROCEDURE — 6360000002 HC RX W HCPCS: Performed by: NURSE PRACTITIONER

## 2020-03-27 PROCEDURE — 83605 ASSAY OF LACTIC ACID: CPT

## 2020-03-27 PROCEDURE — 36415 COLL VENOUS BLD VENIPUNCTURE: CPT

## 2020-03-27 PROCEDURE — 85652 RBC SED RATE AUTOMATED: CPT

## 2020-03-27 PROCEDURE — 2580000003 HC RX 258: Performed by: NURSE PRACTITIONER

## 2020-03-27 PROCEDURE — 73630 X-RAY EXAM OF FOOT: CPT

## 2020-03-27 PROCEDURE — 82533 TOTAL CORTISOL: CPT

## 2020-03-27 PROCEDURE — 83735 ASSAY OF MAGNESIUM: CPT

## 2020-03-27 PROCEDURE — 84145 PROCALCITONIN (PCT): CPT

## 2020-03-27 PROCEDURE — 1200000000 HC SEMI PRIVATE

## 2020-03-27 PROCEDURE — 94760 N-INVAS EAR/PLS OXIMETRY 1: CPT

## 2020-03-27 PROCEDURE — 85025 COMPLETE CBC W/AUTO DIFF WBC: CPT

## 2020-03-27 PROCEDURE — 85610 PROTHROMBIN TIME: CPT

## 2020-03-27 PROCEDURE — 2500000003 HC RX 250 WO HCPCS: Performed by: INTERNAL MEDICINE

## 2020-03-27 PROCEDURE — 86140 C-REACTIVE PROTEIN: CPT

## 2020-03-27 PROCEDURE — 99253 IP/OBS CNSLTJ NEW/EST LOW 45: CPT | Performed by: SURGERY

## 2020-03-27 PROCEDURE — APPNB60 APP NON BILLABLE TIME 46-60 MINS: Performed by: NURSE PRACTITIONER

## 2020-03-27 PROCEDURE — 93970 EXTREMITY STUDY: CPT

## 2020-03-27 RX ORDER — ASCORBIC ACID 500 MG
500 TABLET ORAL 3 TIMES DAILY
Status: DISCONTINUED | OUTPATIENT
Start: 2020-03-27 | End: 2020-03-27

## 2020-03-27 RX ORDER — FAMOTIDINE 20 MG/1
20 TABLET, FILM COATED ORAL EVERY EVENING
Status: DISCONTINUED | OUTPATIENT
Start: 2020-03-27 | End: 2020-03-30 | Stop reason: HOSPADM

## 2020-03-27 RX ORDER — ALBUTEROL SULFATE 2.5 MG/3ML
2.5 SOLUTION RESPIRATORY (INHALATION) EVERY 4 HOURS PRN
COMMUNITY

## 2020-03-27 RX ORDER — MAGNESIUM SULFATE 1 G/100ML
1 INJECTION INTRAVENOUS PRN
Status: DISCONTINUED | OUTPATIENT
Start: 2020-03-27 | End: 2020-03-30 | Stop reason: HOSPADM

## 2020-03-27 RX ORDER — ACETAMINOPHEN 325 MG/1
650 TABLET ORAL EVERY 6 HOURS PRN
Status: DISCONTINUED | OUTPATIENT
Start: 2020-03-27 | End: 2020-03-30 | Stop reason: HOSPADM

## 2020-03-27 RX ORDER — SODIUM CHLORIDE 0.9 % (FLUSH) 0.9 %
10 SYRINGE (ML) INJECTION EVERY 12 HOURS SCHEDULED
Status: DISCONTINUED | OUTPATIENT
Start: 2020-03-27 | End: 2020-03-30 | Stop reason: HOSPADM

## 2020-03-27 RX ORDER — BACLOFEN 10 MG/1
10 TABLET ORAL 3 TIMES DAILY
Status: DISCONTINUED | OUTPATIENT
Start: 2020-03-27 | End: 2020-03-30 | Stop reason: HOSPADM

## 2020-03-27 RX ORDER — GABAPENTIN 300 MG/1
600 CAPSULE ORAL 3 TIMES DAILY
Status: DISCONTINUED | OUTPATIENT
Start: 2020-03-27 | End: 2020-03-30 | Stop reason: HOSPADM

## 2020-03-27 RX ORDER — ATENOLOL 25 MG/1
25 TABLET ORAL DAILY
Status: DISCONTINUED | OUTPATIENT
Start: 2020-03-27 | End: 2020-03-30 | Stop reason: HOSPADM

## 2020-03-27 RX ORDER — SODIUM CHLORIDE 0.9 % (FLUSH) 0.9 %
10 SYRINGE (ML) INJECTION PRN
Status: DISCONTINUED | OUTPATIENT
Start: 2020-03-27 | End: 2020-03-30 | Stop reason: HOSPADM

## 2020-03-27 RX ORDER — GUAIFENESIN 600 MG/1
600 TABLET, EXTENDED RELEASE ORAL 2 TIMES DAILY PRN
COMMUNITY

## 2020-03-27 RX ORDER — MIRTAZAPINE 15 MG/1
15 TABLET, FILM COATED ORAL NIGHTLY
Status: DISCONTINUED | OUTPATIENT
Start: 2020-03-27 | End: 2020-03-27

## 2020-03-27 RX ORDER — DEXTROSE MONOHYDRATE 25 G/50ML
12.5 INJECTION, SOLUTION INTRAVENOUS PRN
Status: DISCONTINUED | OUTPATIENT
Start: 2020-03-27 | End: 2020-03-30 | Stop reason: HOSPADM

## 2020-03-27 RX ORDER — OXYBUTYNIN CHLORIDE 5 MG/1
5 TABLET, EXTENDED RELEASE ORAL DAILY
Status: DISCONTINUED | OUTPATIENT
Start: 2020-03-27 | End: 2020-03-30 | Stop reason: HOSPADM

## 2020-03-27 RX ORDER — DEXTROSE MONOHYDRATE 50 MG/ML
100 INJECTION, SOLUTION INTRAVENOUS PRN
Status: DISCONTINUED | OUTPATIENT
Start: 2020-03-27 | End: 2020-03-30 | Stop reason: HOSPADM

## 2020-03-27 RX ORDER — OXYCODONE HYDROCHLORIDE AND ACETAMINOPHEN 5; 325 MG/1; MG/1
1 TABLET ORAL EVERY 4 HOURS PRN
Status: DISCONTINUED | OUTPATIENT
Start: 2020-03-27 | End: 2020-03-30 | Stop reason: HOSPADM

## 2020-03-27 RX ORDER — SODIUM CHLORIDE 9 MG/ML
INJECTION, SOLUTION INTRAVENOUS CONTINUOUS
Status: DISCONTINUED | OUTPATIENT
Start: 2020-03-27 | End: 2020-03-30 | Stop reason: HOSPADM

## 2020-03-27 RX ORDER — ERGOCALCIFEROL 1.25 MG/1
50000 CAPSULE ORAL
COMMUNITY

## 2020-03-27 RX ORDER — LOSARTAN POTASSIUM 25 MG/1
25 TABLET ORAL DAILY
Status: DISCONTINUED | OUTPATIENT
Start: 2020-03-27 | End: 2020-03-27

## 2020-03-27 RX ORDER — POTASSIUM CHLORIDE 20 MEQ/1
40 TABLET, EXTENDED RELEASE ORAL PRN
Status: DISCONTINUED | OUTPATIENT
Start: 2020-03-27 | End: 2020-03-30 | Stop reason: HOSPADM

## 2020-03-27 RX ORDER — FERROUS SULFATE 325(65) MG
325 TABLET ORAL
Status: DISCONTINUED | OUTPATIENT
Start: 2020-03-27 | End: 2020-03-27

## 2020-03-27 RX ORDER — CLINDAMYCIN PHOSPHATE 600 MG/50ML
600 INJECTION INTRAVENOUS EVERY 8 HOURS
Status: DISCONTINUED | OUTPATIENT
Start: 2020-03-27 | End: 2020-03-30

## 2020-03-27 RX ORDER — INSULIN GLARGINE 100 [IU]/ML
35 INJECTION, SOLUTION SUBCUTANEOUS NIGHTLY
Status: ON HOLD | COMMUNITY
End: 2020-03-30 | Stop reason: SDUPTHER

## 2020-03-27 RX ORDER — NICOTINE POLACRILEX 4 MG
15 LOZENGE BUCCAL PRN
Status: DISCONTINUED | OUTPATIENT
Start: 2020-03-27 | End: 2020-03-30 | Stop reason: HOSPADM

## 2020-03-27 RX ORDER — M-VIT,TX,IRON,MINS/CALC/FOLIC 27MG-0.4MG
1 TABLET ORAL DAILY
Status: DISCONTINUED | OUTPATIENT
Start: 2020-03-27 | End: 2020-03-30 | Stop reason: HOSPADM

## 2020-03-27 RX ORDER — CETIRIZINE HYDROCHLORIDE 10 MG/1
10 TABLET ORAL DAILY
Status: DISCONTINUED | OUTPATIENT
Start: 2020-03-27 | End: 2020-03-27

## 2020-03-27 RX ORDER — INSULIN GLARGINE 100 [IU]/ML
35 INJECTION, SOLUTION SUBCUTANEOUS NIGHTLY
Status: DISCONTINUED | OUTPATIENT
Start: 2020-03-27 | End: 2020-03-29

## 2020-03-27 RX ORDER — ATORVASTATIN CALCIUM 20 MG/1
20 TABLET, FILM COATED ORAL NIGHTLY
Status: DISCONTINUED | OUTPATIENT
Start: 2020-03-27 | End: 2020-03-30 | Stop reason: HOSPADM

## 2020-03-27 RX ORDER — TRAZODONE HYDROCHLORIDE 50 MG/1
12.5 TABLET ORAL NIGHTLY
Status: DISCONTINUED | OUTPATIENT
Start: 2020-03-27 | End: 2020-03-30 | Stop reason: HOSPADM

## 2020-03-27 RX ORDER — ONDANSETRON 2 MG/ML
4 INJECTION INTRAMUSCULAR; INTRAVENOUS EVERY 6 HOURS PRN
Status: DISCONTINUED | OUTPATIENT
Start: 2020-03-27 | End: 2020-03-30 | Stop reason: HOSPADM

## 2020-03-27 RX ORDER — POTASSIUM CHLORIDE 7.45 MG/ML
10 INJECTION INTRAVENOUS PRN
Status: DISCONTINUED | OUTPATIENT
Start: 2020-03-27 | End: 2020-03-30 | Stop reason: HOSPADM

## 2020-03-27 RX ORDER — ACETAMINOPHEN 650 MG/1
650 SUPPOSITORY RECTAL EVERY 6 HOURS PRN
Status: DISCONTINUED | OUTPATIENT
Start: 2020-03-27 | End: 2020-03-30 | Stop reason: HOSPADM

## 2020-03-27 RX ADMIN — RIVAROXABAN 20 MG: 20 TABLET, FILM COATED ORAL at 02:29

## 2020-03-27 RX ADMIN — FAMOTIDINE 20 MG: 20 TABLET, FILM COATED ORAL at 01:56

## 2020-03-27 RX ADMIN — FAMOTIDINE 20 MG: 20 TABLET, FILM COATED ORAL at 17:07

## 2020-03-27 RX ADMIN — MEROPENEM 2 G: 1 INJECTION, POWDER, FOR SOLUTION INTRAVENOUS at 02:43

## 2020-03-27 RX ADMIN — Medication 1500 MG: at 08:44

## 2020-03-27 RX ADMIN — CLINDAMYCIN PHOSPHATE 600 MG: 600 INJECTION, SOLUTION INTRAVENOUS at 23:06

## 2020-03-27 RX ADMIN — ATORVASTATIN CALCIUM 20 MG: 20 TABLET, FILM COATED ORAL at 20:27

## 2020-03-27 RX ADMIN — BACLOFEN 10 MG: 10 TABLET ORAL at 08:42

## 2020-03-27 RX ADMIN — INSULIN LISPRO 2 UNITS: 100 INJECTION, SOLUTION INTRAVENOUS; SUBCUTANEOUS at 01:56

## 2020-03-27 RX ADMIN — INSULIN LISPRO 4 UNITS: 100 INJECTION, SOLUTION INTRAVENOUS; SUBCUTANEOUS at 12:33

## 2020-03-27 RX ADMIN — GABAPENTIN 600 MG: 300 CAPSULE ORAL at 20:27

## 2020-03-27 RX ADMIN — GABAPENTIN 600 MG: 300 CAPSULE ORAL at 14:00

## 2020-03-27 RX ADMIN — INSULIN LISPRO 4 UNITS: 100 INJECTION, SOLUTION INTRAVENOUS; SUBCUTANEOUS at 17:08

## 2020-03-27 RX ADMIN — OXYCODONE HYDROCHLORIDE AND ACETAMINOPHEN 1 TABLET: 5; 325 TABLET ORAL at 15:52

## 2020-03-27 RX ADMIN — OXYCODONE HYDROCHLORIDE AND ACETAMINOPHEN 1 TABLET: 5; 325 TABLET ORAL at 08:58

## 2020-03-27 RX ADMIN — LOSARTAN POTASSIUM 25 MG: 25 TABLET ORAL at 08:42

## 2020-03-27 RX ADMIN — SODIUM CHLORIDE: 9 INJECTION, SOLUTION INTRAVENOUS at 01:55

## 2020-03-27 RX ADMIN — OXYCODONE HYDROCHLORIDE AND ACETAMINOPHEN 1 TABLET: 5; 325 TABLET ORAL at 20:27

## 2020-03-27 RX ADMIN — OXYCODONE HYDROCHLORIDE AND ACETAMINOPHEN 1 TABLET: 5; 325 TABLET ORAL at 02:29

## 2020-03-27 RX ADMIN — BACLOFEN 10 MG: 10 TABLET ORAL at 14:00

## 2020-03-27 RX ADMIN — MAGNESIUM SULFATE HEPTAHYDRATE 1 G: 1 INJECTION, SOLUTION INTRAVENOUS at 17:09

## 2020-03-27 RX ADMIN — TRAZODONE HYDROCHLORIDE 12.5 MG: 50 TABLET ORAL at 20:27

## 2020-03-27 RX ADMIN — ONDANSETRON HYDROCHLORIDE 4 MG: 2 SOLUTION INTRAMUSCULAR; INTRAVENOUS at 02:40

## 2020-03-27 RX ADMIN — INSULIN LISPRO 4 UNITS: 100 INJECTION, SOLUTION INTRAVENOUS; SUBCUTANEOUS at 08:43

## 2020-03-27 RX ADMIN — GABAPENTIN 600 MG: 300 CAPSULE ORAL at 08:42

## 2020-03-27 RX ADMIN — MEROPENEM 2 G: 1 INJECTION, POWDER, FOR SOLUTION INTRAVENOUS at 12:22

## 2020-03-27 RX ADMIN — INSULIN LISPRO 1 UNITS: 100 INJECTION, SOLUTION INTRAVENOUS; SUBCUTANEOUS at 20:30

## 2020-03-27 RX ADMIN — INSULIN GLARGINE 35 UNITS: 100 INJECTION, SOLUTION SUBCUTANEOUS at 20:30

## 2020-03-27 RX ADMIN — MULTIPLE VITAMINS W/ MINERALS TAB 1 TABLET: TAB at 08:42

## 2020-03-27 RX ADMIN — Medication 1500 MG: at 19:00

## 2020-03-27 RX ADMIN — TRAZODONE HYDROCHLORIDE 12.5 MG: 50 TABLET ORAL at 01:56

## 2020-03-27 RX ADMIN — ATORVASTATIN CALCIUM 20 MG: 20 TABLET, FILM COATED ORAL at 01:56

## 2020-03-27 RX ADMIN — BACLOFEN 10 MG: 10 TABLET ORAL at 20:27

## 2020-03-27 RX ADMIN — ATENOLOL 25 MG: 25 TABLET ORAL at 15:50

## 2020-03-27 RX ADMIN — OXYBUTYNIN CHLORIDE 5 MG: 5 TABLET, EXTENDED RELEASE ORAL at 08:42

## 2020-03-27 ASSESSMENT — PAIN - FUNCTIONAL ASSESSMENT
PAIN_FUNCTIONAL_ASSESSMENT: PREVENTS OR INTERFERES SOME ACTIVE ACTIVITIES AND ADLS

## 2020-03-27 ASSESSMENT — PAIN DESCRIPTION - ONSET
ONSET: ON-GOING
ONSET: AWAKENED FROM SLEEP

## 2020-03-27 ASSESSMENT — PAIN DESCRIPTION - LOCATION
LOCATION: LEG

## 2020-03-27 ASSESSMENT — PAIN DESCRIPTION - ORIENTATION
ORIENTATION: RIGHT

## 2020-03-27 ASSESSMENT — PAIN DESCRIPTION - DESCRIPTORS
DESCRIPTORS: ACHING
DESCRIPTORS: ACHING;BURNING
DESCRIPTORS: ACHING;DISCOMFORT

## 2020-03-27 ASSESSMENT — PAIN SCALES - GENERAL
PAINLEVEL_OUTOF10: 10
PAINLEVEL_OUTOF10: 9
PAINLEVEL_OUTOF10: 7
PAINLEVEL_OUTOF10: 7
PAINLEVEL_OUTOF10: 10
PAINLEVEL_OUTOF10: 10
PAINLEVEL_OUTOF10: 7
PAINLEVEL_OUTOF10: 0

## 2020-03-27 ASSESSMENT — PAIN DESCRIPTION - PAIN TYPE
TYPE: ACUTE PAIN
TYPE: CHRONIC PAIN

## 2020-03-27 ASSESSMENT — PAIN DESCRIPTION - FREQUENCY
FREQUENCY: CONTINUOUS

## 2020-03-27 ASSESSMENT — PAIN DESCRIPTION - PROGRESSION
CLINICAL_PROGRESSION: NOT CHANGED
CLINICAL_PROGRESSION: NOT CHANGED

## 2020-03-27 NOTE — ED PROVIDER NOTES
[facility]  eMERGENCY dEPARTMENT eNCOUnter      Pt Name: Keely Leonard  MRN: [id number]  [place] 1965  Date of evaluation: 3/26/2020  Provider: Octavio Richter MD    CHIEF COMPLAINT       Chief Complaint   Patient presents with    Fever     Pt from nursing home with a fever and vomiting that \"started around 2 hours ago\". Nursing home reports pt had temp of \"104.7\". Patient had temperature of 101.3 on arrival.    Emesis       HISTORY OF PRESENT ILLNESS    Keely Leonard is a 48 y.o. male who presents to the emergency department with fever. Endorses fever and emesis. No abdominal pain, chest pain or SOB. Hx paraplegia from [facility] with sacral ulcer osteomyelitis. Nothing makes symptoms better or worse. No other associated symptoms. Nursing Notes were reviewed. REVIEW OF SYSTEMS       Review of Systems   Constitutional: Positive for fever. Negative for activity change, appetite change, chills, diaphoresis, fatigue and unexpected weight change. HENT: Negative for congestion, dental problem, drooling, ear discharge and ear pain. Eyes: Negative for photophobia, pain, discharge, redness, itching and visual disturbance. Respiratory: Negative for apnea, cough, choking, chest tightness, shortness of breath, wheezing and stridor. Cardiovascular: Negative for chest pain, palpitations and leg swelling. Gastrointestinal: Positive for nausea and vomiting. Negative for abdominal distention, abdominal pain, anal bleeding, blood in stool, constipation, diarrhea and rectal pain. Endocrine: Negative for cold intolerance and heat intolerance. Genitourinary: Negative for decreased urine volume and urgency. Musculoskeletal: Negative for arthralgias and back pain. Skin: Negative for color change and pallor. Neurological: Negative for dizziness and facial asymmetry. Hematological: Negative for adenopathy. Does not bruise/bleed easily.    Psychiatric/Behavioral: All other components within normal limits    Narrative:     Performed at:  [place] [address] [place], [place] [employer]   Phone [phone]   URINE RT REFLEX TO CULTURE - Abnormal; Notable for the following components:    Glucose, Ur 250 (*)     Bilirubin Urine SMALL (*)     Protein, UA TRACE (*)     Leukocyte Esterase, Urine SMALL (*)     All other components within normal limits    Narrative:     Performed at:  [address] [employer]   Phone [phone]   PROTIME-INR - Abnormal; Notable for the following components:    Protime 16.1 (*)     INR 1.38 (*)     All other components within normal limits    Narrative:     Performed at:  [address] [employer]   Phone [phone]   MAGNESIUM - Abnormal; Notable for the following components:    Magnesium 1.30 (*)     All other components within normal limits    Narrative:     Performed at:  [address] [employer]   Phone [phone]   MICROSCOPIC URINALYSIS - Abnormal; Notable for the following components:    Mucus, UA 1+ (*)     Hyaline Casts, UA 19 (*)     WBC, UA 36 (*)     All other components within normal limits    Narrative:     Performed at:  [address] [employer]   Phone [phone]   LACTIC ACID, PLASMA - Abnormal; Notable for the following components:    Lactic Acid 3.6 (*)     All other components within normal limits    Narrative:     Performed at:  [address] [employer]   Phone [phone]   CULTURE, BLOOD 1   CULTURE, BLOOD 1   RAPID INFLUENZA A/B ANTIGENS   CULTURE, URINE   HEPATIC FUNCTION PANEL    Narrative:     Performed at:  [address] [place] [address]  0026
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
## 2020-03-27 NOTE — PROGRESS NOTES
Noted with bladder incont x 3 today.  Electronically signed by Jacob Merritt RN on 3/27/2020 at 4:24 PM

## 2020-03-27 NOTE — PROGRESS NOTES
Hospital Medicine Progress Note     Date:  3/27/2020    PCP: Spencer Crowell (Tel: None)    Date of Admission: 3/26/2020    Subjective  Patient lying in bed, complains of right lower extremity pain and right groin pain. Objective  Physical exam:  Vitals: /70   Pulse 94   Temp 98.8 °F (37.1 °C) (Oral)   Resp 16   Ht 5' 8\" (1.727 m)   Wt 229 lb 0.9 oz (103.9 kg)   SpO2 96%   BMI 34.83 kg/m²   Gen: Not in distress. Alert. Head: Normocephalic. Atraumatic. Eyes: Conjunctiva clear  ENT: Oral mucosa moist  Neck: No JVD. No obvious thyromegaly. CVS: Nml S1S2, no MRG, tachycardia  Pulmomary: Clear bilaterally. No crackles. No wheezes. Gastrointestinal: Soft, NT/ND. Positive bowel sounds. Musculoskeletal: Right lower extremity edema, warm  Neuro: No focal deficit. Moves extremity spontaneously. Psychiatry: Appropriate affect. Not agitated. Skin: Right lower extremity edema, erythema, warmth, tenderness to palpation  Right inguinal lymphadenopathy tenderness to palpation      24HR INTAKE/OUTPUT:  No intake or output data in the 24 hours ending 03/27/20 1552  No intake/output data recorded. No intake/output data recorded.       Meds:    atorvastatin  20 mg Oral Nightly    baclofen  10 mg Oral TID    famotidine  20 mg Oral QPM    gabapentin  600 mg Oral TID    therapeutic multivitamin-minerals  1 tablet Oral Daily    oxybutynin  5 mg Oral Daily    traZODone  12.5 mg Oral Nightly    insulin lispro  0-12 Units Subcutaneous TID     insulin lispro  0-6 Units Subcutaneous Nightly    sodium chloride flush  10 mL Intravenous 2 times per day    insulin glargine  35 Units Subcutaneous Nightly    vancomycin (VANCOCIN) intermittent dosing (placeholder)   Other RX Placeholder    vancomycin  1,500 mg Intravenous Q12H    enoxaparin  1 mg/kg Subcutaneous BID    atenolol  25 mg Oral Daily       Infusions:    sodium chloride 75 mL/hr at 03/27/20 1546    dextrose           PRN Meds: sodium

## 2020-03-27 NOTE — CARE COORDINATION
[facility] Wound Ostomy Continence Nurse  Consult Note       NAME:  Lisette ERVIN RECORD NUMBER:  [id number]  AGE: 48 y.o. GENDER: male  : 1965  TODAY'S DATE:  3/27/2020    Subjective   Reason for WOCN Evaluation and Assessment: sacral wound      Naman Parker is a 48 y.o. male referred by:   [] Physician  [x] Nursing  [] Other:     Wound Identification:  Wound Type: undetermined  Contributing Factors: none    Wound History: pt is paraplegic, hx of chronic osteo. Current Wound Care Treatment:  EYAD    Patient Goal of Care:  [x] Wound Healing  [] Odor Control  [] Palliative Care  [] Pain Control   [] Other:         PAST MEDICAL HISTORY        Diagnosis Date    Chronic embolism and thrombosis of unspecified deep veins of right lower extremity (HCC)     Chronic pain     Essential hypertension     Osteomyelitis of vertebra (HCC)     Paraplegia (HCC)        PAST SURGICAL HISTORY    History reviewed. No pertinent surgical history. FAMILY HISTORY    History reviewed. No pertinent family history. SOCIAL HISTORY    Social History     Tobacco Use    Smoking status: Never Smoker    Smokeless tobacco: Never Used   Substance Use Topics    Alcohol use: Not on file    Drug use: Not on file       ALLERGIES    Allergies   Allergen Reactions    Ciprofloxacin     Ertapenem     Pcn [Penicillins]        MEDICATIONS    No current facility-administered medications on file prior to encounter.       Current Outpatient Medications on File Prior to Encounter   Medication Sig Dispense Refill    albuterol (PROVENTIL) (2.5 MG/3ML) 0.083% nebulizer solution Take 2.5 mg by nebulization every 4 hours as needed for Wheezing or Shortness of Breath      insulin aspart (NOVOLOG) 100 UNIT/ML injection vial Inject 10 Units into the skin 3 times daily (before meals)      insulin glargine (LANTUS) 100 UNIT/ML injection vial Inject 35 Units into the skin nightly      guaiFENesin (MUCINEX) 600 MG extended release tablet Take 600 mg by mouth 2 times daily as needed for Congestion       vitamin D (ERGOCALCIFEROL) 1.25 MG (23340 UT) CAPS capsule Take 50,000 Units by mouth every 30 days On the 18th      rivaroxaban (XARELTO) 20 MG TABS tablet Take 20 mg by mouth Daily with supper      atenolol (TENORMIN) 25 MG tablet Take 25 mg by mouth daily      atorvastatin (LIPITOR) 20 MG tablet Take 20 mg by mouth nightly      baclofen (LIORESAL) 10 MG tablet Take 10 mg by mouth 3 times daily      famotidine (PEPCID) 20 MG tablet Take 20 mg by mouth every evening       furosemide (LASIX) 20 MG tablet Take 20 mg by mouth daily      gabapentin (NEURONTIN) 300 MG capsule Take 600 mg by mouth 3 times daily. Dariela Keller glucagon (GLUCAGON EMERGENCY) 1 MG injection Infuse 1 kit intravenously as needed (hypoglycemia)      glucose (GLUTOSE) 40 % GEL Take 15 g by mouth as needed      loperamide (IMODIUM) 2 MG capsule Take 2 mg by mouth 4 times daily as needed for Diarrhea      losartan (COZAAR) 25 MG tablet Take 25 mg by mouth daily      melatonin 3 MG TABS tablet Take 3 mg by mouth every evening      oxybutynin (DITROPAN-XL) 5 MG extended release tablet Take 5 mg by mouth daily      oxyCODONE-acetaminophen (PERCOCET) 5-325 MG per tablet Take 1 tablet by mouth every 8 hours as needed for Pain.        Multiple Vitamins-Minerals (THERAPEUTIC MULTIVITAMIN-MINERALS) tablet Take 1 tablet by mouth daily      traZODone (DESYREL) 50 MG tablet Take 12.5 mg by mouth nightly       ondansetron (ZOFRAN) 4 MG tablet Take 4 mg by mouth every 8 hours as needed for Nausea or Vomiting         Objective    /70   Pulse 109   Temp 100.1 °F (37.8 °C) (Oral)   Resp 16   Ht 5' 8\" (1.727 m)   Wt 229 lb 0.9 oz (103.9 kg)   SpO2 92%   BMI 34.83 kg/m²     LABS:  WBC:    Lab Results   Component Value Date    WBC 16.5 03/27/2020     H/H:    Lab Results   Component Value Date    HGB 14.3 03/27/2020    HCT 42.3 03/27/2020     PTT:  No results found

## 2020-03-27 NOTE — CONSULTS
Vascular Lower Extremities DVT Study Procedure    -- PRELIMINARY SONOGRAPHER REPORT --      Demographics      Patient Name      BRUNO RUSSELL      Date of Study     03/27/2020           Gender             Male      Patient Number    [id number]           Date of Birth      1965      Visit Number      [id number]            Age                54 year(s)      Accession Number  [id number]            Room Number        4252      Corporate ID      [id number]              Sonographer       Clarissa Mcdermott RVT                                                             CCT      Ordering          Bhavna,               Interpreting       [facility]   Physician         Quita Zazueta MD    Physician          Surg     Procedure    Type of Study:      Veins:Lower Extremities DVT Study, VASC EXTREMITY VENOUS DUPLEX BILATERAL.     Tech Comments  Right  Technically difficult exam on the right due to patient unable to tolerate  probe pressure. No evidence of deep vein or superficial vein thrombosis involving the right  lower extremity. Incidental finding on the right: groin, enlarged (vascularized) lymph node  measiring 4.7 cm by 1.3 cm. Edema noted throughout the right lower extremity. Left  No evidence of deep vein or superficial vein thrombosis involving the left  lower extremity. ASSESSMENT/PLAN:      48 y.o. male admitted with   1. Septicemia ([address]    2. Cellulitis of sacral region    3. RLE cellulitis    With a history of:    Patient Active Problem List   Diagnosis    DM2 (diabetes mellitus, type 2) ([address]    HTN (hypertension)    Septicemia ([address]    Lactic acid acidosis    Hypokalemia         Venous duplex - negative for DVT  RLE - red, warm, cellulitis  abtx  RLE compression and elevation  Continue medical care    Will review and discuss with attending physician. Further recommendations to follow. EDUCATION:   Educated patient on plan of care and disease process.   - all questions

## 2020-03-28 LAB
ANION GAP SERPL CALCULATED.3IONS-SCNC: 9 MMOL/L (ref 3–16)
BUN BLDV-MCNC: 12 MG/DL (ref 7–20)
CALCIUM SERPL-MCNC: 8.4 MG/DL (ref 8.3–10.6)
CHLORIDE BLD-SCNC: 101 MMOL/L (ref 99–110)
CO2: 23 MMOL/L (ref 21–32)
CREAT SERPL-MCNC: 0.7 MG/DL (ref 0.9–1.3)
ESTIMATED AVERAGE GLUCOSE: 228.8 MG/DL
GFR AFRICAN AMERICAN: >60
GFR NON-AFRICAN AMERICAN: >60
GLUCOSE BLD-MCNC: 180 MG/DL (ref 70–99)
GLUCOSE BLD-MCNC: 190 MG/DL (ref 70–99)
GLUCOSE BLD-MCNC: 198 MG/DL (ref 70–99)
GLUCOSE BLD-MCNC: 221 MG/DL (ref 70–99)
GLUCOSE BLD-MCNC: 235 MG/DL (ref 70–99)
HBA1C MFR BLD: 9.6 %
HCT VFR BLD CALC: 41.5 % (ref 40.5–52.5)
HEMOGLOBIN: 13.8 G/DL (ref 13.5–17.5)
MCH RBC QN AUTO: 31.3 PG (ref 26–34)
MCHC RBC AUTO-ENTMCNC: 33.3 G/DL (ref 31–36)
MCV RBC AUTO: 94 FL (ref 80–100)
PDW BLD-RTO: 14.2 % (ref 12.4–15.4)
PERFORMED ON: ABNORMAL
PLATELET # BLD: 135 K/UL (ref 135–450)
PMV BLD AUTO: 10.4 FL (ref 5–10.5)
POTASSIUM REFLEX MAGNESIUM: 3.7 MMOL/L (ref 3.5–5.1)
RBC # BLD: 4.42 M/UL (ref 4.2–5.9)
SODIUM BLD-SCNC: 133 MMOL/L (ref 136–145)
TOTAL CK: 405 U/L (ref 39–308)
URINE CULTURE, ROUTINE: NORMAL
VANCOMYCIN TROUGH: 10.2 UG/ML (ref 10–20)
WBC # BLD: 11.6 K/UL (ref 4–11)

## 2020-03-28 PROCEDURE — 2500000003 HC RX 250 WO HCPCS: Performed by: INTERNAL MEDICINE

## 2020-03-28 PROCEDURE — 6360000002 HC RX W HCPCS: Performed by: FAMILY MEDICINE

## 2020-03-28 PROCEDURE — 6370000000 HC RX 637 (ALT 250 FOR IP): Performed by: NURSE PRACTITIONER

## 2020-03-28 PROCEDURE — 2580000003 HC RX 258: Performed by: NURSE PRACTITIONER

## 2020-03-28 PROCEDURE — 82550 ASSAY OF CK (CPK): CPT

## 2020-03-28 PROCEDURE — 84145 PROCALCITONIN (PCT): CPT

## 2020-03-28 PROCEDURE — 87186 SC STD MICRODIL/AGAR DIL: CPT

## 2020-03-28 PROCEDURE — 80048 BASIC METABOLIC PNL TOTAL CA: CPT

## 2020-03-28 PROCEDURE — 6360000002 HC RX W HCPCS: Performed by: NURSE PRACTITIONER

## 2020-03-28 PROCEDURE — 99233 SBSQ HOSP IP/OBS HIGH 50: CPT | Performed by: INTERNAL MEDICINE

## 2020-03-28 PROCEDURE — 85027 COMPLETE CBC AUTOMATED: CPT

## 2020-03-28 PROCEDURE — 87205 SMEAR GRAM STAIN: CPT

## 2020-03-28 PROCEDURE — 87077 CULTURE AEROBIC IDENTIFY: CPT

## 2020-03-28 PROCEDURE — 94760 N-INVAS EAR/PLS OXIMETRY 1: CPT

## 2020-03-28 PROCEDURE — 83036 HEMOGLOBIN GLYCOSYLATED A1C: CPT

## 2020-03-28 PROCEDURE — 6370000000 HC RX 637 (ALT 250 FOR IP): Performed by: FAMILY MEDICINE

## 2020-03-28 PROCEDURE — 86063 ANTISTREPTOLYSIN O SCREEN: CPT

## 2020-03-28 PROCEDURE — 80202 ASSAY OF VANCOMYCIN: CPT

## 2020-03-28 PROCEDURE — 99232 SBSQ HOSP IP/OBS MODERATE 35: CPT | Performed by: SURGERY

## 2020-03-28 PROCEDURE — 86060 ANTISTREPTOLYSIN O TITER: CPT

## 2020-03-28 PROCEDURE — 36415 COLL VENOUS BLD VENIPUNCTURE: CPT

## 2020-03-28 PROCEDURE — 87070 CULTURE OTHR SPECIMN AEROBIC: CPT

## 2020-03-28 PROCEDURE — 1200000000 HC SEMI PRIVATE

## 2020-03-28 RX ORDER — POLYETHYLENE GLYCOL 3350 17 G/17G
17 POWDER, FOR SOLUTION ORAL DAILY
Status: DISCONTINUED | OUTPATIENT
Start: 2020-03-28 | End: 2020-03-29

## 2020-03-28 RX ADMIN — FAMOTIDINE 20 MG: 20 TABLET, FILM COATED ORAL at 17:13

## 2020-03-28 RX ADMIN — Medication 1500 MG: at 12:54

## 2020-03-28 RX ADMIN — CLINDAMYCIN PHOSPHATE 600 MG: 600 INJECTION, SOLUTION INTRAVENOUS at 15:26

## 2020-03-28 RX ADMIN — CLINDAMYCIN PHOSPHATE 600 MG: 600 INJECTION, SOLUTION INTRAVENOUS at 05:31

## 2020-03-28 RX ADMIN — MULTIPLE VITAMINS W/ MINERALS TAB 1 TABLET: TAB at 08:08

## 2020-03-28 RX ADMIN — CLINDAMYCIN PHOSPHATE 600 MG: 600 INJECTION, SOLUTION INTRAVENOUS at 22:08

## 2020-03-28 RX ADMIN — ATENOLOL 25 MG: 25 TABLET ORAL at 08:08

## 2020-03-28 RX ADMIN — INSULIN GLARGINE 35 UNITS: 100 INJECTION, SOLUTION SUBCUTANEOUS at 21:44

## 2020-03-28 RX ADMIN — OXYBUTYNIN CHLORIDE 5 MG: 5 TABLET, EXTENDED RELEASE ORAL at 08:08

## 2020-03-28 RX ADMIN — OXYCODONE HYDROCHLORIDE AND ACETAMINOPHEN 1 TABLET: 5; 325 TABLET ORAL at 21:43

## 2020-03-28 RX ADMIN — OXYCODONE HYDROCHLORIDE AND ACETAMINOPHEN 1 TABLET: 5; 325 TABLET ORAL at 06:53

## 2020-03-28 RX ADMIN — INSULIN LISPRO 1 UNITS: 100 INJECTION, SOLUTION INTRAVENOUS; SUBCUTANEOUS at 21:43

## 2020-03-28 RX ADMIN — ATORVASTATIN CALCIUM 20 MG: 20 TABLET, FILM COATED ORAL at 21:42

## 2020-03-28 RX ADMIN — BACLOFEN 10 MG: 10 TABLET ORAL at 15:26

## 2020-03-28 RX ADMIN — OXYCODONE HYDROCHLORIDE AND ACETAMINOPHEN 1 TABLET: 5; 325 TABLET ORAL at 12:57

## 2020-03-28 RX ADMIN — GABAPENTIN 600 MG: 300 CAPSULE ORAL at 15:26

## 2020-03-28 RX ADMIN — INSULIN LISPRO 2 UNITS: 100 INJECTION, SOLUTION INTRAVENOUS; SUBCUTANEOUS at 12:53

## 2020-03-28 RX ADMIN — BACLOFEN 10 MG: 10 TABLET ORAL at 08:08

## 2020-03-28 RX ADMIN — INSULIN LISPRO 4 UNITS: 100 INJECTION, SOLUTION INTRAVENOUS; SUBCUTANEOUS at 17:13

## 2020-03-28 RX ADMIN — TRAZODONE HYDROCHLORIDE 12.5 MG: 50 TABLET ORAL at 21:41

## 2020-03-28 RX ADMIN — BACLOFEN 10 MG: 10 TABLET ORAL at 21:43

## 2020-03-28 RX ADMIN — INSULIN LISPRO 4 UNITS: 100 INJECTION, SOLUTION INTRAVENOUS; SUBCUTANEOUS at 08:07

## 2020-03-28 RX ADMIN — GABAPENTIN 600 MG: 300 CAPSULE ORAL at 08:07

## 2020-03-28 RX ADMIN — ENOXAPARIN SODIUM 100 MG: 100 INJECTION SUBCUTANEOUS at 08:08

## 2020-03-28 RX ADMIN — GABAPENTIN 600 MG: 300 CAPSULE ORAL at 21:42

## 2020-03-28 RX ADMIN — ENOXAPARIN SODIUM 100 MG: 100 INJECTION SUBCUTANEOUS at 21:41

## 2020-03-28 ASSESSMENT — PAIN DESCRIPTION - PROGRESSION
CLINICAL_PROGRESSION: NOT CHANGED

## 2020-03-28 ASSESSMENT — PAIN DESCRIPTION - DESCRIPTORS
DESCRIPTORS: ACHING

## 2020-03-28 ASSESSMENT — PAIN DESCRIPTION - ORIENTATION
ORIENTATION: RIGHT

## 2020-03-28 ASSESSMENT — PAIN SCALES - GENERAL
PAINLEVEL_OUTOF10: 10
PAINLEVEL_OUTOF10: 7
PAINLEVEL_OUTOF10: 10
PAINLEVEL_OUTOF10: 10
PAINLEVEL_OUTOF10: 7

## 2020-03-28 ASSESSMENT — PAIN DESCRIPTION - LOCATION
LOCATION: LEG;OTHER (COMMENT)
LOCATION: LEG
LOCATION: LEG

## 2020-03-28 ASSESSMENT — PAIN - FUNCTIONAL ASSESSMENT
PAIN_FUNCTIONAL_ASSESSMENT: PREVENTS OR INTERFERES SOME ACTIVE ACTIVITIES AND ADLS

## 2020-03-28 ASSESSMENT — PAIN DESCRIPTION - PAIN TYPE
TYPE: ACUTE PAIN

## 2020-03-28 ASSESSMENT — PAIN DESCRIPTION - ONSET
ONSET: ON-GOING

## 2020-03-28 ASSESSMENT — PAIN DESCRIPTION - FREQUENCY
FREQUENCY: CONTINUOUS

## 2020-03-28 NOTE — PROGRESS NOTES
Subcutaneous, TID WC  insulin lispro (HUMALOG) injection vial 0-6 Units, 0-6 Units, Subcutaneous, Nightly  sodium chloride flush 0.9 % injection 10 mL, 10 mL, Intravenous, 2 times per day  sodium chloride flush 0.9 % injection 10 mL, 10 mL, Intravenous, PRN  acetaminophen (TYLENOL) tablet 650 mg, 650 mg, Oral, Q6H PRN **OR** acetaminophen (TYLENOL) suppository 650 mg, 650 mg, Rectal, Q6H PRN  0.9 % sodium chloride infusion, , Intravenous, Continuous  glucose (GLUTOSE) 40 % oral gel 15 g, 15 g, Oral, PRN  dextrose 50 % IV solution, 12.5 g, Intravenous, PRN  glucagon (rDNA) injection 1 mg, 1 mg, Intramuscular, PRN  dextrose 5 % solution, 100 mL/hr, Intravenous, PRN  potassium chloride (KLOR-CON M) extended release tablet 40 mEq, 40 mEq, Oral, PRN **OR** potassium bicarb-citric acid (EFFER-K) effervescent tablet 40 mEq, 40 mEq, Oral, PRN **OR** potassium chloride 10 mEq/100 mL IVPB (Peripheral Line), 10 mEq, Intravenous, PRN  magnesium sulfate 1 g in dextrose 5% 100 mL IVPB, 1 g, Intravenous, PRN  insulin glargine (LANTUS) injection vial 35 Units, 35 Units, Subcutaneous, Nightly  oxyCODONE-acetaminophen (PERCOCET) 5-325 MG per tablet 1 tablet, 1 tablet, Oral, Q4H PRN  ondansetron (ZOFRAN) injection 4 mg, 4 mg, Intravenous, Q6H PRN  vancomycin (VANCOCIN) intermittent dosing (placeholder), , Other, RX Placeholder  vancomycin (VANCOCIN) 1500 mg in dextrose 5 % 250 mL IVPB, 1,500 mg, Intravenous, Q12H  enoxaparin (LOVENOX) injection 100 mg, 1 mg/kg, Subcutaneous, BID  atenolol (TENORMIN) tablet 25 mg, 25 mg, Oral, Daily  clindamycin (CLEOCIN) 600 mg in dextrose 5 % 50 mL IVPB, 600 mg, Intravenous, Q8H  Prior to Admission medications    Medication Sig Start Date End Date Taking?  Authorizing Provider   albuterol (PROVENTIL) (2.5 MG/3ML) 0.083% nebulizer solution Take 2.5 mg by nebulization every 4 hours as needed for Wheezing or Shortness of Breath   Yes Historical Provider, MD   insulin aspart (NOVOLOG) 100 UNIT/ML wound         Venous duplex - negative for DVT  RLE - red, warm, cellulitis  abtx  RLE compression and elevation  Continue medical care      EDUCATION:   Educated patient on plan of care and disease process. - all questions answered  Discussed with patient and nursing. Thank you for allowing us to participate in the care of Miguel Martin. Otilia Christopher, CNP  Pt seen and examined. Agree with TRAVIS Dow note. Labs reviewed. Patient paraplegic for many years has some movement of his thigh also has tenderness in lymph nodes of the groin red cellulitic look to the entire right leg not on the left right leg eschars medially and laterally from his plastic procedure with a rotated flap to cover his sacral decubitus femoral pulses strong foot is warm swollen no palpable pedal pulses.   Duplex scan reviewed no DVT I believe this is cellulitis and nothing to do with arterial ischemia

## 2020-03-28 NOTE — PROGRESS NOTES
 atenolol (TENORMIN) 25 MG tablet Take 25 mg by mouth daily      atorvastatin (LIPITOR) 20 MG tablet Take 20 mg by mouth nightly      baclofen (LIORESAL) 10 MG tablet Take 10 mg by mouth 3 times daily      famotidine (PEPCID) 20 MG tablet Take 20 mg by mouth every evening       furosemide (LASIX) 20 MG tablet Take 20 mg by mouth daily      gabapentin (NEURONTIN) 300 MG capsule Take 600 mg by mouth 3 times daily. Lindy Clancy glucagon (GLUCAGON EMERGENCY) 1 MG injection Infuse 1 kit intravenously as needed (hypoglycemia)      glucose (GLUTOSE) 40 % GEL Take 15 g by mouth as needed      loperamide (IMODIUM) 2 MG capsule Take 2 mg by mouth 4 times daily as needed for Diarrhea      losartan (COZAAR) 25 MG tablet Take 25 mg by mouth daily      melatonin 3 MG TABS tablet Take 3 mg by mouth every evening      oxybutynin (DITROPAN-XL) 5 MG extended release tablet Take 5 mg by mouth daily      oxyCODONE-acetaminophen (PERCOCET) 5-325 MG per tablet Take 1 tablet by mouth every 8 hours as needed for Pain.  Multiple Vitamins-Minerals (THERAPEUTIC MULTIVITAMIN-MINERALS) tablet Take 1 tablet by mouth daily      traZODone (DESYREL) 50 MG tablet Take 12.5 mg by mouth nightly       ondansetron (ZOFRAN) 4 MG tablet Take 4 mg by mouth every 8 hours as needed for Nausea or Vomiting         Allergies:  Ciprofloxacin; Ertapenem; and Pcn [penicillins]    Immunizations : There is no immunization history on file for this patient. Social History:    Social History     Tobacco Use    Smoking status: Never Smoker    Smokeless tobacco: Never Used   Substance Use Topics    Alcohol use: Not on file    Drug use: Not on file     Social History     Tobacco Use   Smoking Status Never Smoker   Smokeless Tobacco Never Used      Family History  : no DVT no COPD        REVIEW OF SYSTEMS:    No fever / chills / sweats. No weight loss. No visual change, eye pain, eye discharge. No oral lesion, sore throat, dysphagia.   Denies 2215    Blood Culture, Routine No Growth to date.  Any change in status will be called. Narrative:     ORDER#: [id number]                          ORDERED BY: Betsy Leon  SOURCE: Blood                              COLLECTED:  03/26/20 20:38  ANTIBIOTICS AT LEXY. :                      RECEIVED :  03/26/20 20:46  If child <=2 yrs old please draw pediatric bottle. ~Blood Culture #1  Performed at:  [facility]  [address], [place] [address]   Phone [phone]   Culture, Blood 1 [[id number]] Collected: 03/26/20 2016   Order Status: Completed Specimen: Blood Updated: 03/27/20 2215    Blood Culture, Routine No Growth to date.  Any change in status will be called. Narrative:     ORDER#: [id number]                          ORDERED BY: Betsy Leon  SOURCE: Blood                              COLLECTED:  03/26/20 20:16  ANTIBIOTICS AT LEXY. :                      RECEIVED :  03/26/20 20:19  If child <=2 yrs old please draw pediatric bottle. ~Blood Culture #1  Performed at:  [facility]  [address], [place] [address]   Phone [phone]   Culture, Wound [[id number]]    Order Status: No result Specimen: Sacrum    Rapid influenza A/B antigens [[id number]] Collected: 03/26/20 2016   Order Status: Sent Specimen: Nasopharyngeal            IMAGING:    XR FOOT RIGHT (MIN 3 VIEWS)   Final Result   Marked soft tissue swelling. No soft tissue gas or radiopaque foreign body. No radiographic evidence of osteomyelitis is identified at this time. VL Extremity Venous Bilateral   Final Result      CT ABDOMEN PELVIS W IV CONTRAST Additional Contrast? None   Final Result   No evidence of pulmonary embolism or acute pulmonary abnormality. No aortic dissection or aneurysm. No acute abdominopelvic findings. Cholelithiasis without evidence of cholecystitis. Hepatomegaly with steatosis. Normal appendix.       Right inguinal and iliac chain lymphadenopathy with some adjacent fat   stranding, suggesting inflammation and lymphadenitis. This is presumably   reactive although would recommend clinical follow-up. If persistent,   consideration could be given to follow-up inguinal ultrasound. CT CHEST PULMONARY EMBOLISM W CONTRAST   Final Result   No evidence of pulmonary embolism or acute pulmonary abnormality. No aortic dissection or aneurysm. No acute abdominopelvic findings. Cholelithiasis without evidence of cholecystitis. Hepatomegaly with steatosis. Normal appendix. Right inguinal and iliac chain lymphadenopathy with some adjacent fat   stranding, suggesting inflammation and lymphadenitis. This is presumably   reactive although would recommend clinical follow-up. If persistent,   consideration could be given to follow-up inguinal ultrasound. XR CHEST PORTABLE   Final Result   No acute cardiopulmonary pathology.                All the pertinent images and reports for the current Hospitalization were reviewed by me     Scheduled Meds:   atorvastatin  20 mg Oral Nightly    baclofen  10 mg Oral TID    famotidine  20 mg Oral QPM    gabapentin  600 mg Oral TID    therapeutic multivitamin-minerals  1 tablet Oral Daily    oxybutynin  5 mg Oral Daily    traZODone  12.5 mg Oral Nightly    insulin lispro  0-12 Units Subcutaneous TID     insulin lispro  0-6 Units Subcutaneous Nightly    sodium chloride flush  10 mL Intravenous 2 times per day    insulin glargine  35 Units Subcutaneous Nightly    vancomycin (VANCOCIN) intermittent dosing (placeholder)   Other RX Placeholder    vancomycin  1,500 mg Intravenous Q12H    enoxaparin  1 mg/kg Subcutaneous BID    atenolol  25 mg Oral Daily    clindamycin (CLEOCIN) IV  600 mg Intravenous Q8H       Continuous Infusions:   sodium chloride 75 mL/hr at 03/27/20 1546    dextrose         PRN Meds:  sodium chloride flush, acetaminophen

## 2020-03-29 LAB
ANION GAP SERPL CALCULATED.3IONS-SCNC: 13 MMOL/L (ref 3–16)
BUN BLDV-MCNC: 9 MG/DL (ref 7–20)
CALCIUM SERPL-MCNC: 8.6 MG/DL (ref 8.3–10.6)
CHLORIDE BLD-SCNC: 103 MMOL/L (ref 99–110)
CO2: 23 MMOL/L (ref 21–32)
CREAT SERPL-MCNC: 0.5 MG/DL (ref 0.9–1.3)
GFR AFRICAN AMERICAN: >60
GFR NON-AFRICAN AMERICAN: >60
GLUCOSE BLD-MCNC: 150 MG/DL (ref 70–99)
GLUCOSE BLD-MCNC: 156 MG/DL (ref 70–99)
GLUCOSE BLD-MCNC: 194 MG/DL (ref 70–99)
GLUCOSE BLD-MCNC: 195 MG/DL (ref 70–99)
GLUCOSE BLD-MCNC: 243 MG/DL (ref 70–99)
HCT VFR BLD CALC: 40.2 % (ref 40.5–52.5)
HEMOGLOBIN: 13.7 G/DL (ref 13.5–17.5)
INR BLD: 1.1 (ref 0.86–1.14)
MCH RBC QN AUTO: 32 PG (ref 26–34)
MCHC RBC AUTO-ENTMCNC: 34 G/DL (ref 31–36)
MCV RBC AUTO: 94.2 FL (ref 80–100)
PDW BLD-RTO: 14.5 % (ref 12.4–15.4)
PERFORMED ON: ABNORMAL
PLATELET # BLD: 132 K/UL (ref 135–450)
PMV BLD AUTO: 10.6 FL (ref 5–10.5)
POTASSIUM SERPL-SCNC: 3.8 MMOL/L (ref 3.5–5.1)
PROCALCITONIN: 13.53 NG/ML (ref 0–0.15)
PROTHROMBIN TIME: 12.8 SEC (ref 10–13.2)
RBC # BLD: 4.27 M/UL (ref 4.2–5.9)
SODIUM BLD-SCNC: 139 MMOL/L (ref 136–145)
WBC # BLD: 7.9 K/UL (ref 4–11)

## 2020-03-29 PROCEDURE — 6370000000 HC RX 637 (ALT 250 FOR IP): Performed by: INTERNAL MEDICINE

## 2020-03-29 PROCEDURE — 2500000003 HC RX 250 WO HCPCS: Performed by: INTERNAL MEDICINE

## 2020-03-29 PROCEDURE — 2580000003 HC RX 258: Performed by: NURSE PRACTITIONER

## 2020-03-29 PROCEDURE — 99233 SBSQ HOSP IP/OBS HIGH 50: CPT | Performed by: SURGERY

## 2020-03-29 PROCEDURE — 6370000000 HC RX 637 (ALT 250 FOR IP): Performed by: NURSE PRACTITIONER

## 2020-03-29 PROCEDURE — 6360000002 HC RX W HCPCS: Performed by: NURSE PRACTITIONER

## 2020-03-29 PROCEDURE — 36415 COLL VENOUS BLD VENIPUNCTURE: CPT

## 2020-03-29 PROCEDURE — 85610 PROTHROMBIN TIME: CPT

## 2020-03-29 PROCEDURE — 94760 N-INVAS EAR/PLS OXIMETRY 1: CPT

## 2020-03-29 PROCEDURE — 1200000000 HC SEMI PRIVATE

## 2020-03-29 PROCEDURE — 80048 BASIC METABOLIC PNL TOTAL CA: CPT

## 2020-03-29 PROCEDURE — 6360000002 HC RX W HCPCS: Performed by: FAMILY MEDICINE

## 2020-03-29 PROCEDURE — 2580000003 HC RX 258: Performed by: FAMILY MEDICINE

## 2020-03-29 PROCEDURE — 6370000000 HC RX 637 (ALT 250 FOR IP): Performed by: FAMILY MEDICINE

## 2020-03-29 PROCEDURE — 85027 COMPLETE CBC AUTOMATED: CPT

## 2020-03-29 RX ORDER — DOCUSATE SODIUM 100 MG/1
100 CAPSULE, LIQUID FILLED ORAL 2 TIMES DAILY
Status: DISCONTINUED | OUTPATIENT
Start: 2020-03-29 | End: 2020-03-30 | Stop reason: HOSPADM

## 2020-03-29 RX ORDER — OXYCODONE HYDROCHLORIDE AND ACETAMINOPHEN 5; 325 MG/1; MG/1
2 TABLET ORAL EVERY 4 HOURS PRN
Status: DISCONTINUED | OUTPATIENT
Start: 2020-03-29 | End: 2020-03-30 | Stop reason: HOSPADM

## 2020-03-29 RX ORDER — INSULIN GLARGINE 100 [IU]/ML
37 INJECTION, SOLUTION SUBCUTANEOUS NIGHTLY
Status: DISCONTINUED | OUTPATIENT
Start: 2020-03-29 | End: 2020-03-30 | Stop reason: HOSPADM

## 2020-03-29 RX ADMIN — OXYCODONE HYDROCHLORIDE AND ACETAMINOPHEN 2 TABLET: 5; 325 TABLET ORAL at 15:21

## 2020-03-29 RX ADMIN — INSULIN LISPRO 1 UNITS: 100 INJECTION, SOLUTION INTRAVENOUS; SUBCUTANEOUS at 21:40

## 2020-03-29 RX ADMIN — INSULIN LISPRO 2 UNITS: 100 INJECTION, SOLUTION INTRAVENOUS; SUBCUTANEOUS at 13:30

## 2020-03-29 RX ADMIN — INSULIN LISPRO 2 UNITS: 100 INJECTION, SOLUTION INTRAVENOUS; SUBCUTANEOUS at 09:49

## 2020-03-29 RX ADMIN — Medication 1500 MG: at 01:08

## 2020-03-29 RX ADMIN — GABAPENTIN 600 MG: 300 CAPSULE ORAL at 09:48

## 2020-03-29 RX ADMIN — INSULIN LISPRO 4 UNITS: 100 INJECTION, SOLUTION INTRAVENOUS; SUBCUTANEOUS at 17:19

## 2020-03-29 RX ADMIN — ATENOLOL 25 MG: 25 TABLET ORAL at 09:48

## 2020-03-29 RX ADMIN — CLINDAMYCIN PHOSPHATE 600 MG: 600 INJECTION, SOLUTION INTRAVENOUS at 06:15

## 2020-03-29 RX ADMIN — GABAPENTIN 600 MG: 300 CAPSULE ORAL at 21:36

## 2020-03-29 RX ADMIN — SODIUM CHLORIDE: 9 INJECTION, SOLUTION INTRAVENOUS at 21:40

## 2020-03-29 RX ADMIN — ATORVASTATIN CALCIUM 20 MG: 20 TABLET, FILM COATED ORAL at 21:36

## 2020-03-29 RX ADMIN — BACLOFEN 10 MG: 10 TABLET ORAL at 13:29

## 2020-03-29 RX ADMIN — BACLOFEN 10 MG: 10 TABLET ORAL at 09:48

## 2020-03-29 RX ADMIN — SODIUM CHLORIDE: 9 INJECTION, SOLUTION INTRAVENOUS at 04:20

## 2020-03-29 RX ADMIN — TRAZODONE HYDROCHLORIDE 12.5 MG: 50 TABLET ORAL at 21:36

## 2020-03-29 RX ADMIN — INSULIN GLARGINE 37 UNITS: 100 INJECTION, SOLUTION SUBCUTANEOUS at 21:40

## 2020-03-29 RX ADMIN — DOCUSATE SODIUM 100 MG: 100 CAPSULE, LIQUID FILLED ORAL at 13:29

## 2020-03-29 RX ADMIN — GABAPENTIN 600 MG: 300 CAPSULE ORAL at 13:29

## 2020-03-29 RX ADMIN — ENOXAPARIN SODIUM 100 MG: 100 INJECTION SUBCUTANEOUS at 09:47

## 2020-03-29 RX ADMIN — CLINDAMYCIN PHOSPHATE 600 MG: 600 INJECTION, SOLUTION INTRAVENOUS at 15:21

## 2020-03-29 RX ADMIN — FAMOTIDINE 20 MG: 20 TABLET, FILM COATED ORAL at 17:19

## 2020-03-29 RX ADMIN — OXYCODONE HYDROCHLORIDE AND ACETAMINOPHEN 1 TABLET: 5; 325 TABLET ORAL at 09:48

## 2020-03-29 RX ADMIN — OXYBUTYNIN CHLORIDE 5 MG: 5 TABLET, EXTENDED RELEASE ORAL at 09:48

## 2020-03-29 RX ADMIN — MULTIPLE VITAMINS W/ MINERALS TAB 1 TABLET: TAB at 09:47

## 2020-03-29 RX ADMIN — BACLOFEN 10 MG: 10 TABLET ORAL at 21:36

## 2020-03-29 RX ADMIN — DOCUSATE SODIUM 100 MG: 100 CAPSULE, LIQUID FILLED ORAL at 21:36

## 2020-03-29 RX ADMIN — Medication 1500 MG: at 13:30

## 2020-03-29 RX ADMIN — OXYCODONE HYDROCHLORIDE AND ACETAMINOPHEN 2 TABLET: 5; 325 TABLET ORAL at 21:40

## 2020-03-29 RX ADMIN — CLINDAMYCIN PHOSPHATE 600 MG: 600 INJECTION, SOLUTION INTRAVENOUS at 21:37

## 2020-03-29 ASSESSMENT — PAIN DESCRIPTION - PAIN TYPE
TYPE: ACUTE PAIN

## 2020-03-29 ASSESSMENT — PAIN DESCRIPTION - ORIENTATION
ORIENTATION: RIGHT

## 2020-03-29 ASSESSMENT — PAIN DESCRIPTION - DESCRIPTORS
DESCRIPTORS: DISCOMFORT
DESCRIPTORS: ACHING

## 2020-03-29 ASSESSMENT — PAIN DESCRIPTION - LOCATION
LOCATION: LEG
LOCATION: LEG;OTHER (COMMENT)
LOCATION: LEG
LOCATION: LEG;OTHER (COMMENT)
LOCATION: LEG;OTHER (COMMENT)

## 2020-03-29 ASSESSMENT — PAIN DESCRIPTION - FREQUENCY
FREQUENCY: CONTINUOUS

## 2020-03-29 ASSESSMENT — PAIN SCALES - GENERAL
PAINLEVEL_OUTOF10: 6
PAINLEVEL_OUTOF10: 10
PAINLEVEL_OUTOF10: 10
PAINLEVEL_OUTOF10: 0
PAINLEVEL_OUTOF10: 8
PAINLEVEL_OUTOF10: 10
PAINLEVEL_OUTOF10: 8
PAINLEVEL_OUTOF10: 4

## 2020-03-29 ASSESSMENT — PAIN DESCRIPTION - ONSET
ONSET: ON-GOING

## 2020-03-29 ASSESSMENT — PAIN - FUNCTIONAL ASSESSMENT
PAIN_FUNCTIONAL_ASSESSMENT: ACTIVITIES ARE NOT PREVENTED
PAIN_FUNCTIONAL_ASSESSMENT: PREVENTS OR INTERFERES SOME ACTIVE ACTIVITIES AND ADLS

## 2020-03-29 ASSESSMENT — PAIN DESCRIPTION - PROGRESSION
CLINICAL_PROGRESSION: GRADUALLY IMPROVING
CLINICAL_PROGRESSION: GRADUALLY IMPROVING
CLINICAL_PROGRESSION: NOT CHANGED
CLINICAL_PROGRESSION: GRADUALLY IMPROVING

## 2020-03-29 NOTE — PROGRESS NOTES
Infectious Disease Follow up Notes  Admit Date: 3/26/2020  Hospital Day: 5    Antibiotics :   IV Vancomycin  IV Clindamycin     CHIEF COMPLAINT:     Rt leg cellulitis  Rt leg swelling and   Fevers  Sepsis     Subjective interval History :  48 y.o. man with paraplegia from [facility] and has sacral ulcer and previous Rt thigh surgery admitted with high fevers from NH and Rt leg swelling local redness and Rt groin pain and chills with emesis and WBC elevation with lactic acidosis. Given the on going sepsis and complicated infection we are consulted. He does have several abx allergies.     Rt leg cellulitis some improvement tolerating ace wraps  Fevers going down no chills and pain still an issue d/w Primary team     Past Medical History:    Past Medical History:   Diagnosis Date    Chronic embolism and thrombosis of unspecified deep veins of right lower extremity (HCC)     Chronic pain     Essential hypertension     Osteomyelitis of vertebra (HCC)     Paraplegia ([facility])        Past Surgical History:    Rt thigh surgery       Current Medications:    Outpatient Medications Marked as Taking for the 3/26/20 encounter [facility] HOSPITAL Encounter)   Medication Sig Dispense Refill    albuterol (PROVENTIL) (2.5 MG/3ML) 0.083% nebulizer solution Take 2.5 mg by nebulization every 4 hours as needed for Wheezing or Shortness of Breath      insulin aspart (NOVOLOG) 100 UNIT/ML injection vial Inject 10 Units into the skin 3 times daily (before meals)      insulin glargine (LANTUS) 100 UNIT/ML injection vial Inject 35 Units into the skin nightly      guaiFENesin (MUCINEX) 600 MG extended release tablet Take 600 mg by mouth 2 times daily as needed for Congestion       vitamin D (ERGOCALCIFEROL) 1.25 MG (65189 UT) CAPS capsule Take 50,000 Units by mouth every 30 days On the 18th      rivaroxaban (XARELTO) 20 MG TABS tablet Take 20 mg by mouth Daily with supper Data Review:    Lab Results   Component Value Date    WBC 4.6 03/30/2020    HGB 13.0 (L) 03/30/2020    HCT 38.0 (L) 03/30/2020    MCV 93.3 03/30/2020     03/30/2020     Lab Results   Component Value Date    CREATININE <0.5 (L) 03/30/2020    BUN 6 (L) 03/30/2020     03/30/2020    K 3.6 03/30/2020     03/30/2020    CO2 24 03/30/2020       Hepatic Function Panel:   Lab Results   Component Value Date    ALKPHOS 59 03/27/2020    ALT 48 03/27/2020    AST 50 03/27/2020    PROT 6.3 03/27/2020    PROT 7.5 04/29/2011    BILITOT 0.7 03/27/2020    BILIDIR 0.3 03/26/2020    IBILI 0.4 03/26/2020    LABALBU 3.0 03/27/2020       UA:  Lab Results   Component Value Date    COLORU DK YELLOW 03/26/2020    CLARITYU Clear 03/26/2020    GLUCOSEU 250 03/26/2020    BILIRUBINUR SMALL 03/26/2020    KETUA Negative 03/26/2020    SPECGRAV 1.024 03/26/2020    BLOODU Negative 03/26/2020    PHUR 5.5 03/26/2020    PROTEINU TRACE 03/26/2020    UROBILINOGEN 1.0 03/26/2020    NITRU Negative 03/26/2020    LEUKOCYTESUR SMALL 03/26/2020    LABMICR YES 03/26/2020    URINETYPE NotGiven 03/26/2020      Urine Microscopic:   Lab Results   Component Value Date    HYALCAST 19 03/26/2020    WBCUA 36 03/26/2020    RBCUA 1 03/26/2020    EPIU 5 03/26/2020   Lactic acid  3.7      Procal  17.8      WBC  21.8      Esr 15     MICRO: cultures reviewed and updated by me   2038       Order Status: Completed Specimen: Urine, clean catch Updated: 03/28/20 0700    Urine Culture, Routine <10,000 CFU/ml mixed skin/urogenital luis miguel. No further workup   Narrative:     ORDER#: [id number]                          ORDERED BY: Erika Swift  SOURCE: Urine Clean Catch                  COLLECTED:  03/26/20 20:38  ANTIBIOTICS AT LEXY. :                      RECEIVED :  03/26/20 21:01  Performed at:  [facility]  [address] [facility] [address] [address]   Phone [phone]   Culture, Blood 1 [[id number]] Collected: 03/26/20 2038 Hepatomegaly with steatosis. Normal appendix. Right inguinal and iliac chain lymphadenopathy with some adjacent fat   stranding, suggesting inflammation and lymphadenitis. This is presumably   reactive although would recommend clinical follow-up. If persistent,   consideration could be given to follow-up inguinal ultrasound. CT CHEST PULMONARY EMBOLISM W CONTRAST   Final Result   No evidence of pulmonary embolism or acute pulmonary abnormality. No aortic dissection or aneurysm. No acute abdominopelvic findings. Cholelithiasis without evidence of cholecystitis. Hepatomegaly with steatosis. Normal appendix. Right inguinal and iliac chain lymphadenopathy with some adjacent fat   stranding, suggesting inflammation and lymphadenitis. This is presumably   reactive although would recommend clinical follow-up. If persistent,   consideration could be given to follow-up inguinal ultrasound. XR CHEST PORTABLE   Final Result   No acute cardiopulmonary pathology.                All the pertinent images and reports for the current Hospitalization were reviewed by me     Scheduled Meds:   insulin glargine  37 Units Subcutaneous Nightly    docusate sodium  100 mg Oral BID    rivaroxaban  20 mg Oral Daily    atorvastatin  20 mg Oral Nightly    baclofen  10 mg Oral TID    famotidine  20 mg Oral QPM    gabapentin  600 mg Oral TID    therapeutic multivitamin-minerals  1 tablet Oral Daily    oxybutynin  5 mg Oral Daily    traZODone  12.5 mg Oral Nightly    insulin lispro  0-12 Units Subcutaneous TID WC    insulin lispro  0-6 Units Subcutaneous Nightly    sodium chloride flush  10 mL Intravenous 2 times per day    vancomycin (VANCOCIN) intermittent dosing (placeholder)   Other RX Placeholder    vancomycin  1,500 mg Intravenous Q12H    atenolol  25 mg Oral Daily    clindamycin (CLEOCIN) IV  600 mg Intravenous Q8H       Continuous Infusions:   sodium chloride 75
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
## 2020-03-29 NOTE — PLAN OF CARE
Problem: Pain:  Goal: Pain level will decrease  Description: Pain level will decrease  3/28/2020 1653 by Shey Chin RN  Outcome: Ongoing  Note: Pt able to express presence and absence of pain using numerical pain scale. Pt pain is managed by PRN analgesics as ordered by MD. Pain reassess after each interventions. Will continue to monitor as needed. Problem: Pain:  Goal: Control of acute pain  Description: Control of acute pain  3/28/2020 1653 by Shey Chin RN  Outcome: Ongoing     Problem: Pain:  Goal: Control of chronic pain  Description: Control of chronic pain  3/28/2020 1653 by Shey Chin RN  Outcome: Ongoing     Problem: Pain:  Goal: Patient's pain/discomfort is manageable  Description: Patient's pain/discomfort is manageable  3/28/2020 1653 by Shey Chin RN  Outcome: Ongoing     Problem: Skin Integrity:  Goal: Will show no infection signs and symptoms  Description: Will show no infection signs and symptoms  3/28/2020 1653 by Shey Chin RN  Outcome: Ongoing  Note: Skin assessment performed each shift per protocol. Pt offered for repositioning every two hours. Pt refused. Pt states he can repositioned himself. Special air mattress in place. Will continue to monitor and assess for skin breakdown. Problem: Physical Regulation:  Goal: Will remain free from infection  Description: Will remain free from infection  3/28/2020 1653 by Shey Chin RN  Outcome: Ongoing     Problem: Serum Glucose Level - Abnormal:  Goal: Ability to maintain appropriate glucose levels has stabilized  Description: Ability to maintain appropriate glucose levels has stabilized  Outcome: Ongoing     Problem: Falls - Risk of:  Goal: Will remain free from falls  Description: Will remain free from falls  3/28/2020 1653 by Shey Chin RN  Outcome: Ongoing  Note: Pt free from falls this shift. Non skin socks on. Bed alarm used. Call light always within reach.  Pt able and agreeable to
Problem: Pain:  Goal: Pain level will decrease  Description: Pain level will decrease  3/29/2020 1106 by Emily Prado RN  Outcome: Ongoing     Problem: Pain:  Goal: Control of acute pain  Description: Control of acute pain  3/29/2020 1106 by Emily Prado RN  Outcome: Ongoing  Note: Pt able to express presence and absence of pain using numerical pain scale. Pt pain is managed by PRN analgesics as ordered by MD. Pain reassess after each interventions. Will continue to monitor as needed. Problem:   Goal: Adequate urinary output  3/29/2020 1106 by Emily Prado RN  Outcome: Ongoing     Problem: Skin Integrity/Risk  Goal: No skin breakdown during hospitalization  3/29/2020 1106 by Emily Prado RN  Outcome: Ongoing  Note: Skin assessment performed each shift per protocol. Pt encouraged to reposition every two hours. Pt states he is able to reposition self in bed and refuses assistance. pt has a stage 2 on coccyx-mepilex border applied. Special air mattress in bed. Will continue to monitor and assess for skin breakdown. Problem: Falls - Risk of:  Goal: Will remain free from falls  Description: Will remain free from falls  Outcome: Ongoing  Note: Pt free from falls this shift. Non skid socks on. Bed alarm activated. Call light always within reach. Pt able and agreeable to contact for safety appropriately.
Problem: Pain:  Goal: Pain level will decrease  Description: Pain level will decrease  Outcome: Ongoing     Problem:   Goal: Adequate urinary output  Outcome: Ongoing     Problem: Nutrition  Goal: Optimal nutrition therapy  Outcome: Ongoing     Problem: Skin Integrity/Risk  Goal: Wound healing  Outcome: Ongoing
Ongoing     Problem: Respiratory:  Goal: Ability to maintain normal respiratory secretions will improve  Description: Ability to maintain normal respiratory secretions will improve  Outcome: Ongoing     Problem: Serum Glucose Level - Abnormal:  Goal: Ability to maintain appropriate glucose levels has stabilized  Description: Ability to maintain appropriate glucose levels has stabilized  Outcome: Ongoing

## 2020-03-30 VITALS
OXYGEN SATURATION: 95 % | RESPIRATION RATE: 16 BRPM | SYSTOLIC BLOOD PRESSURE: 143 MMHG | DIASTOLIC BLOOD PRESSURE: 81 MMHG | WEIGHT: 243.61 LBS | HEIGHT: 68 IN | HEART RATE: 69 BPM | BODY MASS INDEX: 36.92 KG/M2 | TEMPERATURE: 98 F

## 2020-03-30 LAB
ANION GAP SERPL CALCULATED.3IONS-SCNC: 13 MMOL/L (ref 3–16)
ANTISTREPTOLYSIN-O: 75 IU/ML (ref 0–200)
BLOOD CULTURE, ROUTINE: NORMAL
BLOOD CULTURE, ROUTINE: NORMAL
BUN BLDV-MCNC: 6 MG/DL (ref 7–20)
CALCIUM SERPL-MCNC: 8.2 MG/DL (ref 8.3–10.6)
CHLORIDE BLD-SCNC: 103 MMOL/L (ref 99–110)
CO2: 24 MMOL/L (ref 21–32)
CREAT SERPL-MCNC: <0.5 MG/DL (ref 0.9–1.3)
GFR AFRICAN AMERICAN: >60
GFR NON-AFRICAN AMERICAN: >60
GLUCOSE BLD-MCNC: 123 MG/DL (ref 70–99)
GLUCOSE BLD-MCNC: 146 MG/DL (ref 70–99)
GLUCOSE BLD-MCNC: 170 MG/DL (ref 70–99)
GLUCOSE BLD-MCNC: 174 MG/DL (ref 70–99)
HCT VFR BLD CALC: 38 % (ref 40.5–52.5)
HEMOGLOBIN: 13 G/DL (ref 13.5–17.5)
INR BLD: 1 (ref 0.86–1.14)
MCH RBC QN AUTO: 31.9 PG (ref 26–34)
MCHC RBC AUTO-ENTMCNC: 34.2 G/DL (ref 31–36)
MCV RBC AUTO: 93.3 FL (ref 80–100)
PDW BLD-RTO: 14.1 % (ref 12.4–15.4)
PERFORMED ON: ABNORMAL
PLATELET # BLD: 142 K/UL (ref 135–450)
PMV BLD AUTO: 11.2 FL (ref 5–10.5)
POTASSIUM SERPL-SCNC: 3.6 MMOL/L (ref 3.5–5.1)
PROTHROMBIN TIME: 11.6 SEC (ref 10–13.2)
RBC # BLD: 4.08 M/UL (ref 4.2–5.9)
SODIUM BLD-SCNC: 140 MMOL/L (ref 136–145)
STREPTOZYME: NEGATIVE
VANCOMYCIN TROUGH: 11.1 UG/ML (ref 10–20)
WBC # BLD: 4.6 K/UL (ref 4–11)

## 2020-03-30 PROCEDURE — 2580000003 HC RX 258: Performed by: NURSE PRACTITIONER

## 2020-03-30 PROCEDURE — APPSS30 APP SPLIT SHARED TIME 16-30 MINUTES: Performed by: NURSE PRACTITIONER

## 2020-03-30 PROCEDURE — 6370000000 HC RX 637 (ALT 250 FOR IP): Performed by: NURSE PRACTITIONER

## 2020-03-30 PROCEDURE — 6360000002 HC RX W HCPCS: Performed by: NURSE PRACTITIONER

## 2020-03-30 PROCEDURE — 94760 N-INVAS EAR/PLS OXIMETRY 1: CPT

## 2020-03-30 PROCEDURE — 36415 COLL VENOUS BLD VENIPUNCTURE: CPT

## 2020-03-30 PROCEDURE — 85027 COMPLETE CBC AUTOMATED: CPT

## 2020-03-30 PROCEDURE — APPNB30 APP NON BILLABLE TIME 0-30 MINS: Performed by: NURSE PRACTITIONER

## 2020-03-30 PROCEDURE — 80048 BASIC METABOLIC PNL TOTAL CA: CPT

## 2020-03-30 PROCEDURE — 85610 PROTHROMBIN TIME: CPT

## 2020-03-30 PROCEDURE — 2500000003 HC RX 250 WO HCPCS: Performed by: INTERNAL MEDICINE

## 2020-03-30 PROCEDURE — 99232 SBSQ HOSP IP/OBS MODERATE 35: CPT | Performed by: SURGERY

## 2020-03-30 PROCEDURE — 99232 SBSQ HOSP IP/OBS MODERATE 35: CPT | Performed by: INTERNAL MEDICINE

## 2020-03-30 PROCEDURE — 6370000000 HC RX 637 (ALT 250 FOR IP): Performed by: INTERNAL MEDICINE

## 2020-03-30 PROCEDURE — 6370000000 HC RX 637 (ALT 250 FOR IP): Performed by: FAMILY MEDICINE

## 2020-03-30 PROCEDURE — 80202 ASSAY OF VANCOMYCIN: CPT

## 2020-03-30 RX ORDER — LACTOBACILLUS RHAMNOSUS GG 10B CELL
1 CAPSULE ORAL
Qty: 30 CAPSULE | Refills: 1 | Status: SHIPPED | OUTPATIENT
Start: 2020-03-30 | End: 2020-04-29

## 2020-03-30 RX ORDER — INSULIN GLARGINE 100 [IU]/ML
37 INJECTION, SOLUTION SUBCUTANEOUS NIGHTLY
Qty: 1 VIAL | Refills: 3 | Status: SHIPPED | OUTPATIENT
Start: 2020-03-30

## 2020-03-30 RX ORDER — LOSARTAN POTASSIUM 25 MG/1
25 TABLET ORAL DAILY
Status: DISCONTINUED | OUTPATIENT
Start: 2020-03-30 | End: 2020-03-30 | Stop reason: HOSPADM

## 2020-03-30 RX ORDER — SULFAMETHOXAZOLE AND TRIMETHOPRIM 800; 160 MG/1; MG/1
1 TABLET ORAL 2 TIMES DAILY
Qty: 14 TABLET | Refills: 0 | Status: SHIPPED | OUTPATIENT
Start: 2020-03-30 | End: 2020-04-06

## 2020-03-30 RX ORDER — PSEUDOEPHEDRINE HCL 30 MG
100 TABLET ORAL 2 TIMES DAILY
Qty: 60 CAPSULE | Refills: 1 | Status: SHIPPED | OUTPATIENT
Start: 2020-03-30 | End: 2020-04-29

## 2020-03-30 RX ORDER — LACTOBACILLUS RHAMNOSUS GG 10B CELL
1 CAPSULE ORAL
Status: DISCONTINUED | OUTPATIENT
Start: 2020-03-30 | End: 2020-03-30 | Stop reason: HOSPADM

## 2020-03-30 RX ADMIN — Medication 1500 MG: at 13:19

## 2020-03-30 RX ADMIN — GABAPENTIN 600 MG: 300 CAPSULE ORAL at 13:19

## 2020-03-30 RX ADMIN — CLINDAMYCIN PHOSPHATE 600 MG: 600 INJECTION, SOLUTION INTRAVENOUS at 05:37

## 2020-03-30 RX ADMIN — ATENOLOL 25 MG: 25 TABLET ORAL at 08:33

## 2020-03-30 RX ADMIN — CLINDAMYCIN PHOSPHATE 600 MG: 600 INJECTION, SOLUTION INTRAVENOUS at 16:36

## 2020-03-30 RX ADMIN — GABAPENTIN 600 MG: 300 CAPSULE ORAL at 08:33

## 2020-03-30 RX ADMIN — Medication 1500 MG: at 02:01

## 2020-03-30 RX ADMIN — INSULIN LISPRO 2 UNITS: 100 INJECTION, SOLUTION INTRAVENOUS; SUBCUTANEOUS at 08:29

## 2020-03-30 RX ADMIN — BACLOFEN 10 MG: 10 TABLET ORAL at 13:59

## 2020-03-30 RX ADMIN — BACLOFEN 10 MG: 10 TABLET ORAL at 08:34

## 2020-03-30 RX ADMIN — OXYCODONE HYDROCHLORIDE AND ACETAMINOPHEN 2 TABLET: 5; 325 TABLET ORAL at 13:19

## 2020-03-30 RX ADMIN — ONDANSETRON HYDROCHLORIDE 4 MG: 2 SOLUTION INTRAMUSCULAR; INTRAVENOUS at 08:46

## 2020-03-30 RX ADMIN — Medication 1 CAPSULE: at 15:17

## 2020-03-30 RX ADMIN — DOCUSATE SODIUM 100 MG: 100 CAPSULE, LIQUID FILLED ORAL at 08:33

## 2020-03-30 RX ADMIN — OXYCODONE HYDROCHLORIDE AND ACETAMINOPHEN 2 TABLET: 5; 325 TABLET ORAL at 08:46

## 2020-03-30 RX ADMIN — MULTIPLE VITAMINS W/ MINERALS TAB 1 TABLET: TAB at 08:34

## 2020-03-30 RX ADMIN — LOSARTAN POTASSIUM 25 MG: 25 TABLET ORAL at 15:17

## 2020-03-30 RX ADMIN — OXYBUTYNIN CHLORIDE 5 MG: 5 TABLET, EXTENDED RELEASE ORAL at 08:33

## 2020-03-30 ASSESSMENT — PAIN SCALES - GENERAL
PAINLEVEL_OUTOF10: 10
PAINLEVEL_OUTOF10: 7
PAINLEVEL_OUTOF10: 0
PAINLEVEL_OUTOF10: 10
PAINLEVEL_OUTOF10: 0

## 2020-03-30 ASSESSMENT — PAIN DESCRIPTION - DESCRIPTORS: DESCRIPTORS: ACHING

## 2020-03-30 ASSESSMENT — PAIN DESCRIPTION - ORIENTATION: ORIENTATION: RIGHT;UPPER

## 2020-03-30 ASSESSMENT — PAIN DESCRIPTION - LOCATION: LOCATION: LEG

## 2020-03-30 ASSESSMENT — PAIN DESCRIPTION - PROGRESSION: CLINICAL_PROGRESSION: NOT CHANGED

## 2020-03-30 ASSESSMENT — PAIN DESCRIPTION - FREQUENCY: FREQUENCY: CONTINUOUS

## 2020-03-30 ASSESSMENT — PAIN DESCRIPTION - ONSET: ONSET: ON-GOING

## 2020-03-30 ASSESSMENT — PAIN DESCRIPTION - PAIN TYPE: TYPE: ACUTE PAIN

## 2020-03-30 ASSESSMENT — PAIN - FUNCTIONAL ASSESSMENT: PAIN_FUNCTIONAL_ASSESSMENT: PREVENTS OR INTERFERES SOME ACTIVE ACTIVITIES AND ADLS

## 2020-03-30 NOTE — DISCHARGE INSTR - COC
with Video (Video Swallowing Test): not done    Treatments at the Time of Hospital Discharge:   Respiratory Treatments:   Oxygen Therapy:  is not on home oxygen therapy. Ventilator:    - No ventilator support    Rehab Therapies: Physical Therapy and Occupational Therapy  Weight Bearing Status/Restrictions: No weight bearing restirctions  Other Medical Equipment (for information only, NOT a DME order):  wheelchair  Other Treatments:     Patient's personal belongings (please select all that are sent with patient):  None    RN SIGNATURE:  Electronically signed by [id number] on 3/30/20 at 4:59 PM EDT    CASE MANAGEMENT/SOCIAL WORK SECTION    Inpatient Status Date: 03/26/2020    Readmission Risk Assessment Score:      Discharging to Facility/ Agency   · Name: [facility]   · Address:  · Phone:[phone]  · NAY:[phone]      / signature: Electronically signed by Remedios Molina RN on 3/30/20 at 3:14 PM EDT    PHYSICIAN SECTION    Prognosis: Good    Condition at Discharge: Stable    Rehab Potential (if transferring to Rehab): Fair    Recommended Labs or Other Treatments After Discharge:     CBC and BMP in 2 days    Physician Certification: I certify the above information and transfer of Orlando Sheffield  is necessary for the continuing treatment of the diagnosis listed and that he requires [facility] for greater 30 days.      Update Admission H&P: No change in H&P    PHYSICIAN SIGNATURE:  Electronically signed by Keesha Lemons MD on 3/30/20 at 3:13 PM EDT
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
## 2020-03-30 NOTE — DISCHARGE SUMMARY
discharged back to ECF.       Consultants:   PHARMACY TO DOSE VANCOMYCIN  IP CONSULT TO INFECTIOUS DISEASES  IP CONSULT TO GENERAL SURGERY  IP CONSULT TO VASCULAR SURGERY    Time Spent on Discharge:  35 minutes were spent in patient examination, evaluation, counseling as well as medication reconciliation, prescriptions for required medications, discharge plan and follow up. Surgeries/Procedures Performed:    None    Treatments:   antibiotics: vancomycin and clindamycine    Significant Diagnostic Studies:   Recent Labs:  CBC:   Lab Results   Component Value Date    WBC 4.6 03/30/2020    RBC 4.08 03/30/2020    HGB 13.0 03/30/2020    HCT 38.0 03/30/2020    MCV 93.3 03/30/2020    MCH 31.9 03/30/2020    MCHC 34.2 03/30/2020    RDW 14.1 03/30/2020     03/30/2020       Radiology Last 7 Days:  Xr Foot Right (min 3 Views)    Result Date: 3/27/2020  Marked soft tissue swelling. No soft tissue gas or radiopaque foreign body. No radiographic evidence of osteomyelitis is identified at this time. Ct Abdomen Pelvis W Iv Contrast Additional Contrast? None    Result Date: 3/26/2020  No evidence of pulmonary embolism or acute pulmonary abnormality. No aortic dissection or aneurysm. No acute abdominopelvic findings. Cholelithiasis without evidence of cholecystitis. Hepatomegaly with steatosis. Normal appendix. Right inguinal and iliac chain lymphadenopathy with some adjacent fat stranding, suggesting inflammation and lymphadenitis. This is presumably reactive although would recommend clinical follow-up. If persistent, consideration could be given to follow-up inguinal ultrasound. Xr Chest Portable    Result Date: 3/26/2020  No acute cardiopulmonary pathology. Ct Chest Pulmonary Embolism W Contrast    Result Date: 3/26/2020  No evidence of pulmonary embolism or acute pulmonary abnormality. No aortic dissection or aneurysm. No acute abdominopelvic findings. Cholelithiasis without evidence of cholecystitis. capsule  Commonly known as:  IMODIUM     losartan 25 MG tablet  Commonly known as:  COZAAR     NovoLOG 100 UNIT/ML injection vial  Generic drug:  insulin aspart     ondansetron 4 MG tablet  Commonly known as:  ZOFRAN     oxybutynin 5 MG extended release tablet  Commonly known as:  DITROPAN-XL     oxyCODONE-acetaminophen 5-325 MG per tablet  Commonly known as:  PERCOCET     therapeutic multivitamin-minerals tablet     traZODone 50 MG tablet  Commonly known as:  DESYREL     vitamin D 1.25 MG (38226 UT) Caps capsule  Commonly known as:  ERGOCALCIFEROL     Xarelto 20 MG Tabs tablet  Generic drug:  rivaroxaban        STOP taking these medications    furosemide 20 MG tablet  Commonly known as:  LASIX        ASK your doctor about these medications    melatonin 3 MG Tabs tablet           Where to Get Your Medications      These medications were sent to [address]  [facility] , [facility] [address]    Phone:  [phone]   · docusate 100 MG Caps  · insulin glargine 100 UNIT/ML injection vial  · lactobacillus capsule  · sulfamethoxazole-trimethoprim 800-160 MG per tablet         Electronically signed by Austen Christiansen MD on 3/30/20 at 2:58 PM EDT

## 2020-03-30 NOTE — PROGRESS NOTES
[facility]        PATIENT NAME: Adwoa Emmanuel     TODAY'S DATE: 3/30/2020    CHIEF COMPLAINT:  nausea    SUBJECTIVE:    NO acute events overnight    Pt resting in bed, leg pain and swelling better, some nausea, denies fever or chills      REVIEW OF SYSTEMS:  CONSTITUTIONAL:  negative  HEENT:  negative  RESPIRATORY:  negative  CARDIOVASCULAR:  negative  GASTROINTESTINAL:  negative except nausea  GENITOURINARY:  negative  HEMATOLOGIC/LYMPHATIC:  negative  NEUROLOGICAL:  negative  SKIN: negative except negative except RLE  pain; sarcal wound, chronic       OBJECTIVE:  VITALS:  BP (!) 168/89   Pulse 71   Temp 97.9 °F (36.6 °C) (Oral)   Resp 16   Ht 5' 8\" (1.727 m)   Wt 243 lb 9.7 oz (110.5 kg) Comment: the air mattress pump on bed  SpO2 95% Comment: Simultaneous filing. User may not have seen previous data. BMI 37.04 kg/m²     INTAKE/OUTPUT:    I/O last 3 completed shifts: In: 1903 [P.O.:1080; I.V.:573; IV Piggyback:250]  Out: 800 [Urine:800]  No intake/output data recorded.     Drain/tube Output:           CONSTITUTIONAL:  awake and alert  EYES:  sclera clear  ENT:  normocepalic, without obvious abnormality  NECK:  supple, symmetrical, trachea midline  RESP: no increased WOB  ABD: -  Soft, NT  VASCULAR:  Unable to palp, pedal edema, , + doppler  pedal pulse right dorsalis pedis and posterior tibial are monophasic, leg redness resolved, palpable and less\ tender lymph nodes in the groin  SKIN:  RLE no wounds, small sacral wound   WOUND:  Sacral wound, small, appears non infections from images reviewed  Extremities:  Paraplegic, very slight movement despite his paraplegia he has some sensation especially in the thighs    Data:  CBC:   Recent Labs     03/28/20  0554 03/29/20  0603 03/30/20  0437   WBC 11.6* 7.9 4.6   HGB 13.8 13.7 13.0*   HCT 41.5 40.2* 38.0*    132* 142     BMP:    Recent Labs     03/28/20  0554 03/29/20  0602 03/30/20  0437   * 139 140   K 3.7

## 2020-03-30 NOTE — CARE COORDINATION
SOCIAL WORK DISCHARGE SUMMARY:      DISCHARGE DATE:                 03/30/2020      DISCHARGE PLACE:             [facility]               REPORT NUMBER:       [phone]               FAX NUMBER:                [phone] ( per Warden Rodriguez they will get records from [address] [place] [address])          TRANSPORTATION:                [address] [place] [address] West:                              5:30pm        INSURANCE PRECERT OBTAINED:     n/a    KIANA/CAITLIN COMPLETED:                 N/a , returning to [facility]    Electronically signed by Dariela Guzman RN on 3/30/2020 at 3:11 PM

## 2020-03-31 LAB
GRAM STAIN RESULT: ABNORMAL
ORGANISM: ABNORMAL
WOUND/ABSCESS: ABNORMAL
WOUND/ABSCESS: ABNORMAL

## 2020-05-20 ENCOUNTER — PATIENT MESSAGE (OUTPATIENT)
Dept: ADMINISTRATIVE | Facility: HOSPITAL | Age: 55
End: 2020-05-20

## 2020-06-11 DIAGNOSIS — Z12.11 COLON CANCER SCREENING: ICD-10-CM

## 2020-09-08 ENCOUNTER — HOSPITAL ENCOUNTER (OUTPATIENT)
Dept: NEUROLOGY | Age: 55
Discharge: HOME | End: 2020-09-08
Payer: MEDICAID

## 2020-09-08 PROCEDURE — 95886 MUSC TEST DONE W/N TEST COMP: CPT

## 2020-09-08 PROCEDURE — 95908 NRV CNDJ TST 3-4 STUDIES: CPT

## 2020-10-05 ENCOUNTER — PATIENT MESSAGE (OUTPATIENT)
Dept: ADMINISTRATIVE | Facility: HOSPITAL | Age: 55
End: 2020-10-05

## 2020-12-02 ENCOUNTER — PATIENT MESSAGE (OUTPATIENT)
Dept: ADMINISTRATIVE | Facility: HOSPITAL | Age: 55
End: 2020-12-02

## 2021-01-04 ENCOUNTER — PATIENT MESSAGE (OUTPATIENT)
Dept: ADMINISTRATIVE | Facility: HOSPITAL | Age: 56
End: 2021-01-04

## 2021-04-07 ENCOUNTER — PATIENT MESSAGE (OUTPATIENT)
Dept: ADMINISTRATIVE | Facility: HOSPITAL | Age: 56
End: 2021-04-07

## 2021-04-08 DIAGNOSIS — Z11.59 NEED FOR HEPATITIS C SCREENING TEST: ICD-10-CM

## 2021-06-16 DIAGNOSIS — Z12.11 COLON CANCER SCREENING: ICD-10-CM

## 2021-12-14 NOTE — TELEPHONE ENCOUNTER
I have placed the orders   FAMILY HISTORY:  Family history of coronary artery disease in mother  Family history of hypertension in father  FH: heart disease    Grandparent  Still living? Unknown  Family history of diabetes mellitus in grandmother, Age at diagnosis: Age Unknown

## 2024-05-08 ENCOUNTER — PATIENT MESSAGE (OUTPATIENT)
Dept: ADMINISTRATIVE | Facility: OTHER | Age: 59
End: 2024-05-08
Payer: MEDICARE

## 2024-07-25 ENCOUNTER — HOSPITAL ENCOUNTER (INPATIENT)
Age: 59
LOS: 7 days | Discharge: NURSING, REHAB OR LONG-TERM CARE | End: 2024-08-01
Attending: EMERGENCY MEDICINE | Admitting: INTERNAL MEDICINE
Payer: MEDICAID

## 2024-07-25 ENCOUNTER — APPOINTMENT (OUTPATIENT)
Dept: CT IMAGING | Age: 59
End: 2024-07-25
Payer: MEDICAID

## 2024-07-25 DIAGNOSIS — L03.317 CELLULITIS OF BUTTOCK: ICD-10-CM

## 2024-07-25 DIAGNOSIS — R79.82 ELEVATED C-REACTIVE PROTEIN (CRP): ICD-10-CM

## 2024-07-25 DIAGNOSIS — R07.9 CHEST PAIN, UNSPECIFIED TYPE: ICD-10-CM

## 2024-07-25 DIAGNOSIS — I49.3 VENTRICULAR PREMATURE DEPOLARIZATION: ICD-10-CM

## 2024-07-25 DIAGNOSIS — S31.829A WOUND OF LEFT BUTTOCK, INITIAL ENCOUNTER: Primary | ICD-10-CM

## 2024-07-25 DIAGNOSIS — Z71.89 GOALS OF CARE, COUNSELING/DISCUSSION: ICD-10-CM

## 2024-07-25 DIAGNOSIS — R70.0 ELEVATED ERYTHROCYTE SEDIMENTATION RATE: ICD-10-CM

## 2024-07-25 DIAGNOSIS — I49.1 ATRIAL ECTOPY: ICD-10-CM

## 2024-07-25 DIAGNOSIS — S31.819A BUTTOCK WOUND, RIGHT, INITIAL ENCOUNTER: ICD-10-CM

## 2024-07-25 DIAGNOSIS — Z79.4 TYPE 2 DIABETES MELLITUS WITH OTHER CIRCULATORY COMPLICATION, WITH LONG-TERM CURRENT USE OF INSULIN (HCC): ICD-10-CM

## 2024-07-25 DIAGNOSIS — E11.59 TYPE 2 DIABETES MELLITUS WITH OTHER CIRCULATORY COMPLICATION, WITH LONG-TERM CURRENT USE OF INSULIN (HCC): ICD-10-CM

## 2024-07-25 PROBLEM — L03.90 CELLULITIS: Status: ACTIVE | Noted: 2024-07-25

## 2024-07-25 LAB
ANION GAP SERPL CALCULATED.3IONS-SCNC: 14 MMOL/L (ref 3–16)
BASOPHILS # BLD: 0.2 K/UL (ref 0–0.2)
BASOPHILS NFR BLD: 1.2 %
BUN SERPL-MCNC: 9 MG/DL (ref 7–20)
CALCIUM SERPL-MCNC: 9.2 MG/DL (ref 8.3–10.6)
CHLORIDE SERPL-SCNC: 96 MMOL/L (ref 99–110)
CO2 SERPL-SCNC: 26 MMOL/L (ref 21–32)
CREAT SERPL-MCNC: 0.6 MG/DL (ref 0.9–1.3)
CRP SERPL-MCNC: 75.8 MG/L (ref 0–5.1)
DEPRECATED RDW RBC AUTO: 16.3 % (ref 12.4–15.4)
EOSINOPHIL # BLD: 0.2 K/UL (ref 0–0.6)
EOSINOPHIL NFR BLD: 1.1 %
ERYTHROCYTE [SEDIMENTATION RATE] IN BLOOD BY WESTERGREN METHOD: 92 MM/HR (ref 0–20)
GFR SERPLBLD CREATININE-BSD FMLA CKD-EPI: >90 ML/MIN/{1.73_M2}
GLUCOSE BLD-MCNC: 158 MG/DL (ref 70–99)
GLUCOSE SERPL-MCNC: 162 MG/DL (ref 70–99)
HCT VFR BLD AUTO: 36.8 % (ref 40.5–52.5)
HGB BLD-MCNC: 11.9 G/DL (ref 13.5–17.5)
LYMPHOCYTES # BLD: 2.4 K/UL (ref 1–5.1)
LYMPHOCYTES NFR BLD: 17.4 %
MCH RBC QN AUTO: 28 PG (ref 26–34)
MCHC RBC AUTO-ENTMCNC: 32.4 G/DL (ref 31–36)
MCV RBC AUTO: 86.4 FL (ref 80–100)
MONOCYTES # BLD: 0.8 K/UL (ref 0–1.3)
MONOCYTES NFR BLD: 5.5 %
NEUTROPHILS # BLD: 10.4 K/UL (ref 1.7–7.7)
NEUTROPHILS NFR BLD: 74.8 %
PERFORMED ON: ABNORMAL
PLATELET # BLD AUTO: 499 K/UL (ref 135–450)
PMV BLD AUTO: 8.3 FL (ref 5–10.5)
POTASSIUM SERPL-SCNC: 4 MMOL/L (ref 3.5–5.1)
RBC # BLD AUTO: 4.26 M/UL (ref 4.2–5.9)
SODIUM SERPL-SCNC: 136 MMOL/L (ref 136–145)
WBC # BLD AUTO: 13.9 K/UL (ref 4–11)

## 2024-07-25 PROCEDURE — 6370000000 HC RX 637 (ALT 250 FOR IP): Performed by: INTERNAL MEDICINE

## 2024-07-25 PROCEDURE — 85025 COMPLETE CBC W/AUTO DIFF WBC: CPT

## 2024-07-25 PROCEDURE — 6360000002 HC RX W HCPCS: Performed by: PHYSICIAN ASSISTANT

## 2024-07-25 PROCEDURE — 2580000003 HC RX 258: Performed by: PHYSICIAN ASSISTANT

## 2024-07-25 PROCEDURE — 80048 BASIC METABOLIC PNL TOTAL CA: CPT

## 2024-07-25 PROCEDURE — 1200000000 HC SEMI PRIVATE

## 2024-07-25 PROCEDURE — 6360000004 HC RX CONTRAST MEDICATION: Performed by: EMERGENCY MEDICINE

## 2024-07-25 PROCEDURE — 86140 C-REACTIVE PROTEIN: CPT

## 2024-07-25 PROCEDURE — 6360000002 HC RX W HCPCS: Performed by: INTERNAL MEDICINE

## 2024-07-25 PROCEDURE — 83036 HEMOGLOBIN GLYCOSYLATED A1C: CPT

## 2024-07-25 PROCEDURE — 99285 EMERGENCY DEPT VISIT HI MDM: CPT

## 2024-07-25 PROCEDURE — 87040 BLOOD CULTURE FOR BACTERIA: CPT

## 2024-07-25 PROCEDURE — 36415 COLL VENOUS BLD VENIPUNCTURE: CPT

## 2024-07-25 PROCEDURE — 72193 CT PELVIS W/DYE: CPT

## 2024-07-25 PROCEDURE — 85652 RBC SED RATE AUTOMATED: CPT

## 2024-07-25 RX ORDER — SODIUM CHLORIDE 9 MG/ML
INJECTION, SOLUTION INTRAVENOUS PRN
Status: DISCONTINUED | OUTPATIENT
Start: 2024-07-25 | End: 2024-08-01 | Stop reason: HOSPADM

## 2024-07-25 RX ORDER — INSULIN GLARGINE 100 [IU]/ML
45 INJECTION, SOLUTION SUBCUTANEOUS NIGHTLY
COMMUNITY

## 2024-07-25 RX ORDER — OXYBUTYNIN CHLORIDE 10 MG/1
10 TABLET, EXTENDED RELEASE ORAL DAILY
Status: DISCONTINUED | OUTPATIENT
Start: 2024-07-26 | End: 2024-08-01 | Stop reason: HOSPADM

## 2024-07-25 RX ORDER — ACETAMINOPHEN 650 MG/1
650 SUPPOSITORY RECTAL EVERY 6 HOURS PRN
Status: DISCONTINUED | OUTPATIENT
Start: 2024-07-25 | End: 2024-08-01 | Stop reason: HOSPADM

## 2024-07-25 RX ORDER — SIMETHICONE 80 MG
80 TABLET,CHEWABLE ORAL EVERY 4 HOURS PRN
COMMUNITY

## 2024-07-25 RX ORDER — ALBUTEROL SULFATE 2.5 MG/3ML
2.5 SOLUTION RESPIRATORY (INHALATION) EVERY 4 HOURS PRN
Status: DISCONTINUED | OUTPATIENT
Start: 2024-07-25 | End: 2024-08-01 | Stop reason: HOSPADM

## 2024-07-25 RX ORDER — SIMETHICONE 80 MG
80 TABLET,CHEWABLE ORAL EVERY 4 HOURS PRN
Status: DISCONTINUED | OUTPATIENT
Start: 2024-07-25 | End: 2024-08-01 | Stop reason: HOSPADM

## 2024-07-25 RX ORDER — CETIRIZINE HYDROCHLORIDE 10 MG/1
10 TABLET ORAL DAILY
COMMUNITY

## 2024-07-25 RX ORDER — GABAPENTIN 400 MG/1
800 CAPSULE ORAL 3 TIMES DAILY
Status: DISCONTINUED | OUTPATIENT
Start: 2024-07-25 | End: 2024-08-01 | Stop reason: HOSPADM

## 2024-07-25 RX ORDER — ASPIRIN 81 MG/1
81 TABLET, CHEWABLE ORAL DAILY
COMMUNITY

## 2024-07-25 RX ORDER — FUROSEMIDE 20 MG/1
30 TABLET ORAL 2 TIMES DAILY
COMMUNITY

## 2024-07-25 RX ORDER — ENOXAPARIN SODIUM 100 MG/ML
40 INJECTION SUBCUTANEOUS DAILY
Status: DISCONTINUED | OUTPATIENT
Start: 2024-07-26 | End: 2024-08-01 | Stop reason: HOSPADM

## 2024-07-25 RX ORDER — INSULIN LISPRO 100 [IU]/ML
12 INJECTION, SOLUTION INTRAVENOUS; SUBCUTANEOUS
Status: DISCONTINUED | OUTPATIENT
Start: 2024-07-26 | End: 2024-07-26

## 2024-07-25 RX ORDER — ALBUTEROL SULFATE 90 UG/1
2 AEROSOL, METERED RESPIRATORY (INHALATION) EVERY 6 HOURS PRN
COMMUNITY

## 2024-07-25 RX ORDER — INSULIN LISPRO 100 [IU]/ML
0-4 INJECTION, SOLUTION INTRAVENOUS; SUBCUTANEOUS
Status: DISCONTINUED | OUTPATIENT
Start: 2024-07-26 | End: 2024-08-01 | Stop reason: HOSPADM

## 2024-07-25 RX ORDER — SODIUM CHLORIDE 0.9 % (FLUSH) 0.9 %
5-40 SYRINGE (ML) INJECTION PRN
Status: DISCONTINUED | OUTPATIENT
Start: 2024-07-25 | End: 2024-08-01 | Stop reason: HOSPADM

## 2024-07-25 RX ORDER — LOSARTAN POTASSIUM 25 MG/1
25 TABLET ORAL DAILY
Status: DISCONTINUED | OUTPATIENT
Start: 2024-07-26 | End: 2024-08-01 | Stop reason: HOSPADM

## 2024-07-25 RX ORDER — POTASSIUM CHLORIDE 20 MEQ/1
40 TABLET, EXTENDED RELEASE ORAL PRN
Status: DISCONTINUED | OUTPATIENT
Start: 2024-07-25 | End: 2024-08-01 | Stop reason: HOSPADM

## 2024-07-25 RX ORDER — DEXTROSE MONOHYDRATE 100 MG/ML
INJECTION, SOLUTION INTRAVENOUS CONTINUOUS PRN
Status: DISCONTINUED | OUTPATIENT
Start: 2024-07-25 | End: 2024-08-01 | Stop reason: HOSPADM

## 2024-07-25 RX ORDER — ONDANSETRON 2 MG/ML
4 INJECTION INTRAMUSCULAR; INTRAVENOUS EVERY 6 HOURS PRN
Status: DISCONTINUED | OUTPATIENT
Start: 2024-07-25 | End: 2024-08-01 | Stop reason: HOSPADM

## 2024-07-25 RX ORDER — ATORVASTATIN CALCIUM 80 MG/1
80 TABLET, FILM COATED ORAL NIGHTLY
Status: DISCONTINUED | OUTPATIENT
Start: 2024-07-25 | End: 2024-08-01 | Stop reason: HOSPADM

## 2024-07-25 RX ORDER — ASCORBIC ACID 500 MG
500 TABLET ORAL DAILY
Status: DISCONTINUED | OUTPATIENT
Start: 2024-07-26 | End: 2024-08-01 | Stop reason: HOSPADM

## 2024-07-25 RX ORDER — SODIUM CHLORIDE 0.9 % (FLUSH) 0.9 %
5-40 SYRINGE (ML) INJECTION EVERY 12 HOURS SCHEDULED
Status: DISCONTINUED | OUTPATIENT
Start: 2024-07-25 | End: 2024-08-01 | Stop reason: HOSPADM

## 2024-07-25 RX ORDER — ATENOLOL 25 MG/1
25 TABLET ORAL DAILY
Status: DISCONTINUED | OUTPATIENT
Start: 2024-07-26 | End: 2024-08-01 | Stop reason: HOSPADM

## 2024-07-25 RX ORDER — METRONIDAZOLE 500 MG/100ML
500 INJECTION, SOLUTION INTRAVENOUS EVERY 8 HOURS
Status: DISCONTINUED | OUTPATIENT
Start: 2024-07-25 | End: 2024-07-30

## 2024-07-25 RX ORDER — TRAZODONE HYDROCHLORIDE 50 MG/1
50 TABLET ORAL NIGHTLY
Status: DISCONTINUED | OUTPATIENT
Start: 2024-07-25 | End: 2024-08-01 | Stop reason: HOSPADM

## 2024-07-25 RX ORDER — INSULIN LISPRO 100 [IU]/ML
0-4 INJECTION, SOLUTION INTRAVENOUS; SUBCUTANEOUS NIGHTLY
Status: DISCONTINUED | OUTPATIENT
Start: 2024-07-25 | End: 2024-08-01 | Stop reason: HOSPADM

## 2024-07-25 RX ORDER — INSULIN GLARGINE 100 [IU]/ML
45 INJECTION, SOLUTION SUBCUTANEOUS NIGHTLY
Status: DISCONTINUED | OUTPATIENT
Start: 2024-07-25 | End: 2024-08-01 | Stop reason: HOSPADM

## 2024-07-25 RX ORDER — POLYETHYLENE GLYCOL 3350 17 G/17G
17 POWDER, FOR SOLUTION ORAL DAILY PRN
Status: DISCONTINUED | OUTPATIENT
Start: 2024-07-25 | End: 2024-08-01 | Stop reason: HOSPADM

## 2024-07-25 RX ORDER — GLUCAGON 1 MG/ML
1 KIT INJECTION PRN
Status: DISCONTINUED | OUTPATIENT
Start: 2024-07-25 | End: 2024-08-01 | Stop reason: HOSPADM

## 2024-07-25 RX ORDER — SPIRONOLACTONE 25 MG/1
25 TABLET ORAL DAILY
Status: DISCONTINUED | OUTPATIENT
Start: 2024-07-26 | End: 2024-08-01 | Stop reason: HOSPADM

## 2024-07-25 RX ORDER — EZETIMIBE 10 MG/1
10 TABLET ORAL DAILY
COMMUNITY

## 2024-07-25 RX ORDER — OXYCODONE HYDROCHLORIDE AND ACETAMINOPHEN 5; 325 MG/1; MG/1
1 TABLET ORAL 3 TIMES DAILY
Status: DISCONTINUED | OUTPATIENT
Start: 2024-07-25 | End: 2024-08-01 | Stop reason: HOSPADM

## 2024-07-25 RX ORDER — BACLOFEN 10 MG/1
10 TABLET ORAL 2 TIMES DAILY
Status: DISCONTINUED | OUTPATIENT
Start: 2024-07-25 | End: 2024-08-01 | Stop reason: HOSPADM

## 2024-07-25 RX ORDER — ASPIRIN 81 MG/1
81 TABLET, CHEWABLE ORAL DAILY
Status: DISCONTINUED | OUTPATIENT
Start: 2024-07-26 | End: 2024-08-01 | Stop reason: HOSPADM

## 2024-07-25 RX ORDER — FUROSEMIDE 20 MG/1
30 TABLET ORAL 2 TIMES DAILY
Status: DISCONTINUED | OUTPATIENT
Start: 2024-07-26 | End: 2024-08-01 | Stop reason: HOSPADM

## 2024-07-25 RX ORDER — GUAIFENESIN 600 MG/1
600 TABLET, EXTENDED RELEASE ORAL 2 TIMES DAILY PRN
Status: DISCONTINUED | OUTPATIENT
Start: 2024-07-25 | End: 2024-08-01 | Stop reason: HOSPADM

## 2024-07-25 RX ORDER — EZETIMIBE 10 MG/1
10 TABLET ORAL DAILY
Status: DISCONTINUED | OUTPATIENT
Start: 2024-07-26 | End: 2024-08-01 | Stop reason: HOSPADM

## 2024-07-25 RX ORDER — MAGNESIUM SULFATE IN WATER 40 MG/ML
2000 INJECTION, SOLUTION INTRAVENOUS PRN
Status: DISCONTINUED | OUTPATIENT
Start: 2024-07-25 | End: 2024-08-01 | Stop reason: HOSPADM

## 2024-07-25 RX ORDER — POTASSIUM CHLORIDE 7.45 MG/ML
10 INJECTION INTRAVENOUS PRN
Status: DISCONTINUED | OUTPATIENT
Start: 2024-07-25 | End: 2024-08-01 | Stop reason: HOSPADM

## 2024-07-25 RX ORDER — ONDANSETRON 4 MG/1
4 TABLET, ORALLY DISINTEGRATING ORAL EVERY 8 HOURS PRN
Status: DISCONTINUED | OUTPATIENT
Start: 2024-07-25 | End: 2024-08-01 | Stop reason: HOSPADM

## 2024-07-25 RX ORDER — CETIRIZINE HYDROCHLORIDE 10 MG/1
10 TABLET ORAL DAILY
Status: DISCONTINUED | OUTPATIENT
Start: 2024-07-26 | End: 2024-08-01 | Stop reason: HOSPADM

## 2024-07-25 RX ORDER — POTASSIUM CHLORIDE 20 MEQ/1
20 TABLET, EXTENDED RELEASE ORAL DAILY
COMMUNITY

## 2024-07-25 RX ORDER — ACETAMINOPHEN 325 MG/1
650 TABLET ORAL EVERY 6 HOURS PRN
Status: DISCONTINUED | OUTPATIENT
Start: 2024-07-25 | End: 2024-08-01 | Stop reason: HOSPADM

## 2024-07-25 RX ORDER — FAMOTIDINE 20 MG/1
20 TABLET, FILM COATED ORAL NIGHTLY
Status: DISCONTINUED | OUTPATIENT
Start: 2024-07-25 | End: 2024-08-01 | Stop reason: HOSPADM

## 2024-07-25 RX ORDER — SPIRONOLACTONE 25 MG/1
25 TABLET ORAL DAILY
COMMUNITY

## 2024-07-25 RX ORDER — ASCORBIC ACID 500 MG
500 TABLET ORAL DAILY
COMMUNITY

## 2024-07-25 RX ADMIN — INSULIN GLARGINE 45 UNITS: 100 INJECTION, SOLUTION SUBCUTANEOUS at 22:32

## 2024-07-25 RX ADMIN — CEFEPIME 2000 MG: 2 INJECTION, POWDER, FOR SOLUTION INTRAVENOUS at 18:25

## 2024-07-25 RX ADMIN — IOPAMIDOL 75 ML: 755 INJECTION, SOLUTION INTRAVENOUS at 16:34

## 2024-07-25 RX ADMIN — FAMOTIDINE 20 MG: 20 TABLET, FILM COATED ORAL at 22:29

## 2024-07-25 RX ADMIN — METRONIDAZOLE 500 MG: 500 INJECTION, SOLUTION INTRAVENOUS at 22:39

## 2024-07-25 RX ADMIN — TRAZODONE HYDROCHLORIDE 50 MG: 50 TABLET ORAL at 22:29

## 2024-07-25 RX ADMIN — ATORVASTATIN CALCIUM 80 MG: 80 TABLET, FILM COATED ORAL at 22:29

## 2024-07-25 RX ADMIN — BACLOFEN 10 MG: 10 TABLET ORAL at 22:29

## 2024-07-25 RX ADMIN — VANCOMYCIN HYDROCHLORIDE 1500 MG: 1.5 INJECTION, POWDER, LYOPHILIZED, FOR SOLUTION INTRAVENOUS at 22:14

## 2024-07-25 RX ADMIN — OXYCODONE HYDROCHLORIDE AND ACETAMINOPHEN 1 TABLET: 5; 325 TABLET ORAL at 22:29

## 2024-07-25 RX ADMIN — GABAPENTIN 800 MG: 400 CAPSULE ORAL at 22:29

## 2024-07-25 ASSESSMENT — PAIN DESCRIPTION - ONSET: ONSET: ON-GOING

## 2024-07-25 ASSESSMENT — PAIN DESCRIPTION - DESCRIPTORS: DESCRIPTORS: STABBING

## 2024-07-25 ASSESSMENT — PAIN DESCRIPTION - PAIN TYPE
TYPE: CHRONIC PAIN
TYPE: CHRONIC PAIN

## 2024-07-25 ASSESSMENT — PAIN DESCRIPTION - LOCATION
LOCATION: BACK
LOCATION: BACK

## 2024-07-25 ASSESSMENT — PAIN - FUNCTIONAL ASSESSMENT: PAIN_FUNCTIONAL_ASSESSMENT: 0-10

## 2024-07-25 ASSESSMENT — PAIN SCALES - GENERAL
PAINLEVEL_OUTOF10: 10
PAINLEVEL_OUTOF10: 10

## 2024-07-25 ASSESSMENT — PAIN DESCRIPTION - FREQUENCY: FREQUENCY: CONTINUOUS

## 2024-07-25 NOTE — H&P
Hospital Medicine History & Physical      PCP: Ulises Hager MD    Date of Admission: 7/25/2024    Date of Service: Pt seen/examined on 07/25/2024 and Admitted to Inpatient with expected LOS greater than two midnights due to medical therapy.     Chief Complaint: Buttock cellulitis      History Of Present Illness:      59 y.o. male who presented to [facility] with buttock cellulitis and wounds patient stated he is having wounds for more than 2 weeks, patient from nursing home bilateral paralysis on his lower extremity, denies fever chills nausea vomiting no chest pain shortness of breath abdominal pain does not smoke does not drink alcohol, in ED found to have erythema surrounding deep ulcers and wounds on both buttocks, being admitted for further management and treatment.    Past Medical History:          Diagnosis Date    Chronic embolism and thrombosis of unspecified deep veins of right lower extremity (HCC)     Chronic pain     Essential hypertension     MRSA (methicillin resistant staph aureus) culture positive 03/28/2020    sacrum    Osteomyelitis of vertebra (HCC)     Paraplegia (HCC)        Past Surgical History:      History reviewed. No pertinent surgical history.    Medications Prior to Admission:      Prior to Admission medications    Medication Sig Start Date End Date Taking? Authorizing Provider   aspirin 81 MG chewable tablet Take 1 tablet by mouth daily   Yes ProviderRon MD   albuterol sulfate HFA (VENTOLIN HFA) 108 (90 Base) MCG/ACT inhaler Inhale 2 puffs into the lungs every 6 hours as needed for Wheezing   Yes ProviderRon MD   cetirizine (ZYRTEC) 10 MG tablet Take 1 tablet by mouth daily   Yes ProviderRon MD   ezetimibe (ZETIA) 10 MG tablet Take 1 tablet by mouth daily   Yes ProviderRon MD   potassium chloride (KLOR-CON M) 20 MEQ extended release tablet Take 1 tablet by mouth daily   Yes ProviderRon MD   albuterol (PROVENTIL) (2.5 MG/3ML)

## 2024-07-25 NOTE — ED PROVIDER NOTES
reaction.  Cefepime ordered [PE]      ED Course User Index  [PE] Gilbert Stallworth PA        Is this patient to be included in the SEP-1 Core Measure due to severe sepsis or septic shock?   No   Exclusion criteria - the patient is NOT to be included for SEP-1 Core Measure due to:  2+ SIRS criteria are not met    CONSULTS: (Who and What was discussed)  PHARMACY TO DOSE VANCOMYCIN  IP CONSULT TO PHARMACY  IP CONSULT TO GENERAL SURGERY  Discussion with Other Profesionals : None    Social Determinants : None    Records Reviewed : None    CC/HPI Summary, DDx, ED Course, and Reassessment: Patient presents to the emergency room from care facility due to worsening sacral wound.    On exam he denies any significant pain associated. His mild to moderate erythema concerning for cellulitis.    ESR and CRP were elevated here today.    White count elevated at 13.9.    BMP with glucose of 162    CT scan pelvis with contrast showing pressure injury and cellulitis to the medial portion of each buttocks overlying the sacrum and coccyx stenting to the superior margin of the gluteal cleft near the perineum there is extension of soft tissue density nearly to the bone overlying the distal sacrum but no defined osteomyelitis. There is no abscess and there is no necrotizing fasciitis suggested on CT scan.    Cefepime and vancomycin ordered for treatment.     Patient may be admitted to the hospital for further evaluation and treatment for the cellulitis.  [employer] sent to hospitalist for admission.  Patient is agreement with admission.      I am the Primary Clinician of Record.    FINAL IMPRESSION      1. Wound of left buttock, initial encounter    2. Buttock wound, right, initial encounter    3. Type 2 diabetes mellitus with other circulatory complication, with long-term current use of insulin (HCC)    4. Cellulitis of buttock    5. Elevated erythrocyte sedimentation rate    6. Elevated C-reactive protein (CRP)    7. Goals of care,  counseling/discussion          DISPOSITION/PLAN     DISPOSITION Admitted 07/25/2024 06:03:29 PM      PATIENT REFERRED TO:  No follow-up provider specified.    DISCHARGE MEDICATIONS:  Current Discharge Medication List          DISCONTINUED MEDICATIONS:  Current Discharge Medication List        STOP taking these medications       rivaroxaban (XARELTO) 20 MG TABS tablet Comments:   Reason for Stopping:         melatonin 3 MG TABS tablet Comments:   Reason for Stopping:         Multiple Vitamins-Minerals (THERAPEUTIC MULTIVITAMIN-MINERALS) tablet Comments:   Reason for Stopping:                      (Please note that portions of this note were completed with a voice recognition program.  Efforts were made to edit the dictations but occasionally words are mis-transcribed.)    JUANITA Zelaya (electronically signed)            Gilbert Stallworth PA  07/25/24 2149

## 2024-07-25 NOTE — CONSULTS
Clinical Pharmacy Note  Vancomycin Consult    Pharmacy consult received for one-time dose of vancomycin in the Emergency Department per JUANITA Limon.    Ht Readings from Last 1 Encounters:   03/26/20 1.727 m (5' 8\")        Wt Readings from Last 1 Encounters:   07/25/24 98.9 kg (218 lb 0.6 oz)         Assessment/Plan:  Vancomycin 1500 mg x 1 in ED.  If vancomycin is to continue on admission and pharmacy is to manage dosing, please re-consult with admission orders.      Roseann Worthy PharmD  7/25/2024 6:30 PM

## 2024-07-25 NOTE — ED PROVIDER NOTES
[facility] EMERGENCY DEPARTMENT  EMERGENCY DEPARTMENT ENCOUNTER      Pt Name: César Saunders  MRN: [id number]  Birthdate 1965  Date of evaluation: 7/25/2024  Provider: Janine Kelley MD  PCP: Feroz Yanes  Note Started: 2:17 PM EDT 7/25/24    ED Attending Attestation Note     CHIEF COMPLAINT       Chief Complaint   Patient presents with    Wound Check     States that he was sent to the ED for a pressure wound to L buttocks.  Pt states that he was sent by his PCP.  Pt states wound has been present for approximately 2 weeks.     EMERGENCY DEPARTMENT COURSE and DIFFERENTIAL DIAGNOSIS/MDM:      This patient was seen by the advance practice provider.  In addition to the RON I personally saw César Saunders and made/approved the management plan. I take responsibility for the patient management.     Briefly, this is a 59 y.o. male who presents to the emergency department from his assisted living.  He reports he was sent here by the nurses due to concern for his wound.  They tell him that it is getting worse.  He states he thinks it is not that bad.  Though he does admit he cannot really see it.  He does deny any nausea vomiting fevers or chills.  He reports he has been eating and drinking well.  He states he has had other bad wounds in the past that have required intervention.  He is not currently on any antibiotics.    On exam he does have a pressure ulcer noted to his buttock.  It is soiled with feces.  Peripheral IV was placed labs were ordered in addition to a CT scan of his abdomen and pelvis          His workup here is concerning for signs of infection with elevated white count, elevated ESR, elevated CRP.  He on CT scan does have what appears to be a pressure injury/cellulitis in the medial portion of each buttock overlying the sacrum and coccyx extending from the superior margin of the gluteal cleft near the perineum.  There is extension of soft tissue density nearly to bone overlying  deep veins of right lower extremity (HCC), Chronic pain, Essential hypertension, MRSA (methicillin resistant staph aureus) culture positive, Osteomyelitis of vertebra (HCC), and Paraplegia (HCC).     Social History     Socioeconomic History    Marital status: Single     Spouse name: None    Number of children: None    Years of education: None    Highest education level: None   Tobacco Use    Smoking status: Never    Smokeless tobacco: Never     History reviewed. No pertinent surgical history.  History reviewed. No pertinent family history.    CONSULTS: (Who and what was discussed)  None     Chronic Conditions: see medical history    Patient was given the following medications:  Orders Placed This Encounter   Medications    iopamidol (ISOVUE-370) 76 % injection 75 mL    ceFEPIme (MAXIPIME) 2,000 mg in sodium chloride 0.9 % 100 mL IVPB (mini-bag)     Order Specific Question:   Antimicrobial Indications     Answer:   Skin and Soft Tissue Infection    DISCONTD: vancomycin 1000 mg IVPB in 250 mL NS addavial     Order Specific Question:   Antimicrobial Indications     Answer:   Skin and Soft Tissue Infection    vancomycin (VANCOCIN) 1,500 mg in sodium chloride 0.9 % 250 mL IVPB (ADDAVIAL)     Order Specific Question:   Antimicrobial Indications     Answer:   Skin and Soft Tissue Infection     Order Specific Question:   Skin duration of therapy     Answer:   Other     Order Specific Question:   Other Skin and Soft Tissue Infection Duration     Answer:   x1       CURRENT MEDICATIONS       Previous Medications    ALBUTEROL (PROVENTIL) (2.5 MG/3ML) 0.083% NEBULIZER SOLUTION    Take 2.5 mg by nebulization every 4 hours as needed for Wheezing or Shortness of Breath    ATENOLOL (TENORMIN) 25 MG TABLET    Take 25 mg by mouth daily    ATORVASTATIN (LIPITOR) 20 MG TABLET    Take 20 mg by mouth nightly    BACLOFEN (LIORESAL) 10 MG TABLET    Take 10 mg by mouth 3 times daily    FAMOTIDINE (PEPCID) 20 MG TABLET    Take 20 mg by mouth

## 2024-07-26 LAB
ALBUMIN SERPL-MCNC: 2.9 G/DL (ref 3.4–5)
ALBUMIN/GLOB SERPL: 0.7 {RATIO} (ref 1.1–2.2)
ALP SERPL-CCNC: 155 U/L (ref 40–129)
ALT SERPL-CCNC: 9 U/L (ref 10–40)
ANION GAP SERPL CALCULATED.3IONS-SCNC: 11 MMOL/L (ref 3–16)
AST SERPL-CCNC: 10 U/L (ref 15–37)
BASOPHILS # BLD: 0 K/UL (ref 0–0.2)
BASOPHILS NFR BLD: 0.3 %
BILIRUB SERPL-MCNC: 0.3 MG/DL (ref 0–1)
BUN SERPL-MCNC: 10 MG/DL (ref 7–20)
CALCIUM SERPL-MCNC: 8.4 MG/DL (ref 8.3–10.6)
CHLORIDE SERPL-SCNC: 99 MMOL/L (ref 99–110)
CO2 SERPL-SCNC: 26 MMOL/L (ref 21–32)
CREAT SERPL-MCNC: 0.7 MG/DL (ref 0.9–1.3)
DEPRECATED RDW RBC AUTO: 16.3 % (ref 12.4–15.4)
EOSINOPHIL # BLD: 0.3 K/UL (ref 0–0.6)
EOSINOPHIL NFR BLD: 3.4 %
EST. AVERAGE GLUCOSE BLD GHB EST-MCNC: 200.1 MG/DL
GFR SERPLBLD CREATININE-BSD FMLA CKD-EPI: >90 ML/MIN/{1.73_M2}
GLUCOSE BLD-MCNC: 197 MG/DL (ref 70–99)
GLUCOSE BLD-MCNC: 209 MG/DL (ref 70–99)
GLUCOSE BLD-MCNC: 268 MG/DL (ref 70–99)
GLUCOSE BLD-MCNC: 316 MG/DL (ref 70–99)
GLUCOSE SERPL-MCNC: 287 MG/DL (ref 70–99)
HBA1C MFR BLD: 8.6 %
HCT VFR BLD AUTO: 31.5 % (ref 40.5–52.5)
HGB BLD-MCNC: 10.6 G/DL (ref 13.5–17.5)
LYMPHOCYTES # BLD: 2.2 K/UL (ref 1–5.1)
LYMPHOCYTES NFR BLD: 26.4 %
MCH RBC QN AUTO: 29 PG (ref 26–34)
MCHC RBC AUTO-ENTMCNC: 33.5 G/DL (ref 31–36)
MCV RBC AUTO: 86.3 FL (ref 80–100)
MONOCYTES # BLD: 0.6 K/UL (ref 0–1.3)
MONOCYTES NFR BLD: 7.8 %
NEUTROPHILS # BLD: 5.1 K/UL (ref 1.7–7.7)
NEUTROPHILS NFR BLD: 62.1 %
PERFORMED ON: ABNORMAL
PLATELET # BLD AUTO: 396 K/UL (ref 135–450)
PMV BLD AUTO: 8.5 FL (ref 5–10.5)
POTASSIUM SERPL-SCNC: 4.1 MMOL/L (ref 3.5–5.1)
PROT SERPL-MCNC: 6.8 G/DL (ref 6.4–8.2)
RBC # BLD AUTO: 3.65 M/UL (ref 4.2–5.9)
SODIUM SERPL-SCNC: 136 MMOL/L (ref 136–145)
VANCOMYCIN SERPL-MCNC: 22.1 UG/ML
WBC # BLD AUTO: 8.3 K/UL (ref 4–11)

## 2024-07-26 PROCEDURE — APPSS15 APP SPLIT SHARED TIME 0-15 MINUTES: Performed by: PHYSICIAN ASSISTANT

## 2024-07-26 PROCEDURE — APPNB15 APP NON BILLABLE TIME 0-15 MINS: Performed by: PHYSICIAN ASSISTANT

## 2024-07-26 PROCEDURE — 2580000003 HC RX 258: Performed by: INTERNAL MEDICINE

## 2024-07-26 PROCEDURE — 6360000002 HC RX W HCPCS: Performed by: INTERNAL MEDICINE

## 2024-07-26 PROCEDURE — 9990000010 HC NO CHARGE VISIT

## 2024-07-26 PROCEDURE — 1200000000 HC SEMI PRIVATE

## 2024-07-26 PROCEDURE — 94760 N-INVAS EAR/PLS OXIMETRY 1: CPT

## 2024-07-26 PROCEDURE — 6370000000 HC RX 637 (ALT 250 FOR IP): Performed by: INTERNAL MEDICINE

## 2024-07-26 PROCEDURE — 6370000000 HC RX 637 (ALT 250 FOR IP): Performed by: SURGERY

## 2024-07-26 PROCEDURE — 80202 ASSAY OF VANCOMYCIN: CPT

## 2024-07-26 PROCEDURE — 80053 COMPREHEN METABOLIC PANEL: CPT

## 2024-07-26 PROCEDURE — 85025 COMPLETE CBC W/AUTO DIFF WBC: CPT

## 2024-07-26 PROCEDURE — 36415 COLL VENOUS BLD VENIPUNCTURE: CPT

## 2024-07-26 RX ORDER — AMMONIUM LACTATE 12 G/100G
LOTION TOPICAL 2 TIMES DAILY
Status: DISCONTINUED | OUTPATIENT
Start: 2024-07-26 | End: 2024-08-01 | Stop reason: HOSPADM

## 2024-07-26 RX ORDER — VANCOMYCIN 1.75 G/350ML
1250 INJECTION, SOLUTION INTRAVENOUS EVERY 12 HOURS
Status: DISCONTINUED | OUTPATIENT
Start: 2024-07-27 | End: 2024-08-01 | Stop reason: HOSPADM

## 2024-07-26 RX ORDER — INSULIN LISPRO 100 [IU]/ML
15 INJECTION, SOLUTION INTRAVENOUS; SUBCUTANEOUS
Status: DISCONTINUED | OUTPATIENT
Start: 2024-07-26 | End: 2024-08-01 | Stop reason: HOSPADM

## 2024-07-26 RX ADMIN — ASPIRIN 81 MG: 81 TABLET, CHEWABLE ORAL at 09:13

## 2024-07-26 RX ADMIN — FUROSEMIDE 30 MG: 20 TABLET ORAL at 09:13

## 2024-07-26 RX ADMIN — FAMOTIDINE 20 MG: 20 TABLET, FILM COATED ORAL at 21:41

## 2024-07-26 RX ADMIN — SODIUM CHLORIDE, PRESERVATIVE FREE 10 ML: 5 INJECTION INTRAVENOUS at 21:42

## 2024-07-26 RX ADMIN — ATORVASTATIN CALCIUM 80 MG: 80 TABLET, FILM COATED ORAL at 21:41

## 2024-07-26 RX ADMIN — VANCOMYCIN 1250 MG: 1.75 INJECTION, SOLUTION INTRAVENOUS at 23:38

## 2024-07-26 RX ADMIN — GABAPENTIN 800 MG: 400 CAPSULE ORAL at 09:12

## 2024-07-26 RX ADMIN — SPIRONOLACTONE 25 MG: 25 TABLET ORAL at 09:13

## 2024-07-26 RX ADMIN — OXYCODONE HYDROCHLORIDE AND ACETAMINOPHEN 1 TABLET: 5; 325 TABLET ORAL at 09:12

## 2024-07-26 RX ADMIN — GABAPENTIN 800 MG: 400 CAPSULE ORAL at 21:41

## 2024-07-26 RX ADMIN — BACLOFEN 10 MG: 10 TABLET ORAL at 09:13

## 2024-07-26 RX ADMIN — GABAPENTIN 800 MG: 400 CAPSULE ORAL at 12:23

## 2024-07-26 RX ADMIN — Medication: at 21:42

## 2024-07-26 RX ADMIN — OXYCODONE HYDROCHLORIDE AND ACETAMINOPHEN 1 TABLET: 5; 325 TABLET ORAL at 16:32

## 2024-07-26 RX ADMIN — BACLOFEN 10 MG: 10 TABLET ORAL at 21:40

## 2024-07-26 RX ADMIN — VANCOMYCIN HYDROCHLORIDE 1500 MG: 1.5 INJECTION, POWDER, LYOPHILIZED, FOR SOLUTION INTRAVENOUS at 12:25

## 2024-07-26 RX ADMIN — CEFEPIME 2000 MG: 2 INJECTION, POWDER, FOR SOLUTION INTRAVENOUS at 06:03

## 2024-07-26 RX ADMIN — Medication: at 16:37

## 2024-07-26 RX ADMIN — INSULIN LISPRO 15 UNITS: 100 INJECTION, SOLUTION INTRAVENOUS; SUBCUTANEOUS at 12:23

## 2024-07-26 RX ADMIN — METRONIDAZOLE 500 MG: 500 INJECTION, SOLUTION INTRAVENOUS at 21:47

## 2024-07-26 RX ADMIN — LOSARTAN POTASSIUM 25 MG: 25 TABLET, FILM COATED ORAL at 09:13

## 2024-07-26 RX ADMIN — INSULIN LISPRO 2 UNITS: 100 INJECTION, SOLUTION INTRAVENOUS; SUBCUTANEOUS at 09:12

## 2024-07-26 RX ADMIN — OXYCODONE HYDROCHLORIDE AND ACETAMINOPHEN 500 MG: 500 TABLET ORAL at 09:12

## 2024-07-26 RX ADMIN — ATENOLOL 25 MG: 25 TABLET ORAL at 09:13

## 2024-07-26 RX ADMIN — INSULIN LISPRO 3 UNITS: 100 INJECTION, SOLUTION INTRAVENOUS; SUBCUTANEOUS at 12:24

## 2024-07-26 RX ADMIN — METRONIDAZOLE 500 MG: 500 INJECTION, SOLUTION INTRAVENOUS at 16:35

## 2024-07-26 RX ADMIN — EZETIMIBE 10 MG: 10 TABLET ORAL at 09:12

## 2024-07-26 RX ADMIN — CEFEPIME 2000 MG: 2 INJECTION, POWDER, FOR SOLUTION INTRAVENOUS at 16:38

## 2024-07-26 RX ADMIN — FUROSEMIDE 30 MG: 20 TABLET ORAL at 21:40

## 2024-07-26 RX ADMIN — CETIRIZINE HYDROCHLORIDE 10 MG: 10 TABLET, FILM COATED ORAL at 09:13

## 2024-07-26 RX ADMIN — INSULIN GLARGINE 45 UNITS: 100 INJECTION, SOLUTION SUBCUTANEOUS at 21:42

## 2024-07-26 RX ADMIN — TRAZODONE HYDROCHLORIDE 50 MG: 50 TABLET ORAL at 21:40

## 2024-07-26 RX ADMIN — METRONIDAZOLE 500 MG: 500 INJECTION, SOLUTION INTRAVENOUS at 06:32

## 2024-07-26 RX ADMIN — OXYCODONE HYDROCHLORIDE AND ACETAMINOPHEN 1 TABLET: 5; 325 TABLET ORAL at 21:40

## 2024-07-26 RX ADMIN — INSULIN LISPRO 15 UNITS: 100 INJECTION, SOLUTION INTRAVENOUS; SUBCUTANEOUS at 16:36

## 2024-07-26 RX ADMIN — OXYBUTYNIN CHLORIDE 10 MG: 10 TABLET, EXTENDED RELEASE ORAL at 09:13

## 2024-07-26 ASSESSMENT — PAIN SCALES - WONG BAKER
WONGBAKER_NUMERICALRESPONSE: HURTS LITTLE MORE
WONGBAKER_NUMERICALRESPONSE: HURTS WORST
WONGBAKER_NUMERICALRESPONSE: HURTS LITTLE MORE
WONGBAKER_NUMERICALRESPONSE: HURTS LITTLE MORE
WONGBAKER_NUMERICALRESPONSE: NO HURT
WONGBAKER_NUMERICALRESPONSE: NO HURT

## 2024-07-26 ASSESSMENT — PAIN DESCRIPTION - DESCRIPTORS: DESCRIPTORS: ACHING

## 2024-07-26 ASSESSMENT — PAIN DESCRIPTION - ONSET: ONSET: GRADUAL

## 2024-07-26 ASSESSMENT — PAIN SCALES - GENERAL
PAINLEVEL_OUTOF10: 4
PAINLEVEL_OUTOF10: 10

## 2024-07-26 ASSESSMENT — PAIN - FUNCTIONAL ASSESSMENT: PAIN_FUNCTIONAL_ASSESSMENT: PREVENTS OR INTERFERES SOME ACTIVE ACTIVITIES AND ADLS

## 2024-07-26 ASSESSMENT — PAIN DESCRIPTION - PAIN TYPE: TYPE: CHRONIC PAIN

## 2024-07-26 ASSESSMENT — PAIN DESCRIPTION - ORIENTATION: ORIENTATION: LEFT

## 2024-07-26 ASSESSMENT — PAIN DESCRIPTION - FREQUENCY: FREQUENCY: CONTINUOUS

## 2024-07-26 ASSESSMENT — PAIN DESCRIPTION - LOCATION: LOCATION: BACK;LEG
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
## 2024-07-26 NOTE — PROGRESS NOTES
4 Eyes Skin Assessment     NAME:  César Saunders  YOB: 1965  MEDICAL RECORD NUMBER:  [id number]    The patient is being assessed for  Admission    I agree that at least one RN has performed a thorough Head to Toe Skin Assessment on the patient. ALL assessment sites listed below have been assessed.      Areas assessed by both nurses:    Head, Face, Ears, Shoulders, Back, Chest, Arms, Elbows, Hands, Sacrum. Buttock, Coccyx, Ischium, Legs. Feet and Heels, and Under Medical Devices         Does the Patient have a Wound? Yes wound(s) were present on assessment. LDA wound assessment was Initiated and completed by RN       Murphy Prevention initiated by RN: Yes  Wound Care Orders initiated by RN: Yes    Pressure Injury (Stage 3,4, Unstageable, DTI, NWPT, and Complex wounds) if present, place Wound referral order by RN under : Yes    New Ostomies, if present place, Ostomy referral order under : No     Nurse 1 eSignature: Electronically signed by Hollie Díaz RN on 7/25/24 at 10:46 PM EDT    **SHARE this note so that the co-signing nurse can place an eSignature**    Nurse 2 eSignature: Electronically signed by Lidia Jackson RN on 7/26/24 at 2:41 AM EDT

## 2024-07-26 NOTE — PROGRESS NOTES
Occupational Therapy    07/26/24    César Saunders  1965  [id number]    OT orders noted. Patient chart reviewed. Attempted to see for evaluation with PT. Pt resting in bed upon arrival. Pt reporting living in assisted living at [facility] and independent with ADL, cleaning, laundry, and sit pivot to/from power wheel chair, and managing wheel chair. Facility provides meals. Pt educated on OT role. Educated pt on need for cont pressure relief in bed, pt aware and reports he has been completing. Reports power chair able to fully recliner and to off load for pressure relief. Pt reporting no concerns for ability to complete pivot tx at this time and requesting no OT/PT evaluation. Did educate pt if ADL or bed mobility and pivot tx become impaired during admission, will need new therapy orders.     At this time, will sign off.     Electronically signed by ALYCE Bruno, OTR/L on 7/26/2024 at 10:31 AM

## 2024-07-26 NOTE — PROGRESS NOTES
V2.0    [facility] Progress Note      Name:  César Saunders /Age/Sex: 1965  (59 y.o. male)   MRN & CSN:  [id number] & [id number] Encounter Date/Time: 2024 8:12 AM EDT   Location:  C3B-9674/3108-01 PCP: Ulises Hager MD     Attending:Ras Dhaliwal MD       Hospital Day: 2    Assessment and Recommendations   César Saunders is a 59 y.o. male who presents with Cellulitis    59-year-old patient admitted to the hospital with bilateral buttock wounds ulcers likely infected started on IV antibiotics General surgery consulted  Plan:   Cellulitis with bilateral buttocks ulcers and wounds, vancomycin, Flagyl and cefepime General surgery consulted, improved white count down from 13.9 this morning 8.3.  CRP elevated on admission seven 5.8 sed rate 92  Diabetes mellitus sliding scale, preprandial and Lantus, will adjust preprandial increasing from 12 up to 15 units.  Blood sugar 287  Paraplegia PT OT  Essential hypertension p.o. medication  History of VTE not on anticoagulation at this time      Diet ADULT DIET; Regular; 4 carb choices (60 gm/meal)   DVT Prophylaxis [] Lovenox, []  Heparin, [] SCDs, [] Ambulation,  [] Eliquis, [] Xarelto  [] Coumadin   Code Status Full Code             Personally reviewed Lab Studies and Imaging     Discussed management of the case with general surgery     Telemetry strip reviewed by myself no ST elevation heart rate at 68    Drugs that require monitoring for toxicity include vancomycin and the method of monitoring was creatinine    Medical Decision Making:  The following items were considered in medical decision making:  Discussion of patient care with other providers  Reviewed clinical lab tests  Reviewed radiology tests  Reviewed other diagnostic tests/interventions  Independent review of radiologic images  Microbiology cultures and other micro tests reviewed      Subjective:     Chief Complaint: Buttocks ulcers    César Saunders is a 59 y.o. male who presents with  [Fatigue: Grade 1 - Fatigue relieved by rest] : Fatigue: Grade 1 - Fatigue relieved by rest [Breast Pain: Grade 1 - Mild pain] : Breast Pain: Grade 1 - Mild pain [Headache: Grade 1 - Mild pain] : Headache: Grade 1 - Mild pain [Cough: Grade 0] : Cough: Grade 0 [Dyspnea: Grade 1 - Shortness of breath with moderate exertion] : Dyspnea: Grade 1 - Shortness of breath with moderate exertion [Hiccups: Grade 0] : Hiccups: Grade 0 [Hoarseness: Grade 0] : Hoarseness: Grade 0 [Hypoxia: Grade 0] : Hypoxia: Grade 0 [Pharyngeal Mucositis: Grade 0] : Pharyngeal Mucositis: Grade 0 [Pneumonitis: Grade 0] : Pneumonitis: Grade 0 [Voice Alteration: Grade 0] : Voice Alteration: Grade 0 [Pruritus: Grade 1 - Mild or localized; topical intervention indicated] : Pruritus: Grade 1 - Mild or localized; topical intervention indicated [Alopecia: Grade 0] : Alopecia: Grade 0 [Skin Atrophy: Grade 0] : Skin Atrophy: Grade 0 [Skin Hyperpigmentation: Grade 0] : Skin Hyperpigmentation: Grade 0 [Skin Induration: Grade 0] : Skin Induration: Grade 0 [Dermatitis Radiation: Grade 2 - Moderate to brisk erythema; patchy moist desquamation, mostly confined to skin folds and creases; moderate edema] : Dermatitis Radiation: Grade 2 - Moderate to brisk erythema; patchy moist desquamation, mostly confined to skin folds and creases; moderate edema [FreeTextEntry7] : slight soreness right breast [FreeTextEntry6] : increased  redness  [FreeTextEntry2] : hydrocortisone cream

## 2024-07-26 NOTE — PROGRESS NOTES
Report received at bedside for this pt. Pt resting w/ eyes closed in bed during bedside report. BP elevated, vital signs otherwise stable. White board has been updated for the day, call light within reach and fall precautions in place. Pt has no additional needs at this time. Plan of care ongoing. Electronically signed by Tessie Garcia RN on 7/26/2024 at 8:34 AM
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
## 2024-07-26 NOTE — PROGRESS NOTES
Patient admitted to room 3108 from ED. Oriented to room, call light, and floor policies. Plan of care reviewed with patient. Pt is resting in bed, c/o back pain which is chronic, scheduled meds given without any issue, tolerated PO well; no s/s of distress noted. Assessment completed; dressing placed to wound on sacrum, heel boots applied to svetlana feet. Tele in place reading sinus rhythm with a rate of 88. Pt rates a high fall risk; bed alarm on. Safety precautions in place; call light and bedside table within reach.  Pt encouraged to call for needs. Plan of care ongoing.

## 2024-07-26 NOTE — PROGRESS NOTES
Physical Therapy      Bristol Smith  7/26/2024    -chart reviewed  -patient interviewed   -states he manages all of his mobility to and from the power chair   -reports his power chair does go nearly flat to unload buttock pressures and he does this occasionally throughout day    -no acute new PT needs   -will sign off     Electronically signed by DAISY AUSTIN, PT on 7/26/2024 at 10:30 AM    Addendum: he states power chair battery has good charge

## 2024-07-26 NOTE — PROGRESS NOTES
Clinical Pharmacy Note  Vancomycin Consult    César Saunders is a 59 y.o. male ordered vancomycin for cellulitis; consult received from Dr. Dhaliwal to manage therapy. Also receiving cefepime and metronidazole.    Allergies:  Ciprofloxacin, Ertapenem, and Pcn [penicillins]     Temp max:  Temp (24hrs), Av.5 °F (36.4 °C), Min:93.8 °F (34.3 °C), Max:98.6 °F (37 °C)      Recent Labs     24  1516 24  0420   WBC 13.9* 8.3       Recent Labs     24  1516 24  0420   BUN 9 10   CREATININE 0.6* 0.7*         Intake/Output Summary (Last 24 hours) at 2024 1948  Last data filed at 2024 1816  Gross per 24 hour   Intake 1000 ml   Output 1400 ml   Net -400 ml       Culture Results:  pending    Ht Readings from Last 1 Encounters:   24 1.727 m (5' 8\")        Wt Readings from Last 1 Encounters:   24 95 kg (209 lb 7 oz)         Estimated Creatinine Clearance: 130 mL/min (A) (based on SCr of 0.7 mg/dL (L)).    Assessment:  Day # 2 of vancomycin.  Current regimen: 1500 mg every 12 hours  Vancomycin level: 22.1 mg/L (~5 hours after dose given)  Predicted AUC: 568 (24-48 hour), 608 (steady state)  Renal function stable, but history of paraplegia so will monitor closely d/t potential for overestimation of renal function     Plan:  Change regimen to 1250 mg every 12 hours.  Predicted  (24-48 hour), 509 (steady state)  Will obtain level after 2 doses of new regimen ( at 2100) and monitor closely    Thank you for the consult.   Kayla Pulido, CindiD, BCPS  2024 7:53 PM

## 2024-07-26 NOTE — PROGRESS NOTES
Comprehensive Nutrition Assessment    Type and Reason for Visit:  Initial, Positive Nutrition Screen    Nutrition Recommendations/Plan:   Resume CCC (4) diet  Monitor wound healing  Monitor wt trend     Malnutrition Assessment:  Malnutrition Status:  At risk for malnutrition (Comment) (07/26/24 1238)    Context:  Acute Illness     Findings of the 6 clinical characteristics of malnutrition:  Energy Intake:  No significant decrease in energy intake  Weight Loss:  Unable to assess     Body Fat Loss:  No significant body fat loss     Muscle Mass Loss:  No significant muscle mass loss    Fluid Accumulation:  Moderate to Severe Extremities   Strength:       Nutrition Assessment:    MST for altered skin integrity. PMH includes; Paraplegia, HTN, Chronic pain. Pt adm from assisted living. Pt adm r /t cellulitis of the buttocks. Pt noted with unstageable area to sacrum as well as DTI to lower back. Diet adv to CCC (4) but this am new orders for NPO noted. Surgery evaluated pt this date with no surgical intervention planned at this time. Pt endorsed a good appetite, refusing all ONS options at this time. Discussed importance of nutrient dense choices to help maintain nutrition and help with healing. Pt agreed to try. With no surgery planned, anticipate diet advancement. Weight records reviewed. Difficult to assess for changes as wts are all variable from day to day. Pt believes that his wt is stable. Will monitor wt trend. Will continue to monitor progress.    Nutrition Related Findings:    Labs reviewed. Noted lantus and coverage to help maintain BS. Noted A1C on 7/25 at 8.6. Noted BM on 7/25. Noted Vitamin C on board. Noted +3 nonpitting edema to BLE. Wound Type: Multiple, Unstageable, Deep Tissue Injury       Current Nutrition Intake & Therapies:    Average Meal Intake: %     Diet: NPO    Anthropometric Measures:  Height: 172.7 cm (5' 8\")  Ideal Body Weight (IBW): 154 lbs (70 kg)    Admission Body Weight: 98.9 kg

## 2024-07-26 NOTE — CONSULTS
Clinical Pharmacy Note  Vancomycin Consult    César Saunders is a 59 y.o. male ordered vancomycin for cellulitis; consult received from Dr. Dhaliwal to manage therapy. Also receiving cefepime and metronidazole.    Allergies:  Ciprofloxacin, Ertapenem, and Pcn [penicillins]     Temp max:  Temp (24hrs), Av.1 °F (36.7 °C), Min:97.8 °F (36.6 °C), Max:98.5 °F (36.9 °C)      Recent Labs     24  1516 24  0420   WBC 13.9* 8.3       Recent Labs     24  1516 24  0420   BUN 9 10   CREATININE 0.6* 0.7*         Intake/Output Summary (Last 24 hours) at 2024 1026  Last data filed at 2024 1025  Gross per 24 hour   Intake 780 ml   Output 800 ml   Net -20 ml       Culture Results:  pending    Ht Readings from Last 1 Encounters:   24 1.727 m (5' 8\")        Wt Readings from Last 1 Encounters:   24 95 kg (209 lb 7 oz)         Estimated Creatinine Clearance: 130 mL/min (A) (based on SCr of 0.7 mg/dL (L)).    Assessment/Plan:  Day # 1 of vancomycin.  Vancomycin 1500 mg IV every 12 hours.    Goal -600  Predicted  (24-48 hour AUC), 486 steady state  History of paraplegia so will monitor closely d/t potential for overestimation of renal function  Level ordered for 1800 on     Thank you for the consult.   Kayla Pulido, PharmD, BCPS  2024 10:29 AM

## 2024-07-26 NOTE — CARE COORDINATION
Case Management Assessment  Initial Evaluation    Date/Time of Evaluation: 7/26/2024 4:18 PM  Assessment Completed by: WENDY Carver    If patient is discharged prior to next notation, then this note serves as note for discharge by case management.    Patient Name: César Saunders                   YOB: 1965  Diagnosis: Elevated C-reactive protein (CRP) [R79.82]  Cellulitis of buttock [L03.317]  Cellulitis [L03.90]  Elevated erythrocyte sedimentation rate [R70.0]  Goals of care, counseling/discussion [Z71.89]  Buttock wound, right, initial encounter [S31.819A]  Wound of left buttock, initial encounter [S31.820K]  Type 2 diabetes mellitus with other circulatory complication, with long-term current use of insulin (HCC) [E11.59, Z79.4]                   Date / Time: 7/25/2024  2:01 PM    Patient Admission Status: Inpatient   Readmission Risk (Low < 19, Mod (19-27), High > 27): Readmission Risk Score: 15.9    Current PCP: Ulises Hager MD  PCP verified by CM? Yes    Chart Reviewed: Yes      History Provided by: Medical Record, Patient  Patient Orientation: Alert and Oriented    Patient Cognition: Alert    Hospitalization in the last 30 days (Readmission):  No    If yes, Readmission Assessment in  Navigator will be completed.    Advance Directives:      Code Status: Full Code   Patient's Primary Decision Maker is: Legal Next of Kin    Primary Decision Maker: Yu Rodriguez - Spouse - [phone]    Discharge Planning:    Patient lives with: Alone Type of Home: Assisted living  Primary Care Giver: Self  Patient Support Systems include: Family Members   Current Financial resources: Medicaid  Current community resources: None  Current services prior to admission: Other (Comment), Durable Medical Equipment (assisted living)            Current DME: Wheelchair, Shower Chair            Type of Home Care services:  None    ADLS  Prior functional level: Assistance with the following:, Cooking  Current  functional level: Assistance with the following:, Cooking    PT AM-PAC:   /24  OT AM-PAC:   /24    Family can provide assistance at DC: No  Would you like Case Management to discuss the discharge plan with any other family members/significant others, and if so, who? No  Plans to Return to Present Housing: Yes  Other Identified Issues/Barriers to RETURNING to current housing: none  Potential Assistance needed at discharge: Skilled Nursing Facility            Potential DME:  no  Patient expects to discharge to: Assisted living  Plan for transportation at discharge: Other (see comment) (may require WC transport; patient WC here at hospital)    Financial    Payor: MEDICAID [place] / Plan: MEDICAID [facility] DEPT OF JOB / Product Type: *No Product type* /     Does insurance require precert for SNF: No    Potential assistance Purchasing Medications: No  Meds-to-Beds request:  no      [facility] [place], [place] - [address] [phone] - F [phone] [address] [place] [address]  Phone: [phone] Fax: [phone]      Notes:    Factors facilitating achievement of predicted outcomes: Motivated and Cooperative    Barriers to discharge: none    Additional Case Management Notes: Spoke with patient. He is from [facility]. He reported he has meals provided but does everything else for himself.   Discussed possible need for ongoing IV antibiotics  and possible snf placement; provided snf list. Patient would be agreeable to [facility] if needed.   Referral made to Tori at [facility].    The Plan for Transition of Care is related to the following treatment goals of Elevated C-reactive protein (CRP) [R79.82]  Cellulitis of buttock [L03.317]  Cellulitis [L03.90]  Elevated erythrocyte sedimentation rate [R70.0]  Goals of care, counseling/discussion [Z71.89]  Buttock wound, right, initial encounter [S31.819A]  Wound of left buttock, initial encounter [S31.829A]  Type 2

## 2024-07-27 LAB
ALBUMIN SERPL-MCNC: 3.2 G/DL (ref 3.4–5)
ALBUMIN/GLOB SERPL: 0.9 {RATIO} (ref 1.1–2.2)
ALP SERPL-CCNC: 151 U/L (ref 40–129)
ALT SERPL-CCNC: 10 U/L (ref 10–40)
ANION GAP SERPL CALCULATED.3IONS-SCNC: 8 MMOL/L (ref 3–16)
AST SERPL-CCNC: 11 U/L (ref 15–37)
BASOPHILS # BLD: 0.1 K/UL (ref 0–0.2)
BASOPHILS NFR BLD: 1.4 %
BILIRUB SERPL-MCNC: <0.2 MG/DL (ref 0–1)
BUN SERPL-MCNC: 11 MG/DL (ref 7–20)
CALCIUM SERPL-MCNC: 8.4 MG/DL (ref 8.3–10.6)
CHLORIDE SERPL-SCNC: 103 MMOL/L (ref 99–110)
CO2 SERPL-SCNC: 28 MMOL/L (ref 21–32)
CREAT SERPL-MCNC: 0.7 MG/DL (ref 0.9–1.3)
DEPRECATED RDW RBC AUTO: 16.5 % (ref 12.4–15.4)
EOSINOPHIL # BLD: 0.3 K/UL (ref 0–0.6)
EOSINOPHIL NFR BLD: 2.7 %
GFR SERPLBLD CREATININE-BSD FMLA CKD-EPI: >90 ML/MIN/{1.73_M2}
GLUCOSE BLD-MCNC: 152 MG/DL (ref 70–99)
GLUCOSE BLD-MCNC: 183 MG/DL (ref 70–99)
GLUCOSE BLD-MCNC: 201 MG/DL (ref 70–99)
GLUCOSE BLD-MCNC: 224 MG/DL (ref 70–99)
GLUCOSE SERPL-MCNC: 217 MG/DL (ref 70–99)
HCT VFR BLD AUTO: 33 % (ref 40.5–52.5)
HGB BLD-MCNC: 11 G/DL (ref 13.5–17.5)
LYMPHOCYTES # BLD: 1.8 K/UL (ref 1–5.1)
LYMPHOCYTES NFR BLD: 19.8 %
MCH RBC QN AUTO: 28.7 PG (ref 26–34)
MCHC RBC AUTO-ENTMCNC: 33.2 G/DL (ref 31–36)
MCV RBC AUTO: 86.5 FL (ref 80–100)
MONOCYTES # BLD: 0.7 K/UL (ref 0–1.3)
MONOCYTES NFR BLD: 7.7 %
NEUTROPHILS # BLD: 6.3 K/UL (ref 1.7–7.7)
NEUTROPHILS NFR BLD: 68.4 %
PERFORMED ON: ABNORMAL
PLATELET # BLD AUTO: 429 K/UL (ref 135–450)
PMV BLD AUTO: 8.8 FL (ref 5–10.5)
POTASSIUM SERPL-SCNC: 4.4 MMOL/L (ref 3.5–5.1)
PROT SERPL-MCNC: 6.7 G/DL (ref 6.4–8.2)
RBC # BLD AUTO: 3.82 M/UL (ref 4.2–5.9)
SODIUM SERPL-SCNC: 139 MMOL/L (ref 136–145)
VANCOMYCIN SERPL-MCNC: 15.1 UG/ML
WBC # BLD AUTO: 9.3 K/UL (ref 4–11)

## 2024-07-27 PROCEDURE — 1200000000 HC SEMI PRIVATE

## 2024-07-27 PROCEDURE — 80202 ASSAY OF VANCOMYCIN: CPT

## 2024-07-27 PROCEDURE — 80053 COMPREHEN METABOLIC PANEL: CPT

## 2024-07-27 PROCEDURE — 36415 COLL VENOUS BLD VENIPUNCTURE: CPT

## 2024-07-27 PROCEDURE — 6360000002 HC RX W HCPCS: Performed by: INTERNAL MEDICINE

## 2024-07-27 PROCEDURE — 2580000003 HC RX 258: Performed by: INTERNAL MEDICINE

## 2024-07-27 PROCEDURE — 6370000000 HC RX 637 (ALT 250 FOR IP): Performed by: INTERNAL MEDICINE

## 2024-07-27 PROCEDURE — 85025 COMPLETE CBC W/AUTO DIFF WBC: CPT

## 2024-07-27 RX ADMIN — ATORVASTATIN CALCIUM 80 MG: 80 TABLET, FILM COATED ORAL at 21:08

## 2024-07-27 RX ADMIN — OXYCODONE HYDROCHLORIDE AND ACETAMINOPHEN 1 TABLET: 5; 325 TABLET ORAL at 21:08

## 2024-07-27 RX ADMIN — FUROSEMIDE 30 MG: 20 TABLET ORAL at 08:07

## 2024-07-27 RX ADMIN — EZETIMIBE 10 MG: 10 TABLET ORAL at 09:14

## 2024-07-27 RX ADMIN — INSULIN LISPRO 15 UNITS: 100 INJECTION, SOLUTION INTRAVENOUS; SUBCUTANEOUS at 16:54

## 2024-07-27 RX ADMIN — VANCOMYCIN 1250 MG: 1.75 INJECTION, SOLUTION INTRAVENOUS at 11:52

## 2024-07-27 RX ADMIN — OXYCODONE HYDROCHLORIDE AND ACETAMINOPHEN 1 TABLET: 5; 325 TABLET ORAL at 14:59

## 2024-07-27 RX ADMIN — Medication: at 21:08

## 2024-07-27 RX ADMIN — OXYBUTYNIN CHLORIDE 10 MG: 10 TABLET, EXTENDED RELEASE ORAL at 08:07

## 2024-07-27 RX ADMIN — Medication: at 08:08

## 2024-07-27 RX ADMIN — CEFEPIME 2000 MG: 2 INJECTION, POWDER, FOR SOLUTION INTRAVENOUS at 17:55

## 2024-07-27 RX ADMIN — ASPIRIN 81 MG: 81 TABLET, CHEWABLE ORAL at 08:07

## 2024-07-27 RX ADMIN — INSULIN LISPRO 1 UNITS: 100 INJECTION, SOLUTION INTRAVENOUS; SUBCUTANEOUS at 08:06

## 2024-07-27 RX ADMIN — INSULIN LISPRO 15 UNITS: 100 INJECTION, SOLUTION INTRAVENOUS; SUBCUTANEOUS at 08:06

## 2024-07-27 RX ADMIN — FAMOTIDINE 20 MG: 20 TABLET, FILM COATED ORAL at 21:08

## 2024-07-27 RX ADMIN — GABAPENTIN 800 MG: 400 CAPSULE ORAL at 21:08

## 2024-07-27 RX ADMIN — METRONIDAZOLE 500 MG: 500 INJECTION, SOLUTION INTRAVENOUS at 22:36

## 2024-07-27 RX ADMIN — GABAPENTIN 800 MG: 400 CAPSULE ORAL at 14:05

## 2024-07-27 RX ADMIN — BACLOFEN 10 MG: 10 TABLET ORAL at 21:08

## 2024-07-27 RX ADMIN — ATENOLOL 25 MG: 25 TABLET ORAL at 08:07

## 2024-07-27 RX ADMIN — BACLOFEN 10 MG: 10 TABLET ORAL at 08:07

## 2024-07-27 RX ADMIN — GABAPENTIN 800 MG: 400 CAPSULE ORAL at 08:07

## 2024-07-27 RX ADMIN — FUROSEMIDE 30 MG: 20 TABLET ORAL at 21:08

## 2024-07-27 RX ADMIN — INSULIN GLARGINE 45 UNITS: 100 INJECTION, SOLUTION SUBCUTANEOUS at 21:08

## 2024-07-27 RX ADMIN — LOSARTAN POTASSIUM 25 MG: 25 TABLET, FILM COATED ORAL at 08:07

## 2024-07-27 RX ADMIN — CEFEPIME 2000 MG: 2 INJECTION, POWDER, FOR SOLUTION INTRAVENOUS at 05:27

## 2024-07-27 RX ADMIN — METRONIDAZOLE 500 MG: 500 INJECTION, SOLUTION INTRAVENOUS at 15:01

## 2024-07-27 RX ADMIN — INSULIN LISPRO 15 UNITS: 100 INJECTION, SOLUTION INTRAVENOUS; SUBCUTANEOUS at 11:50

## 2024-07-27 RX ADMIN — OXYCODONE HYDROCHLORIDE AND ACETAMINOPHEN 1 TABLET: 5; 325 TABLET ORAL at 08:08

## 2024-07-27 RX ADMIN — OXYCODONE HYDROCHLORIDE AND ACETAMINOPHEN 500 MG: 500 TABLET ORAL at 08:08

## 2024-07-27 RX ADMIN — TRAZODONE HYDROCHLORIDE 50 MG: 50 TABLET ORAL at 21:08

## 2024-07-27 RX ADMIN — VANCOMYCIN 1250 MG: 1.75 INJECTION, SOLUTION INTRAVENOUS at 23:43

## 2024-07-27 RX ADMIN — METRONIDAZOLE 500 MG: 500 INJECTION, SOLUTION INTRAVENOUS at 05:24

## 2024-07-27 RX ADMIN — SODIUM CHLORIDE, PRESERVATIVE FREE 10 ML: 5 INJECTION INTRAVENOUS at 21:10

## 2024-07-27 RX ADMIN — CETIRIZINE HYDROCHLORIDE 10 MG: 10 TABLET, FILM COATED ORAL at 08:07

## 2024-07-27 RX ADMIN — SPIRONOLACTONE 25 MG: 25 TABLET ORAL at 08:08

## 2024-07-27 ASSESSMENT — PAIN SCALES - GENERAL
PAINLEVEL_OUTOF10: 10
PAINLEVEL_OUTOF10: 4

## 2024-07-27 ASSESSMENT — PAIN DESCRIPTION - ORIENTATION
ORIENTATION: LOWER
ORIENTATION: RIGHT;LEFT;LOWER
ORIENTATION: RIGHT;LEFT

## 2024-07-27 ASSESSMENT — PAIN DESCRIPTION - DESCRIPTORS
DESCRIPTORS: THROBBING
DESCRIPTORS: ACHING;DISCOMFORT
DESCRIPTORS: ACHING;DISCOMFORT

## 2024-07-27 ASSESSMENT — PAIN DESCRIPTION - LOCATION
LOCATION: LEG;BACK
LOCATION: BACK
LOCATION: LEG;BACK

## 2024-07-27 ASSESSMENT — PAIN SCALES - WONG BAKER
WONGBAKER_NUMERICALRESPONSE: HURTS LITTLE MORE
WONGBAKER_NUMERICALRESPONSE: HURTS WORST

## 2024-07-27 ASSESSMENT — PAIN DESCRIPTION - PAIN TYPE: TYPE: ACUTE PAIN;CHRONIC PAIN

## 2024-07-27 ASSESSMENT — PAIN DESCRIPTION - FREQUENCY: FREQUENCY: CONTINUOUS

## 2024-07-27 ASSESSMENT — PAIN DESCRIPTION - ONSET: ONSET: ON-GOING

## 2024-07-27 ASSESSMENT — PAIN - FUNCTIONAL ASSESSMENT: PAIN_FUNCTIONAL_ASSESSMENT: PREVENTS OR INTERFERES SOME ACTIVE ACTIVITIES AND ADLS

## 2024-07-27 NOTE — PLAN OF CARE
Problem: Discharge Planning  Goal: Discharge to home or other facility with appropriate resources  7/26/2024 2226 by Ivett Samuel RN  Outcome: Progressing  Flowsheets (Taken 7/26/2024 2226)  Discharge to home or other facility with appropriate resources:   Refer to discharge planning if patient needs post-hospital services based on physician order or complex needs related to functional status, cognitive ability or social support system   Identify discharge learning needs (meds, wound care, etc)   Identify barriers to discharge with patient and caregiver   Arrange for needed discharge resources and transportation as appropriate     Problem: Pain  Goal: Verbalizes/displays adequate comfort level or baseline comfort level  7/26/2024 2226 by Ivett Samuel RN  Outcome: Progressing  Flowsheets (Taken 7/26/2024 2226)  Verbalizes/displays adequate comfort level or baseline comfort level:   Consider cultural and social influences on pain and pain management   Administer analgesics based on type and severity of pain and evaluate response   Encourage patient to monitor pain and request assistance   Assess pain using appropriate pain scale   Implement non-pharmacological measures as appropriate and evaluate response   Notify Licensed Independent Practitioner if interventions unsuccessful or patient reports new pain     Problem: Skin/Tissue Integrity  Goal: Absence of new skin breakdown  Description: 1.  Monitor for areas of redness and/or skin breakdown  2.  Assess vascular access sites hourly  3.  Every 4-6 hours minimum:  Change oxygen saturation probe site  4.  Every 4-6 hours:  If on nasal continuous positive airway pressure, respiratory therapy assess nares and determine need for appliance change or resting period.  7/26/2024 2226 by Ivett Samuel, RN  Outcome: Progressing     Problem: Safety - Adult  Goal: Free from fall injury  7/26/2024 2226 by Ivett Samuel, RN  Outcome: Progressing  Flowsheets (Taken 7/26/2024

## 2024-07-27 NOTE — PLAN OF CARE
Problem: Discharge Planning  Goal: Discharge to home or other facility with appropriate resources  7/27/2024 1015 by Shelbi Eastman RN  Outcome: Progressing  7/27/2024 1015 by Shelbi Eastman RN  Outcome: Progressing  7/26/2024 2226 by Ivett Samuel RN  Outcome: Progressing  Flowsheets (Taken 7/26/2024 2226)  Discharge to home or other facility with appropriate resources:   Refer to discharge planning if patient needs post-hospital services based on physician order or complex needs related to functional status, cognitive ability or social support system   Identify discharge learning needs (meds, wound care, etc)   Identify barriers to discharge with patient and caregiver   Arrange for needed discharge resources and transportation as appropriate     Problem: Pain  Goal: Verbalizes/displays adequate comfort level or baseline comfort level  7/27/2024 1015 by Shelbi Eastman RN  Outcome: Progressing  7/27/2024 1015 by Shelbi Eastman RN  Outcome: Progressing  7/26/2024 2226 by Ivett Samuel RN  Outcome: Progressing  Flowsheets (Taken 7/26/2024 2226)  Verbalizes/displays adequate comfort level or baseline comfort level:   Consider cultural and social influences on pain and pain management   Administer analgesics based on type and severity of pain and evaluate response   Encourage patient to monitor pain and request assistance   Assess pain using appropriate pain scale   Implement non-pharmacological measures as appropriate and evaluate response   Notify Licensed Independent Practitioner if interventions unsuccessful or patient reports new pain     Problem: Skin/Tissue Integrity  Goal: Absence of new skin breakdown  Description: 1.  Monitor for areas of redness and/or skin breakdown  2.  Assess vascular access sites hourly  3.  Every 4-6 hours minimum:  Change oxygen saturation probe site  4.  Every 4-6 hours:  If on nasal continuous positive airway pressure, respiratory therapy assess nares and determine need

## 2024-07-27 NOTE — PLAN OF CARE
Problem: Discharge Planning  Goal: Discharge to home or other facility with appropriate resources  7/27/2024 1927 by Ivett Samuel, RN  Outcome: Progressing  Flowsheets (Taken 7/27/2024 1927)  Discharge to home or other facility with appropriate resources:   Refer to discharge planning if patient needs post-hospital services based on physician order or complex needs related to functional status, cognitive ability or social support system   Identify discharge learning needs (meds, wound care, etc)   Identify barriers to discharge with patient and caregiver   Arrange for needed discharge resources and transportation as appropriate     Problem: Pain  Goal: Verbalizes/displays adequate comfort level or baseline comfort level  7/27/2024 1927 by Ivett Samuel RN  Outcome: Progressing  Flowsheets (Taken 7/27/2024 1927)  Verbalizes/displays adequate comfort level or baseline comfort level:   Encourage patient to monitor pain and request assistance   Administer analgesics based on type and severity of pain and evaluate response   Consider cultural and social influences on pain and pain management   Assess pain using appropriate pain scale   Implement non-pharmacological measures as appropriate and evaluate response   Notify Licensed Independent Practitioner if interventions unsuccessful or patient reports new pain     Problem: Skin/Tissue Integrity  Goal: Absence of new skin breakdown  Description: 1.  Monitor for areas of redness and/or skin breakdown  2.  Assess vascular access sites hourly  3.  Every 4-6 hours minimum:  Change oxygen saturation probe site  4.  Every 4-6 hours:  If on nasal continuous positive airway pressure, respiratory therapy assess nares and determine need for appliance change or resting period.  7/27/2024 1927 by Ivett Samuel, RN  Outcome: Progressing     Problem: Safety - Adult  Goal: Free from fall injury  7/27/2024 1927 by Ivett Samuel, RN  Outcome: Progressing  Flowsheets (Taken 7/27/2024

## 2024-07-27 NOTE — PROGRESS NOTES
Patient is alert and oriented x 4 in bed. Vital, assessment and daily care given. Education and care plan updated. Medication given as per order. Fall precaution initiated, call light within reach, bed in low position and wheels locked. Addressed current patient's need. Continue to monitor.    Electronically signed by Ivett Samuel RN on 7/26/2024 at 11:00 PM
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
## 2024-07-28 LAB
ALBUMIN SERPL-MCNC: 3.4 G/DL (ref 3.4–5)
ANION GAP SERPL CALCULATED.3IONS-SCNC: 10 MMOL/L (ref 3–16)
BASOPHILS # BLD: 0.1 K/UL (ref 0–0.2)
BASOPHILS NFR BLD: 1.1 %
BUN SERPL-MCNC: 10 MG/DL (ref 7–20)
CALCIUM SERPL-MCNC: 8.4 MG/DL (ref 8.3–10.6)
CHLORIDE SERPL-SCNC: 98 MMOL/L (ref 99–110)
CO2 SERPL-SCNC: 29 MMOL/L (ref 21–32)
CREAT SERPL-MCNC: 0.7 MG/DL (ref 0.9–1.3)
DEPRECATED RDW RBC AUTO: 16.4 % (ref 12.4–15.4)
EOSINOPHIL # BLD: 0.3 K/UL (ref 0–0.6)
EOSINOPHIL NFR BLD: 3.5 %
GFR SERPLBLD CREATININE-BSD FMLA CKD-EPI: >90 ML/MIN/{1.73_M2}
GLUCOSE BLD-MCNC: 109 MG/DL (ref 70–99)
GLUCOSE BLD-MCNC: 113 MG/DL (ref 70–99)
GLUCOSE BLD-MCNC: 147 MG/DL (ref 70–99)
GLUCOSE BLD-MCNC: 210 MG/DL (ref 70–99)
GLUCOSE SERPL-MCNC: 208 MG/DL (ref 70–99)
HCT VFR BLD AUTO: 34.1 % (ref 40.5–52.5)
HGB BLD-MCNC: 11.6 G/DL (ref 13.5–17.5)
LYMPHOCYTES # BLD: 2 K/UL (ref 1–5.1)
LYMPHOCYTES NFR BLD: 22.1 %
MAGNESIUM SERPL-MCNC: 2.2 MG/DL (ref 1.8–2.4)
MCH RBC QN AUTO: 29.4 PG (ref 26–34)
MCHC RBC AUTO-ENTMCNC: 34.1 G/DL (ref 31–36)
MCV RBC AUTO: 86.2 FL (ref 80–100)
MONOCYTES # BLD: 0.6 K/UL (ref 0–1.3)
MONOCYTES NFR BLD: 7.1 %
NEUTROPHILS # BLD: 5.8 K/UL (ref 1.7–7.7)
NEUTROPHILS NFR BLD: 66.2 %
PERFORMED ON: ABNORMAL
PHOSPHATE SERPL-MCNC: 3.2 MG/DL (ref 2.5–4.9)
PLATELET # BLD AUTO: 436 K/UL (ref 135–450)
PMV BLD AUTO: 8.3 FL (ref 5–10.5)
POTASSIUM SERPL-SCNC: 4 MMOL/L (ref 3.5–5.1)
RBC # BLD AUTO: 3.96 M/UL (ref 4.2–5.9)
SODIUM SERPL-SCNC: 137 MMOL/L (ref 136–145)
WBC # BLD AUTO: 8.8 K/UL (ref 4–11)

## 2024-07-28 PROCEDURE — 2580000003 HC RX 258: Performed by: INTERNAL MEDICINE

## 2024-07-28 PROCEDURE — 80069 RENAL FUNCTION PANEL: CPT

## 2024-07-28 PROCEDURE — 94760 N-INVAS EAR/PLS OXIMETRY 1: CPT

## 2024-07-28 PROCEDURE — 6360000002 HC RX W HCPCS: Performed by: INTERNAL MEDICINE

## 2024-07-28 PROCEDURE — 83735 ASSAY OF MAGNESIUM: CPT

## 2024-07-28 PROCEDURE — 85025 COMPLETE CBC W/AUTO DIFF WBC: CPT

## 2024-07-28 PROCEDURE — 1200000000 HC SEMI PRIVATE

## 2024-07-28 PROCEDURE — 36415 COLL VENOUS BLD VENIPUNCTURE: CPT

## 2024-07-28 PROCEDURE — 6370000000 HC RX 637 (ALT 250 FOR IP): Performed by: INTERNAL MEDICINE

## 2024-07-28 RX ADMIN — OXYCODONE HYDROCHLORIDE AND ACETAMINOPHEN 1 TABLET: 5; 325 TABLET ORAL at 15:26

## 2024-07-28 RX ADMIN — FUROSEMIDE 30 MG: 20 TABLET ORAL at 07:58

## 2024-07-28 RX ADMIN — METRONIDAZOLE 500 MG: 500 INJECTION, SOLUTION INTRAVENOUS at 22:16

## 2024-07-28 RX ADMIN — OXYCODONE HYDROCHLORIDE AND ACETAMINOPHEN 1 TABLET: 5; 325 TABLET ORAL at 21:32

## 2024-07-28 RX ADMIN — ASPIRIN 81 MG: 81 TABLET, CHEWABLE ORAL at 07:57

## 2024-07-28 RX ADMIN — SPIRONOLACTONE 25 MG: 25 TABLET ORAL at 07:57

## 2024-07-28 RX ADMIN — INSULIN GLARGINE 45 UNITS: 100 INJECTION, SOLUTION SUBCUTANEOUS at 21:31

## 2024-07-28 RX ADMIN — INSULIN LISPRO 15 UNITS: 100 INJECTION, SOLUTION INTRAVENOUS; SUBCUTANEOUS at 07:56

## 2024-07-28 RX ADMIN — OXYBUTYNIN CHLORIDE 10 MG: 10 TABLET, EXTENDED RELEASE ORAL at 07:57

## 2024-07-28 RX ADMIN — OXYCODONE HYDROCHLORIDE AND ACETAMINOPHEN 1 TABLET: 5; 325 TABLET ORAL at 07:57

## 2024-07-28 RX ADMIN — ATENOLOL 25 MG: 25 TABLET ORAL at 07:57

## 2024-07-28 RX ADMIN — ATORVASTATIN CALCIUM 80 MG: 80 TABLET, FILM COATED ORAL at 21:32

## 2024-07-28 RX ADMIN — Medication: at 21:33

## 2024-07-28 RX ADMIN — INSULIN LISPRO 1 UNITS: 100 INJECTION, SOLUTION INTRAVENOUS; SUBCUTANEOUS at 07:56

## 2024-07-28 RX ADMIN — INSULIN LISPRO 15 UNITS: 100 INJECTION, SOLUTION INTRAVENOUS; SUBCUTANEOUS at 16:54

## 2024-07-28 RX ADMIN — CETIRIZINE HYDROCHLORIDE 10 MG: 10 TABLET, FILM COATED ORAL at 07:58

## 2024-07-28 RX ADMIN — SODIUM CHLORIDE, PRESERVATIVE FREE 10 ML: 5 INJECTION INTRAVENOUS at 07:57

## 2024-07-28 RX ADMIN — BACLOFEN 10 MG: 10 TABLET ORAL at 07:58

## 2024-07-28 RX ADMIN — VANCOMYCIN 1250 MG: 1.75 INJECTION, SOLUTION INTRAVENOUS at 12:20

## 2024-07-28 RX ADMIN — GABAPENTIN 800 MG: 400 CAPSULE ORAL at 07:57

## 2024-07-28 RX ADMIN — BACLOFEN 10 MG: 10 TABLET ORAL at 21:32

## 2024-07-28 RX ADMIN — FAMOTIDINE 20 MG: 20 TABLET, FILM COATED ORAL at 21:32

## 2024-07-28 RX ADMIN — TRAZODONE HYDROCHLORIDE 50 MG: 50 TABLET ORAL at 21:33

## 2024-07-28 RX ADMIN — LOSARTAN POTASSIUM 25 MG: 25 TABLET, FILM COATED ORAL at 07:57

## 2024-07-28 RX ADMIN — METRONIDAZOLE 500 MG: 500 INJECTION, SOLUTION INTRAVENOUS at 05:45

## 2024-07-28 RX ADMIN — CEFEPIME 2000 MG: 2 INJECTION, POWDER, FOR SOLUTION INTRAVENOUS at 05:47

## 2024-07-28 RX ADMIN — METRONIDAZOLE 500 MG: 500 INJECTION, SOLUTION INTRAVENOUS at 15:26

## 2024-07-28 RX ADMIN — SODIUM CHLORIDE, PRESERVATIVE FREE 10 ML: 5 INJECTION INTRAVENOUS at 21:33

## 2024-07-28 RX ADMIN — INSULIN LISPRO 15 UNITS: 100 INJECTION, SOLUTION INTRAVENOUS; SUBCUTANEOUS at 12:18

## 2024-07-28 RX ADMIN — GABAPENTIN 800 MG: 400 CAPSULE ORAL at 14:04

## 2024-07-28 RX ADMIN — CEFEPIME 2000 MG: 2 INJECTION, POWDER, FOR SOLUTION INTRAVENOUS at 18:04

## 2024-07-28 RX ADMIN — OXYCODONE HYDROCHLORIDE AND ACETAMINOPHEN 500 MG: 500 TABLET ORAL at 07:57

## 2024-07-28 RX ADMIN — Medication: at 07:56

## 2024-07-28 RX ADMIN — GABAPENTIN 800 MG: 400 CAPSULE ORAL at 21:32

## 2024-07-28 RX ADMIN — VANCOMYCIN 1250 MG: 1.75 INJECTION, SOLUTION INTRAVENOUS at 23:53

## 2024-07-28 RX ADMIN — FUROSEMIDE 30 MG: 20 TABLET ORAL at 21:32

## 2024-07-28 ASSESSMENT — PAIN DESCRIPTION - DESCRIPTORS
DESCRIPTORS: ACHING;DISCOMFORT

## 2024-07-28 ASSESSMENT — PAIN SCALES - GENERAL
PAINLEVEL_OUTOF10: 9
PAINLEVEL_OUTOF10: 7

## 2024-07-28 ASSESSMENT — PAIN DESCRIPTION - ONSET
ONSET: ON-GOING
ONSET: ON-GOING

## 2024-07-28 ASSESSMENT — PAIN DESCRIPTION - PAIN TYPE
TYPE: CHRONIC PAIN
TYPE: CHRONIC PAIN

## 2024-07-28 ASSESSMENT — PAIN DESCRIPTION - FREQUENCY
FREQUENCY: CONTINUOUS
FREQUENCY: CONTINUOUS

## 2024-07-28 ASSESSMENT — PAIN DESCRIPTION - LOCATION
LOCATION: GENERALIZED
LOCATION: GENERALIZED
LOCATION: GENERALIZED;BACK
LOCATION: GENERALIZED;BACK

## 2024-07-28 ASSESSMENT — PAIN SCALES - WONG BAKER
WONGBAKER_NUMERICALRESPONSE: NO HURT
WONGBAKER_NUMERICALRESPONSE: NO HURT

## 2024-07-28 ASSESSMENT — PAIN DESCRIPTION - ORIENTATION
ORIENTATION: MID
ORIENTATION: MID

## 2024-07-28 ASSESSMENT — PAIN - FUNCTIONAL ASSESSMENT
PAIN_FUNCTIONAL_ASSESSMENT: PREVENTS OR INTERFERES SOME ACTIVE ACTIVITIES AND ADLS
PAIN_FUNCTIONAL_ASSESSMENT: PREVENTS OR INTERFERES SOME ACTIVE ACTIVITIES AND ADLS

## 2024-07-28 NOTE — PLAN OF CARE
Problem: Discharge Planning  Goal: Discharge to home or other facility with appropriate resources  7/28/2024 0857 by Shelbi Eastman RN  Outcome: Progressing  7/27/2024 1927 by Ivett Samuel RN  Outcome: Progressing  Flowsheets (Taken 7/27/2024 1927)  Discharge to home or other facility with appropriate resources:   Refer to discharge planning if patient needs post-hospital services based on physician order or complex needs related to functional status, cognitive ability or social support system   Identify discharge learning needs (meds, wound care, etc)   Identify barriers to discharge with patient and caregiver   Arrange for needed discharge resources and transportation as appropriate     Problem: Pain  Goal: Verbalizes/displays adequate comfort level or baseline comfort level  7/28/2024 0857 by Shelbi Eastman RN  Outcome: Progressing  7/27/2024 1927 by Ivett Samuel RN  Outcome: Progressing  Flowsheets (Taken 7/27/2024 1927)  Verbalizes/displays adequate comfort level or baseline comfort level:   Encourage patient to monitor pain and request assistance   Administer analgesics based on type and severity of pain and evaluate response   Consider cultural and social influences on pain and pain management   Assess pain using appropriate pain scale   Implement non-pharmacological measures as appropriate and evaluate response   Notify Licensed Independent Practitioner if interventions unsuccessful or patient reports new pain     Problem: Skin/Tissue Integrity  Goal: Absence of new skin breakdown  Description: 1.  Monitor for areas of redness and/or skin breakdown  2.  Assess vascular access sites hourly  3.  Every 4-6 hours minimum:  Change oxygen saturation probe site  4.  Every 4-6 hours:  If on nasal continuous positive airway pressure, respiratory therapy assess nares and determine need for appliance change or resting period.  7/28/2024 0857 by Shelbi Eastman RN  Outcome: Progressing  7/27/2024 1927 by

## 2024-07-28 NOTE — PLAN OF CARE
Patient continues to refuse turns. States he shifts himself in bed. Refusing moon boots stating they hurt his legs. Educated patient on importance of repositioning and elevating heels. Voiced understanding but continues to refuse. Will continue to monitor skin for worsening breakdown.

## 2024-07-28 NOTE — PROGRESS NOTES
Patient is alert and oriented x 4 in bed. Vital, assessment and daily care given. Education and care plan updated. Medication given as per order. Fall precaution initiated, call light within reach, bed in low position and wheels locked. Addressed current patient's need. Continue to monitor.    Electronically signed by Ivett Samuel RN on 7/27/2024 at 11:10 PM

## 2024-07-28 NOTE — PROGRESS NOTES
Clinical Pharmacy Note  Vancomycin Consult    César Saunders is a 59 y.o. male ordered vancomycin for cellulitis; consult received from Dr. Dhaliwal to manage therapy. Also receiving cefepime and metronidazole.    Allergies:  Ciprofloxacin, Ertapenem, and Pcn [penicillins]     Temp max:  Temp (24hrs), Av.7 °F (36.5 °C), Min:97.4 °F (36.3 °C), Max:98.2 °F (36.8 °C)      Recent Labs     24  1516 24  0420 24  0511   WBC 13.9* 8.3 9.3         Recent Labs     24  1516 24  0420 24  0510   BUN 9 10 11   CREATININE 0.6* 0.7* 0.7*           Intake/Output Summary (Last 24 hours) at 2024 2233  Last data filed at 2024 1655  Gross per 24 hour   Intake 1508.85 ml   Output 2300 ml   Net -791.15 ml         Culture Results:  pending    Ht Readings from Last 1 Encounters:   24 1.727 m (5' 8\")        Wt Readings from Last 1 Encounters:   24 96.6 kg (212 lb 15.4 oz)         Estimated Creatinine Clearance: 131 mL/min (A) (based on SCr of 0.7 mg/dL (L)).    Assessment:  Day # 3 of vancomycin.  Current regimen: 1250 mg every 12 hours  Vancomycin level: 15.1 mg/L  Predicted AUC: 517 (steady state)    Plan:  Continue current regimen.    Thank you for the consult.   Jeancarlos Mata, PharmD
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
## 2024-07-29 PROBLEM — S31.829A WOUND OF LEFT BUTTOCK: Status: ACTIVE | Noted: 2024-07-29

## 2024-07-29 PROBLEM — L89.153 SACRAL DECUBITUS ULCER, STAGE III (HCC): Status: ACTIVE | Noted: 2024-07-29

## 2024-07-29 PROBLEM — Z22.322 MRSA COLONIZATION: Status: ACTIVE | Noted: 2024-07-29

## 2024-07-29 PROBLEM — S31.819A BUTTOCK WOUND, RIGHT, INITIAL ENCOUNTER: Status: ACTIVE | Noted: 2024-07-29

## 2024-07-29 PROBLEM — R70.0 ELEVATED ERYTHROCYTE SEDIMENTATION RATE: Status: ACTIVE | Noted: 2024-07-29

## 2024-07-29 PROBLEM — R79.82 ELEVATED C-REACTIVE PROTEIN (CRP): Status: ACTIVE | Noted: 2024-07-29

## 2024-07-29 LAB
ALBUMIN SERPL-MCNC: 3 G/DL (ref 3.4–5)
ANION GAP SERPL CALCULATED.3IONS-SCNC: 10 MMOL/L (ref 3–16)
BASOPHILS # BLD: 0.1 K/UL (ref 0–0.2)
BASOPHILS NFR BLD: 1.1 %
BUN SERPL-MCNC: 9 MG/DL (ref 7–20)
CALCIUM SERPL-MCNC: 8.2 MG/DL (ref 8.3–10.6)
CHLORIDE SERPL-SCNC: 101 MMOL/L (ref 99–110)
CO2 SERPL-SCNC: 26 MMOL/L (ref 21–32)
CREAT SERPL-MCNC: <0.5 MG/DL (ref 0.9–1.3)
DEPRECATED RDW RBC AUTO: 16.7 % (ref 12.4–15.4)
EOSINOPHIL # BLD: 0.3 K/UL (ref 0–0.6)
EOSINOPHIL NFR BLD: 2.5 %
GFR SERPLBLD CREATININE-BSD FMLA CKD-EPI: >90 ML/MIN/{1.73_M2}
GLUCOSE BLD-MCNC: 152 MG/DL (ref 70–99)
GLUCOSE BLD-MCNC: 169 MG/DL (ref 70–99)
GLUCOSE BLD-MCNC: 169 MG/DL (ref 70–99)
GLUCOSE BLD-MCNC: 178 MG/DL (ref 70–99)
GLUCOSE SERPL-MCNC: 126 MG/DL (ref 70–99)
HCT VFR BLD AUTO: 35.9 % (ref 40.5–52.5)
HGB BLD-MCNC: 12.1 G/DL (ref 13.5–17.5)
LYMPHOCYTES # BLD: 2.3 K/UL (ref 1–5.1)
LYMPHOCYTES NFR BLD: 20.4 %
MAGNESIUM SERPL-MCNC: 2.1 MG/DL (ref 1.8–2.4)
MCH RBC QN AUTO: 29.1 PG (ref 26–34)
MCHC RBC AUTO-ENTMCNC: 33.6 G/DL (ref 31–36)
MCV RBC AUTO: 86.6 FL (ref 80–100)
MONOCYTES # BLD: 0.7 K/UL (ref 0–1.3)
MONOCYTES NFR BLD: 5.9 %
NEUTROPHILS # BLD: 8.1 K/UL (ref 1.7–7.7)
NEUTROPHILS NFR BLD: 70.1 %
PERFORMED ON: ABNORMAL
PHOSPHATE SERPL-MCNC: 3.1 MG/DL (ref 2.5–4.9)
PLATELET # BLD AUTO: 435 K/UL (ref 135–450)
PMV BLD AUTO: 7.9 FL (ref 5–10.5)
POTASSIUM SERPL-SCNC: 3.7 MMOL/L (ref 3.5–5.1)
RBC # BLD AUTO: 4.15 M/UL (ref 4.2–5.9)
SODIUM SERPL-SCNC: 137 MMOL/L (ref 136–145)
VANCOMYCIN TROUGH SERPL-MCNC: 13.5 UG/ML (ref 10–20)
WBC # BLD AUTO: 11.5 K/UL (ref 4–11)

## 2024-07-29 PROCEDURE — 85025 COMPLETE CBC W/AUTO DIFF WBC: CPT

## 2024-07-29 PROCEDURE — 36415 COLL VENOUS BLD VENIPUNCTURE: CPT

## 2024-07-29 PROCEDURE — 6370000000 HC RX 637 (ALT 250 FOR IP): Performed by: INTERNAL MEDICINE

## 2024-07-29 PROCEDURE — 1200000000 HC SEMI PRIVATE

## 2024-07-29 PROCEDURE — 6360000002 HC RX W HCPCS: Performed by: INTERNAL MEDICINE

## 2024-07-29 PROCEDURE — 94760 N-INVAS EAR/PLS OXIMETRY 1: CPT

## 2024-07-29 PROCEDURE — 83735 ASSAY OF MAGNESIUM: CPT

## 2024-07-29 PROCEDURE — 2580000003 HC RX 258: Performed by: INTERNAL MEDICINE

## 2024-07-29 PROCEDURE — 80202 ASSAY OF VANCOMYCIN: CPT

## 2024-07-29 PROCEDURE — 99223 1ST HOSP IP/OBS HIGH 75: CPT | Performed by: INTERNAL MEDICINE

## 2024-07-29 PROCEDURE — 80069 RENAL FUNCTION PANEL: CPT

## 2024-07-29 RX ADMIN — CEFEPIME 2000 MG: 2 INJECTION, POWDER, FOR SOLUTION INTRAVENOUS at 17:34

## 2024-07-29 RX ADMIN — METRONIDAZOLE 500 MG: 500 INJECTION, SOLUTION INTRAVENOUS at 22:48

## 2024-07-29 RX ADMIN — OXYCODONE HYDROCHLORIDE AND ACETAMINOPHEN 1 TABLET: 5; 325 TABLET ORAL at 15:23

## 2024-07-29 RX ADMIN — EZETIMIBE 10 MG: 10 TABLET ORAL at 09:09

## 2024-07-29 RX ADMIN — FUROSEMIDE 30 MG: 20 TABLET ORAL at 09:11

## 2024-07-29 RX ADMIN — ATORVASTATIN CALCIUM 80 MG: 80 TABLET, FILM COATED ORAL at 20:22

## 2024-07-29 RX ADMIN — OXYBUTYNIN CHLORIDE 10 MG: 10 TABLET, EXTENDED RELEASE ORAL at 09:10

## 2024-07-29 RX ADMIN — INSULIN LISPRO 15 UNITS: 100 INJECTION, SOLUTION INTRAVENOUS; SUBCUTANEOUS at 09:09

## 2024-07-29 RX ADMIN — INSULIN LISPRO 15 UNITS: 100 INJECTION, SOLUTION INTRAVENOUS; SUBCUTANEOUS at 12:54

## 2024-07-29 RX ADMIN — OXYCODONE HYDROCHLORIDE AND ACETAMINOPHEN 500 MG: 500 TABLET ORAL at 09:10

## 2024-07-29 RX ADMIN — FUROSEMIDE 30 MG: 20 TABLET ORAL at 20:22

## 2024-07-29 RX ADMIN — INSULIN GLARGINE 45 UNITS: 100 INJECTION, SOLUTION SUBCUTANEOUS at 20:22

## 2024-07-29 RX ADMIN — TRAZODONE HYDROCHLORIDE 50 MG: 50 TABLET ORAL at 20:22

## 2024-07-29 RX ADMIN — OXYCODONE HYDROCHLORIDE AND ACETAMINOPHEN 1 TABLET: 5; 325 TABLET ORAL at 20:21

## 2024-07-29 RX ADMIN — SPIRONOLACTONE 25 MG: 25 TABLET ORAL at 09:10

## 2024-07-29 RX ADMIN — METRONIDAZOLE 500 MG: 500 INJECTION, SOLUTION INTRAVENOUS at 06:32

## 2024-07-29 RX ADMIN — CETIRIZINE HYDROCHLORIDE 10 MG: 10 TABLET, FILM COATED ORAL at 09:10

## 2024-07-29 RX ADMIN — OXYCODONE HYDROCHLORIDE AND ACETAMINOPHEN 1 TABLET: 5; 325 TABLET ORAL at 09:11

## 2024-07-29 RX ADMIN — ATENOLOL 25 MG: 25 TABLET ORAL at 09:10

## 2024-07-29 RX ADMIN — Medication: at 20:25

## 2024-07-29 RX ADMIN — BACLOFEN 10 MG: 10 TABLET ORAL at 09:11

## 2024-07-29 RX ADMIN — Medication: at 09:08

## 2024-07-29 RX ADMIN — SODIUM CHLORIDE, PRESERVATIVE FREE 10 ML: 5 INJECTION INTRAVENOUS at 20:23

## 2024-07-29 RX ADMIN — ASPIRIN 81 MG: 81 TABLET, CHEWABLE ORAL at 09:11

## 2024-07-29 RX ADMIN — METRONIDAZOLE 500 MG: 500 INJECTION, SOLUTION INTRAVENOUS at 15:28

## 2024-07-29 RX ADMIN — VANCOMYCIN 1250 MG: 1.75 INJECTION, SOLUTION INTRAVENOUS at 12:54

## 2024-07-29 RX ADMIN — BACLOFEN 10 MG: 10 TABLET ORAL at 20:22

## 2024-07-29 RX ADMIN — SODIUM CHLORIDE, PRESERVATIVE FREE 10 ML: 5 INJECTION INTRAVENOUS at 09:09

## 2024-07-29 RX ADMIN — INSULIN LISPRO 15 UNITS: 100 INJECTION, SOLUTION INTRAVENOUS; SUBCUTANEOUS at 17:28

## 2024-07-29 RX ADMIN — CEFEPIME 2000 MG: 2 INJECTION, POWDER, FOR SOLUTION INTRAVENOUS at 06:32

## 2024-07-29 RX ADMIN — FAMOTIDINE 20 MG: 20 TABLET, FILM COATED ORAL at 20:22

## 2024-07-29 RX ADMIN — LOSARTAN POTASSIUM 25 MG: 25 TABLET, FILM COATED ORAL at 09:10

## 2024-07-29 RX ADMIN — GABAPENTIN 800 MG: 400 CAPSULE ORAL at 09:10

## 2024-07-29 RX ADMIN — GABAPENTIN 800 MG: 400 CAPSULE ORAL at 14:18

## 2024-07-29 RX ADMIN — GABAPENTIN 800 MG: 400 CAPSULE ORAL at 20:22

## 2024-07-29 ASSESSMENT — PAIN DESCRIPTION - FREQUENCY: FREQUENCY: CONTINUOUS

## 2024-07-29 ASSESSMENT — PAIN SCALES - GENERAL
PAINLEVEL_OUTOF10: 10
PAINLEVEL_OUTOF10: 4
PAINLEVEL_OUTOF10: 10
PAINLEVEL_OUTOF10: 10

## 2024-07-29 ASSESSMENT — PAIN DESCRIPTION - ORIENTATION
ORIENTATION: RIGHT;LEFT

## 2024-07-29 ASSESSMENT — PAIN DESCRIPTION - DESCRIPTORS
DESCRIPTORS: CRAMPING
DESCRIPTORS: ACHING;SHARP;SORE;DISCOMFORT

## 2024-07-29 ASSESSMENT — PAIN DESCRIPTION - PAIN TYPE: TYPE: CHRONIC PAIN

## 2024-07-29 ASSESSMENT — PAIN - FUNCTIONAL ASSESSMENT
PAIN_FUNCTIONAL_ASSESSMENT: PREVENTS OR INTERFERES WITH MANY ACTIVE NOT PASSIVE ACTIVITIES
PAIN_FUNCTIONAL_ASSESSMENT: PREVENTS OR INTERFERES WITH MANY ACTIVE NOT PASSIVE ACTIVITIES
PAIN_FUNCTIONAL_ASSESSMENT: PREVENTS OR INTERFERES SOME ACTIVE ACTIVITIES AND ADLS

## 2024-07-29 ASSESSMENT — PAIN SCALES - WONG BAKER
WONGBAKER_NUMERICALRESPONSE: HURTS WORST
WONGBAKER_NUMERICALRESPONSE: HURTS LITTLE MORE

## 2024-07-29 ASSESSMENT — PAIN DESCRIPTION - LOCATION
LOCATION: BACK;LEG

## 2024-07-29 ASSESSMENT — PAIN DESCRIPTION - ONSET: ONSET: ON-GOING

## 2024-07-29 NOTE — PLAN OF CARE
Problem: Discharge Planning  Goal: Discharge to home or other facility with appropriate resources  7/29/2024 0754 by Vanessa Argueta RN  Outcome: Progressing  Flowsheets (Taken 7/28/2024 2248 by Shy Pope, RN)  Discharge to home or other facility with appropriate resources:   Identify barriers to discharge with patient and caregiver   Arrange for needed discharge resources and transportation as appropriate   Arrange for interpreters to assist at discharge as needed   Identify discharge learning needs (meds, wound care, etc)   Refer to discharge planning if patient needs post-hospital services based on physician order or complex needs related to functional status, cognitive ability or social support system     Problem: Pain  Goal: Verbalizes/displays adequate comfort level or baseline comfort level  7/29/2024 0754 by Vaenssa Argueta RN  Outcome: Progressing  Flowsheets (Taken 7/29/2024 0754)  Verbalizes/displays adequate comfort level or baseline comfort level:   Encourage patient to monitor pain and request assistance   Administer analgesics based on type and severity of pain and evaluate response   Assess pain using appropriate pain scale   Implement non-pharmacological measures as appropriate and evaluate response   Notify Licensed Independent Practitioner if interventions unsuccessful or patient reports new pain     Problem: Skin/Tissue Integrity  Goal: Absence of new skin breakdown  Description: 1.  Monitor for areas of redness and/or skin breakdown  2.  Assess vascular access sites hourly  3.  Every 4-6 hours minimum:  Change oxygen saturation probe site  4.  Every 4-6 hours:  If on nasal continuous positive airway pressure, respiratory therapy assess nares and determine need for appliance change or resting period.  7/29/2024 0754 by aVnessa Argueta RN  Outcome: Progressing     Problem: Safety - Adult  Goal: Free from fall injury  7/29/2024 0754 by Vanessa Argueta, RN  Outcome:

## 2024-07-29 NOTE — PROGRESS NOTES
Pt with complaints of Suprapubic firmness. Nurse noted impacted. Assist with digital disimpaction. Resolved firm suprapubic region. Pt tolerated well. Will cont to monitor and assess

## 2024-07-29 NOTE — PROGRESS NOTES
Call received from [facility] stating patient is having ventricular bigeminy with PVCs. Patient denies chest pain, SOB, lightheadedness at this time. Perfect serve message sent to Ras Dhaliwal MD to make aware.   Electronically signed by Vanessa Argueta RN on 7/29/2024 at 8:05 AM

## 2024-07-29 NOTE — PROGRESS NOTES
Clinical Pharmacy Note  Vancomycin Consult    César Saunders is a 59 y.o. male ordered vancomycin for cellulitis; consult received from Dr. Dhaliwal to manage therapy. Also receiving cefepime and metronidazole.    Allergies:  Ciprofloxacin, Ertapenem, and Pcn [penicillins]     Temp max:  Temp (24hrs), Av.8 °F (36.6 °C), Min:97.5 °F (36.4 °C), Max:98 °F (36.7 °C)      Recent Labs     24  0511 24  0408 24  0405   WBC 9.3 8.8 11.5*         Recent Labs     24  0510 24  0408 24  0405   BUN 11 10 9   CREATININE 0.7* 0.7* <0.5*           Intake/Output Summary (Last 24 hours) at 2024 1305  Last data filed at 2024 1244  Gross per 24 hour   Intake 740 ml   Output 3700 ml   Net -2960 ml         Culture Results:  pending    Ht Readings from Last 1 Encounters:   24 1.727 m (5' 8\")        Wt Readings from Last 1 Encounters:   24 106.7 kg (235 lb 3.7 oz)         Estimated Creatinine Clearance: 193 mL/min (based on SCr of 0.5 mg/dL).    Assessment:  Day # 4 of vancomycin.  Current regimen: 1250 mg every 12 hours  Vancomycin level: 13.5  mg/L (10 hours post dose)  Predicted AUC: 483 (steady state)    Plan:  Continue current regimen.  Will continue to monitor SrCr and order level as indicated  Thank you for the consult.     Eduardo Mcnally [facility], 2024 1:07 PM

## 2024-07-29 NOTE — PROGRESS NOTES
Patient is alert & oriented x4, resting in bed, on room air. Gets up with maximove. IV abx infusing in R hand IV. 2/4 bed rails up, bed in lowest position, fall precautions in place, bed alarm on, call light within reach. Morning medications given as ordered, tolerated well. Patient denies further needs at this time. Will cont to monitor and reassess.  Electronically signed by Vanessa Argueta RN on 7/29/2024 at 12:06 PM

## 2024-07-29 NOTE — PROGRESS NOTES
V2.0    [facility] Progress Note      Name:  César Saunders /Age/Sex: 1965  (59 y.o. male)   MRN & CSN:  [id number] & [id number] Encounter Date/Time: 2024 9:44 AM EDT   Location:  [id number] PCP: Ulises Hager MD     Attending:Ras Dhaliwal MD       Hospital Day: 5    Assessment and Recommendations   César Saunders is a 59 y.o. male who presents with Cellulitis      Plan:     Cellulitis with bilateral buttocks ulcers and wounds, vancomycin, Flagyl and cefepime General surgery consulted no plan for surgical intervention.  White count improved from 13.9 on admission down to 11.5 today CRP elevated on admission will repeat in a.m.  Diabetes mellitus sliding scale, preprandial and Lantus, diabetic diet, blood sugar 126 this morning  Paraplegia PT OT  Essential hypertension p.o. medication  History of VTE not on anticoagulation at this time  Bigeminy on telemetry strips completely asymptomatic follow-up with cardiology as outpatient potassium noted at 3.7 magnesium noted at 2.1      Diet ADULT DIET; Regular; 4 carb choices (60 gm/meal)   DVT Prophylaxis [] Lovenox, []  Heparin, [] SCDs, [] Ambulation,  [] Eliquis, [] Xarelto  [] Coumadin   Code Status Full Code             Personally reviewed Lab Studies and Imaging     Discussed management of the case with infectious disease  Telemetry strip reviewed by myself bigeminzana      Drugs that require monitoring for toxicity include vancomycin and the method of monitoring was creatinine    Medical Decision Making:  The following items were considered in medical decision making:  Discussion of patient care with other providers  Reviewed clinical lab tests  Reviewed radiology tests  Reviewed other diagnostic tests/interventions  Independent review of radiologic images  Microbiology cultures and other micro tests reviewed      Subjective:     Chief Complaint: Buttocks ulcers    César Saunders is a 59 y.o. male who presents with buttocks ulcers and  density nearly to bone overlying the distal sacrum, but no definite findings of osteomyelitis are identified.  No findings to suggest abscess or necrotizing fasciitis. 2. Trace gas is present within the urinary bladder lumen with no other findings suggestive of cystitis.  Correlate for recent instrumentation.       CBC:   Recent Labs     07/27/24  0511 07/28/24  0408 07/29/24  0405   WBC 9.3 8.8 11.5*   HGB 11.0* 11.6* 12.1*    436 435     BMP:    Recent Labs     07/27/24  0510 07/28/24  0408 07/29/24  0405    137 137   K 4.4 4.0 3.7    98* 101   CO2 28 29 26   BUN 11 10 9   CREATININE 0.7* 0.7* <0.5*   GLUCOSE 217* 208* 126*     Hepatic:   Recent Labs     07/27/24  0510   AST 11*   ALT 10   BILITOT <0.2   ALKPHOS 151*     Lipids:   Lab Results   Component Value Date/Time    CHOL 165 12/28/2018 05:17 AM    HDL 34 12/28/2018 05:17 AM    TRIG 171 12/28/2018 05:17 AM     Hemoglobin A1C:   Lab Results   Component Value Date/Time    LABA1C 8.6 07/25/2024 03:16 PM     TSH:   Lab Results   Component Value Date/Time    TSH 2.91 04/29/2011 01:10 PM     Troponin: No results found for: \"TROPONINT\"  Lactic Acid: No results for input(s): \"LACTA\" in the last 72 hours.  BNP: No results for input(s): \"PROBNP\" in the last 72 hours.  UA:  Lab Results   Component Value Date/Time    NITRU Negative 03/26/2020 08:38 PM    COLORU DK YELLOW 03/26/2020 08:38 PM    PHUR 5.5 03/26/2020 08:38 PM    WBCUA 36 03/26/2020 08:38 PM    RBCUA 1 03/26/2020 08:38 PM    MUCUS 1+ 03/26/2020 08:38 PM    CLARITYU Clear 03/26/2020 08:38 PM    LEUKOCYTESUR SMALL 03/26/2020 08:38 PM    UROBILINOGEN 1.0 03/26/2020 08:38 PM    BILIRUBINUR SMALL 03/26/2020 08:38 PM    BLOODU Negative 03/26/2020 08:38 PM    GLUCOSEU 250 03/26/2020 08:38 PM    KETUA Negative 03/26/2020 08:38 PM     Urine Cultures:   Lab Results   Component Value Date/Time    LABURIN  03/26/2020 08:38 PM     <10,000 CFU/ml mixed skin/urogenital luis miguel. No further workup     Blood

## 2024-07-29 NOTE — PROGRESS NOTES
Physician Progress Note      PATIENT:               KRISTYN CARR  [facility] #:                  [id number]  :                       1965  ADMIT DATE:       2024 2:01 PM  DISCH DATE:  RESPONDING  PROVIDER #:        Hernan Nguyễn MD          QUERY TEXT:    Pt admitted with B/L buttock cellulitis. Pt noted to have elevated WBC, Sed   rate, CRP and low temperature. If possible, please document in the progress   notes and discharge summary if you are evaluating and /or treating any of the   following:  The medical record reflects the following:  Risk Factors: chronic stage 3 decubitus ulcers, DM  Clinical Indicators: -WBC=13.9, Sed rate=92, CRP=75.8, Temp.=34.3; MD   notes  \"59-year-old patient admitted to the  hospital with bilateral buttock wounds ulcers likely infected started on IV   antibiotics General surgery consulted\"  Treatment: CBC, CMP, Blood cultures, General surgery consult, Meds-IV   Cefepime, IV Flagyl, IV Vancomycin  Options provided:  -- Sepsis due to Cellulitis with bilateral buttocks ulcers, present on   admission  -- Other - I will add my own diagnosis  -- Disagree - Not applicable / Not valid  -- Disagree - Clinically unable to determine / Unknown  -- Refer to Clinical Documentation Reviewer    PROVIDER RESPONSE TEXT:    Sepsis due to Cellulitis with bilateral buttocks ulcers, present on admission    Query created by: Arianna Hercules on 2024 9:55 PM      Electronically signed by:  Hernan Nguyễn MD 2024 8:21 AM
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
## 2024-07-29 NOTE — CARE COORDINATION
Spoke with patient regarding possible snf placement. He is in agreement with snf stay at [facility] if needed. Left message for Tori at [facility] regarding above. Patient has [place] Medicaid. The facility may need to request a skilled level of care.     Electronically signed by POOJA Murdock, LISW, Case Management on 7/29/2024 at 2:46 PM  [place] [phone]    3:28 PM  Spoke with Tori at [facility]. They can accept patient on the skilled unit.   Patient would be a new admit and would need a 7000 hens. Explained that per COA, no level of care is needed with a 7000 hens.  Explained that the facility would need to follow up and do the continued review stay if needed. Tori reported that she would share the above with her Sw.    Tori reported that [facility] would be able to transport if transport is still available on day of discharge.    Electronically signed by POOJA Murdock, ROSIEW, Case Management on 7/29/2024 at 3:32 PM  [place] [phone]

## 2024-07-29 NOTE — PLAN OF CARE
Problem: Discharge Planning  Goal: Discharge to home or other facility with appropriate resources  Outcome: Progressing  Flowsheets (Taken 7/28/2024 7536)  Discharge to home or other facility with appropriate resources:   Identify barriers to discharge with patient and caregiver   Arrange for needed discharge resources and transportation as appropriate   Arrange for interpreters to assist at discharge as needed   Identify discharge learning needs (meds, wound care, etc)   Refer to discharge planning if patient needs post-hospital services based on physician order or complex needs related to functional status, cognitive ability or social support system     Problem: Pain  Goal: Verbalizes/displays adequate comfort level or baseline comfort level  Outcome: Progressing  Flowsheets (Taken 7/27/2024 1927 by Ivett Samuel, RN)  Verbalizes/displays adequate comfort level or baseline comfort level:   Encourage patient to monitor pain and request assistance   Administer analgesics based on type and severity of pain and evaluate response   Consider cultural and social influences on pain and pain management   Assess pain using appropriate pain scale   Implement non-pharmacological measures as appropriate and evaluate response   Notify Licensed Independent Practitioner if interventions unsuccessful or patient reports new pain     Problem: Skin/Tissue Integrity  Goal: Absence of new skin breakdown  Description: 1.  Monitor for areas of redness and/or skin breakdown  2.  Assess vascular access sites hourly  3.  Every 4-6 hours minimum:  Change oxygen saturation probe site  4.  Every 4-6 hours:  If on nasal continuous positive airway pressure, respiratory therapy assess nares and determine need for appliance change or resting period.  Outcome: Progressing  Note: Murphy score assessed. Educated patient on importance of repositioning to prevent skin issues.        Problem: Safety - Adult  Goal: Free from fall injury  Outcome:

## 2024-07-29 NOTE — PROGRESS NOTES
Offered to turn and reposition several times, pt refused. Education and encouragement preformed with no success. Will cont to monitor

## 2024-07-30 ENCOUNTER — APPOINTMENT (OUTPATIENT)
Age: 59
End: 2024-07-30
Payer: MEDICAID

## 2024-07-30 LAB
ALBUMIN SERPL-MCNC: 3.3 G/DL (ref 3.4–5)
ALBUMIN/GLOB SERPL: 0.9 {RATIO} (ref 1.1–2.2)
ALP SERPL-CCNC: 135 U/L (ref 40–129)
ALT SERPL-CCNC: 14 U/L (ref 10–40)
ANION GAP SERPL CALCULATED.3IONS-SCNC: 11 MMOL/L (ref 3–16)
AST SERPL-CCNC: 16 U/L (ref 15–37)
BACTERIA BLD CULT ORG #2: NORMAL
BACTERIA BLD CULT: NORMAL
BASOPHILS # BLD: 0.1 K/UL (ref 0–0.2)
BASOPHILS NFR BLD: 0.9 %
BILIRUB SERPL-MCNC: 0.3 MG/DL (ref 0–1)
BUN SERPL-MCNC: 9 MG/DL (ref 7–20)
CALCIUM SERPL-MCNC: 8.3 MG/DL (ref 8.3–10.6)
CHLORIDE SERPL-SCNC: 101 MMOL/L (ref 99–110)
CO2 SERPL-SCNC: 25 MMOL/L (ref 21–32)
CREAT SERPL-MCNC: 0.5 MG/DL (ref 0.9–1.3)
CRP SERPL-MCNC: 18.1 MG/L (ref 0–5.1)
DEPRECATED RDW RBC AUTO: 17.1 % (ref 12.4–15.4)
ECHO AO ASC DIAM: 3.6 CM
ECHO AO ASCENDING AORTA INDEX: 1.73 CM/M2
ECHO AO ROOT DIAM: 4.4 CM
ECHO AO ROOT INDEX: 2.12 CM/M2
ECHO AV AREA PEAK VELOCITY: 3.5 CM2
ECHO AV AREA VTI: 3.8 CM2
ECHO AV AREA/BSA PEAK VELOCITY: 1.7 CM2/M2
ECHO AV AREA/BSA VTI: 1.8 CM2/M2
ECHO AV MEAN GRADIENT: 1 MMHG
ECHO AV MEAN VELOCITY: 0.5 M/S
ECHO AV PEAK GRADIENT: 2 MMHG
ECHO AV PEAK VELOCITY: 0.7 M/S
ECHO AV VELOCITY RATIO: 0.86
ECHO AV VTI: 14 CM
ECHO BSA: 2.13 M2
ECHO EST RA PRESSURE: 3 MMHG
ECHO LA AREA 2C: 13.7 CM2
ECHO LA AREA 4C: 19.7 CM2
ECHO LA MAJOR AXIS: 7.5 CM
ECHO LA MINOR AXIS: 4.9 CM
ECHO LA VOL MOD A2C: 28 ML (ref 18–58)
ECHO LA VOL MOD A4C: 40 ML (ref 18–58)
ECHO LA VOLUME INDEX MOD A2C: 13 ML/M2 (ref 16–34)
ECHO LA VOLUME INDEX MOD A4C: 19 ML/M2 (ref 16–34)
ECHO LV E' LATERAL VELOCITY: 10 CM/S
ECHO LV E' SEPTAL VELOCITY: 7 CM/S
ECHO LV EDV A2C: 72 ML
ECHO LV EDV A4C: 96 ML
ECHO LV EDV INDEX A4C: 46 ML/M2
ECHO LV EDV NDEX A2C: 35 ML/M2
ECHO LV EJECTION FRACTION A2C: 60 %
ECHO LV EJECTION FRACTION A4C: 48 %
ECHO LV EJECTION FRACTION BIPLANE: 56 % (ref 55–100)
ECHO LV ESV A2C: 28 ML
ECHO LV ESV A4C: 50 ML
ECHO LV ESV INDEX A2C: 13 ML/M2
ECHO LV ESV INDEX A4C: 24 ML/M2
ECHO LV FRACTIONAL SHORTENING: 36 % (ref 28–44)
ECHO LV INTERNAL DIMENSION DIASTOLE INDEX: 2.69 CM/M2
ECHO LV INTERNAL DIMENSION DIASTOLIC: 5.6 CM (ref 4.2–5.9)
ECHO LV INTERNAL DIMENSION SYSTOLIC INDEX: 1.73 CM/M2
ECHO LV INTERNAL DIMENSION SYSTOLIC: 3.6 CM
ECHO LV IVSD: 0.8 CM (ref 0.6–1)
ECHO LV MASS 2D: 178.1 G (ref 88–224)
ECHO LV MASS INDEX 2D: 85.6 G/M2 (ref 49–115)
ECHO LV POSTERIOR WALL DIASTOLIC: 0.9 CM (ref 0.6–1)
ECHO LV RELATIVE WALL THICKNESS RATIO: 0.32
ECHO LVOT AREA: 3.8 CM2
ECHO LVOT AV VTI INDEX: 0.96
ECHO LVOT DIAM: 2.2 CM
ECHO LVOT MEAN GRADIENT: 1 MMHG
ECHO LVOT PEAK GRADIENT: 2 MMHG
ECHO LVOT PEAK VELOCITY: 0.6 M/S
ECHO LVOT STROKE VOLUME INDEX: 24.5 ML/M2
ECHO LVOT SV: 50.9 ML
ECHO LVOT VTI: 13.4 CM
ECHO MV A VELOCITY: 0.55 M/S
ECHO MV AREA VTI: 2.7 CM2
ECHO MV E DECELERATION TIME (DT): 230 MS
ECHO MV E VELOCITY: 0.44 M/S
ECHO MV E/A RATIO: 0.8
ECHO MV E/E' LATERAL: 4.4
ECHO MV E/E' RATIO (AVERAGED): 5.34
ECHO MV E/E' SEPTAL: 6.29
ECHO MV LVOT VTI INDEX: 1.4
ECHO MV MAX VELOCITY: 0.6 M/S
ECHO MV MEAN GRADIENT: 0 MMHG
ECHO MV MEAN VELOCITY: 0.3 M/S
ECHO MV PEAK GRADIENT: 1 MMHG
ECHO MV VTI: 18.8 CM
ECHO PV MAX VELOCITY: 0.9 M/S
ECHO PV PEAK GRADIENT: 3 MMHG
ECHO RA AREA 4C: 12.4 CM2
ECHO RA END SYSTOLIC VOLUME APICAL 4 CHAMBER INDEX BSA: 10 ML/M2
ECHO RA VOLUME: 21 ML
ECHO RIGHT VENTRICULAR SYSTOLIC PRESSURE (RVSP): 8 MMHG
ECHO RV BASAL DIMENSION: 2.3 CM
ECHO RV FREE WALL PEAK S': 16 CM/S
ECHO RV MID DIMENSION: 2.3 CM
ECHO RV TAPSE: 1.9 CM (ref 1.7–?)
ECHO TV REGURGITANT MAX VELOCITY: 1.07 M/S
ECHO TV REGURGITANT PEAK GRADIENT: 5 MMHG
EKG ATRIAL RATE: 76 BPM
EKG DIAGNOSIS: NORMAL
EKG P AXIS: 28 DEGREES
EKG P-R INTERVAL: 150 MS
EKG Q-T INTERVAL: 388 MS
EKG QRS DURATION: 94 MS
EKG QTC CALCULATION (BAZETT): 436 MS
EKG R AXIS: 44 DEGREES
EKG T AXIS: 41 DEGREES
EKG VENTRICULAR RATE: 76 BPM
EOSINOPHIL # BLD: 0.3 K/UL (ref 0–0.6)
EOSINOPHIL NFR BLD: 3.1 %
GFR SERPLBLD CREATININE-BSD FMLA CKD-EPI: >90 ML/MIN/{1.73_M2}
GLUCOSE BLD-MCNC: 136 MG/DL (ref 70–99)
GLUCOSE BLD-MCNC: 158 MG/DL (ref 70–99)
GLUCOSE BLD-MCNC: 184 MG/DL (ref 70–99)
GLUCOSE BLD-MCNC: 90 MG/DL (ref 70–99)
GLUCOSE SERPL-MCNC: 130 MG/DL (ref 70–99)
HCT VFR BLD AUTO: 35.7 % (ref 40.5–52.5)
HGB BLD-MCNC: 11.7 G/DL (ref 13.5–17.5)
LYMPHOCYTES # BLD: 1.6 K/UL (ref 1–5.1)
LYMPHOCYTES NFR BLD: 17.5 %
MAGNESIUM SERPL-MCNC: 2.1 MG/DL (ref 1.8–2.4)
MCH RBC QN AUTO: 28.4 PG (ref 26–34)
MCHC RBC AUTO-ENTMCNC: 32.7 G/DL (ref 31–36)
MCV RBC AUTO: 86.7 FL (ref 80–100)
MONOCYTES # BLD: 0.7 K/UL (ref 0–1.3)
MONOCYTES NFR BLD: 7.6 %
NEUTROPHILS # BLD: 6.6 K/UL (ref 1.7–7.7)
NEUTROPHILS NFR BLD: 70.9 %
PERFORMED ON: ABNORMAL
PERFORMED ON: NORMAL
PLATELET # BLD AUTO: 448 K/UL (ref 135–450)
PMV BLD AUTO: 8.5 FL (ref 5–10.5)
POTASSIUM SERPL-SCNC: 3.6 MMOL/L (ref 3.5–5.1)
PROT SERPL-MCNC: 6.9 G/DL (ref 6.4–8.2)
RBC # BLD AUTO: 4.12 M/UL (ref 4.2–5.9)
SODIUM SERPL-SCNC: 137 MMOL/L (ref 136–145)
TROPONIN, HIGH SENSITIVITY: 19 NG/L (ref 0–22)
WBC # BLD AUTO: 9.3 K/UL (ref 4–11)

## 2024-07-30 PROCEDURE — 84484 ASSAY OF TROPONIN QUANT: CPT

## 2024-07-30 PROCEDURE — 1200000000 HC SEMI PRIVATE

## 2024-07-30 PROCEDURE — 85025 COMPLETE CBC W/AUTO DIFF WBC: CPT

## 2024-07-30 PROCEDURE — 6370000000 HC RX 637 (ALT 250 FOR IP): Performed by: INTERNAL MEDICINE

## 2024-07-30 PROCEDURE — 93306 TTE W/DOPPLER COMPLETE: CPT | Performed by: INTERNAL MEDICINE

## 2024-07-30 PROCEDURE — 94760 N-INVAS EAR/PLS OXIMETRY 1: CPT

## 2024-07-30 PROCEDURE — 83735 ASSAY OF MAGNESIUM: CPT

## 2024-07-30 PROCEDURE — 86140 C-REACTIVE PROTEIN: CPT

## 2024-07-30 PROCEDURE — 93010 ELECTROCARDIOGRAM REPORT: CPT | Performed by: INTERNAL MEDICINE

## 2024-07-30 PROCEDURE — 2580000003 HC RX 258: Performed by: INTERNAL MEDICINE

## 2024-07-30 PROCEDURE — 6360000002 HC RX W HCPCS: Performed by: INTERNAL MEDICINE

## 2024-07-30 PROCEDURE — 99232 SBSQ HOSP IP/OBS MODERATE 35: CPT | Performed by: INTERNAL MEDICINE

## 2024-07-30 PROCEDURE — 36415 COLL VENOUS BLD VENIPUNCTURE: CPT

## 2024-07-30 PROCEDURE — 93005 ELECTROCARDIOGRAM TRACING: CPT

## 2024-07-30 PROCEDURE — 99222 1ST HOSP IP/OBS MODERATE 55: CPT

## 2024-07-30 PROCEDURE — 80053 COMPREHEN METABOLIC PANEL: CPT

## 2024-07-30 PROCEDURE — 93306 TTE W/DOPPLER COMPLETE: CPT

## 2024-07-30 RX ORDER — METRONIDAZOLE 500 MG/1
500 TABLET ORAL EVERY 8 HOURS SCHEDULED
Status: DISCONTINUED | OUTPATIENT
Start: 2024-07-30 | End: 2024-08-01 | Stop reason: HOSPADM

## 2024-07-30 RX ADMIN — SODIUM CHLORIDE, PRESERVATIVE FREE 10 ML: 5 INJECTION INTRAVENOUS at 09:03

## 2024-07-30 RX ADMIN — ATORVASTATIN CALCIUM 80 MG: 80 TABLET, FILM COATED ORAL at 20:18

## 2024-07-30 RX ADMIN — FAMOTIDINE 20 MG: 20 TABLET, FILM COATED ORAL at 20:18

## 2024-07-30 RX ADMIN — OXYCODONE HYDROCHLORIDE AND ACETAMINOPHEN 1 TABLET: 5; 325 TABLET ORAL at 15:41

## 2024-07-30 RX ADMIN — OXYBUTYNIN CHLORIDE 10 MG: 10 TABLET, EXTENDED RELEASE ORAL at 09:03

## 2024-07-30 RX ADMIN — GABAPENTIN 800 MG: 400 CAPSULE ORAL at 14:21

## 2024-07-30 RX ADMIN — FUROSEMIDE 30 MG: 20 TABLET ORAL at 09:02

## 2024-07-30 RX ADMIN — METRONIDAZOLE 500 MG: 500 INJECTION, SOLUTION INTRAVENOUS at 05:58

## 2024-07-30 RX ADMIN — CEFEPIME 2000 MG: 2 INJECTION, POWDER, FOR SOLUTION INTRAVENOUS at 07:26

## 2024-07-30 RX ADMIN — VANCOMYCIN 1250 MG: 1.75 INJECTION, SOLUTION INTRAVENOUS at 00:06

## 2024-07-30 RX ADMIN — SODIUM CHLORIDE, PRESERVATIVE FREE 10 ML: 5 INJECTION INTRAVENOUS at 20:19

## 2024-07-30 RX ADMIN — METRONIDAZOLE 500 MG: 500 TABLET ORAL at 14:21

## 2024-07-30 RX ADMIN — INSULIN LISPRO 15 UNITS: 100 INJECTION, SOLUTION INTRAVENOUS; SUBCUTANEOUS at 12:12

## 2024-07-30 RX ADMIN — ATENOLOL 25 MG: 25 TABLET ORAL at 09:03

## 2024-07-30 RX ADMIN — LOSARTAN POTASSIUM 25 MG: 25 TABLET, FILM COATED ORAL at 09:03

## 2024-07-30 RX ADMIN — CEFEPIME 2000 MG: 2 INJECTION, POWDER, FOR SOLUTION INTRAVENOUS at 17:38

## 2024-07-30 RX ADMIN — BACLOFEN 10 MG: 10 TABLET ORAL at 20:19

## 2024-07-30 RX ADMIN — BACLOFEN 10 MG: 10 TABLET ORAL at 09:02

## 2024-07-30 RX ADMIN — GABAPENTIN 800 MG: 400 CAPSULE ORAL at 09:02

## 2024-07-30 RX ADMIN — OXYCODONE HYDROCHLORIDE AND ACETAMINOPHEN 1 TABLET: 5; 325 TABLET ORAL at 20:18

## 2024-07-30 RX ADMIN — METRONIDAZOLE 500 MG: 500 TABLET ORAL at 20:19

## 2024-07-30 RX ADMIN — INSULIN LISPRO 15 UNITS: 100 INJECTION, SOLUTION INTRAVENOUS; SUBCUTANEOUS at 09:02

## 2024-07-30 RX ADMIN — OXYCODONE HYDROCHLORIDE AND ACETAMINOPHEN 500 MG: 500 TABLET ORAL at 09:02

## 2024-07-30 RX ADMIN — ASPIRIN 81 MG: 81 TABLET, CHEWABLE ORAL at 09:03

## 2024-07-30 RX ADMIN — VANCOMYCIN 1250 MG: 1.75 INJECTION, SOLUTION INTRAVENOUS at 23:41

## 2024-07-30 RX ADMIN — EZETIMIBE 10 MG: 10 TABLET ORAL at 09:02

## 2024-07-30 RX ADMIN — TRAZODONE HYDROCHLORIDE 50 MG: 50 TABLET ORAL at 20:19

## 2024-07-30 RX ADMIN — Medication: at 09:03

## 2024-07-30 RX ADMIN — OXYCODONE HYDROCHLORIDE AND ACETAMINOPHEN 1 TABLET: 5; 325 TABLET ORAL at 09:02

## 2024-07-30 RX ADMIN — FUROSEMIDE 30 MG: 20 TABLET ORAL at 20:18

## 2024-07-30 RX ADMIN — Medication: at 20:20

## 2024-07-30 RX ADMIN — CETIRIZINE HYDROCHLORIDE 10 MG: 10 TABLET, FILM COATED ORAL at 09:02

## 2024-07-30 RX ADMIN — GABAPENTIN 800 MG: 400 CAPSULE ORAL at 20:17

## 2024-07-30 RX ADMIN — INSULIN GLARGINE 45 UNITS: 100 INJECTION, SOLUTION SUBCUTANEOUS at 20:21

## 2024-07-30 RX ADMIN — VANCOMYCIN 1250 MG: 1.75 INJECTION, SOLUTION INTRAVENOUS at 12:20

## 2024-07-30 RX ADMIN — SPIRONOLACTONE 25 MG: 25 TABLET ORAL at 09:02

## 2024-07-30 ASSESSMENT — PAIN SCALES - GENERAL
PAINLEVEL_OUTOF10: 0
PAINLEVEL_OUTOF10: 6
PAINLEVEL_OUTOF10: 10
PAINLEVEL_OUTOF10: 9

## 2024-07-30 ASSESSMENT — PAIN SCALES - WONG BAKER: WONGBAKER_NUMERICALRESPONSE: NO HURT

## 2024-07-30 ASSESSMENT — PAIN DESCRIPTION - LOCATION
LOCATION: BACK;LEG
LOCATION: LEG;BACK

## 2024-07-30 ASSESSMENT — PAIN DESCRIPTION - DESCRIPTORS
DESCRIPTORS: ACHING;SORE;DISCOMFORT
DESCRIPTORS: ACHING;SHARP

## 2024-07-30 ASSESSMENT — PAIN DESCRIPTION - ORIENTATION
ORIENTATION: RIGHT;LEFT
ORIENTATION: RIGHT;LEFT

## 2024-07-30 ASSESSMENT — PAIN DESCRIPTION - FREQUENCY: FREQUENCY: CONTINUOUS

## 2024-07-30 ASSESSMENT — PAIN - FUNCTIONAL ASSESSMENT
PAIN_FUNCTIONAL_ASSESSMENT: PREVENTS OR INTERFERES WITH MANY ACTIVE NOT PASSIVE ACTIVITIES
PAIN_FUNCTIONAL_ASSESSMENT: PREVENTS OR INTERFERES WITH MANY ACTIVE NOT PASSIVE ACTIVITIES

## 2024-07-30 ASSESSMENT — PAIN DESCRIPTION - PAIN TYPE: TYPE: CHRONIC PAIN

## 2024-07-30 ASSESSMENT — PAIN DESCRIPTION - ONSET: ONSET: ON-GOING

## 2024-07-30 NOTE — PROGRESS NOTES
Patient is alert and awake on bed. Vital, assessment and daily care given. Education and care plan updated. Medication given as per order. Fall precaution initiated, call light within reach, bed in low position and wheels locked. Patient denied any other need at this time. Continue to monitor.    Electronically signed by Ivett Samuel RN on 7/30/2024 at 1:58 AM     clear
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
## 2024-07-30 NOTE — CARE COORDINATION
[facility] Nurse  Follow-up Progress Note       NAME:  César Saunders  MEDICAL RECORD NUMBER:  [id number]  AGE:  59 y.o.   GENDER:  male  :  1965  TODAY'S DATE:  2024    Subjective:     Pt seen for follow-up. Tells me he is supposed to go home tomorrow.     Wound Identification:  Wound Type: pressure  Contributing Factors: chronic pressure, decreased mobility, and shear force, incontinence of urine and stool        Patient Goal of Care:  [x] Wound Healing  [] Odor Control  [] Palliative Care  [] Pain Control   [] Other:     Objective:    /74   Pulse 79   Temp 97.8 °F (36.6 °C) (Oral)   Resp 16   Ht 1.727 m (5' 7.99\")   Wt 95 kg (209 lb 7 oz)   SpO2 92%   BMI 31.85 kg/m²   Murphy Risk Score: Murphy Scale Score: 15  Assessment:   Measurements:        Wound 20 Sacrum (Active)   Wound Image   24 1230   Wound Etiology Pressure Stage 3 24 1243   Dressing Status Clean;Dry;Intact 24 1243   Wound Cleansed Other (Comment) 24 1243   Dressing/Treatment Triad hydro/zinc oxide-based hydrophilic paste 24 1243   Wound Length (cm) 10 cm 24 1230   Wound Width (cm) 5 cm 24 1245   Wound Depth (cm) 0.3 cm 24 1245   Wound Surface Area (cm^2) 50 cm^2 24 1245   Change in Wound Size % (l*w) -4900 24 1245   Wound Volume (cm^3) 15 cm^3 24 1245   Wound Healing % -62330 24 1245   Wound Assessment Erythema;Pink/red;Bleeding;Non-blanchable erythema;Subcutaneous 24 1243   Drainage Amount Small (< 25%) 24 1243   Drainage Description Sanguinous 24 1243   Odor None 24 1243   Angelica-wound Assessment Non-blanchable erythema;Hyperpigmented 24 1243   Margins Defined edges 24 1243   Wound Thickness Description not for Pressure Injury Full thickness 24 1243   Number of days: 1586     Pt seen. BLE with less dry scales using amlactin.  Sacral wound improving. Right

## 2024-07-30 NOTE — PLAN OF CARE
Problem: Discharge Planning  Goal: Discharge to home or other facility with appropriate resources  7/30/2024 1953 by Ivett Samuel RN  Outcome: Progressing  Flowsheets (Taken 7/30/2024 1953)  Discharge to home or other facility with appropriate resources:   Refer to discharge planning if patient needs post-hospital services based on physician order or complex needs related to functional status, cognitive ability or social support system   Identify discharge learning needs (meds, wound care, etc)   Identify barriers to discharge with patient and caregiver   Arrange for needed discharge resources and transportation as appropriate     Problem: Pain  Goal: Verbalizes/displays adequate comfort level or baseline comfort level  7/30/2024 1953 by Ivett Samuel RN  Outcome: Progressing  Flowsheets (Taken 7/30/2024 1953)  Verbalizes/displays adequate comfort level or baseline comfort level:   Encourage patient to monitor pain and request assistance   Administer analgesics based on type and severity of pain and evaluate response   Consider cultural and social influences on pain and pain management   Assess pain using appropriate pain scale   Implement non-pharmacological measures as appropriate and evaluate response   Notify Licensed Independent Practitioner if interventions unsuccessful or patient reports new pain     Problem: Skin/Tissue Integrity  Goal: Absence of new skin breakdown  Description: 1.  Monitor for areas of redness and/or skin breakdown  2.  Assess vascular access sites hourly  3.  Every 4-6 hours minimum:  Change oxygen saturation probe site  4.  Every 4-6 hours:  If on nasal continuous positive airway pressure, respiratory therapy assess nares and determine need for appliance change or resting period.  7/30/2024 1953 by Ivett Samuel, RN  Outcome: Progressing     Problem: Safety - Adult  Goal: Free from fall injury  7/30/2024 1953 by Ivett Samuel, RN  Outcome: Progressing  Flowsheets (Taken 7/30/2024

## 2024-07-30 NOTE — PROGRESS NOTES
Comprehensive Nutrition Assessment    Type and Reason for Visit:  Reassess    Nutrition Recommendations/Plan:   Continue current diet.     Malnutrition Assessment:  Malnutrition Status:  At risk for malnutrition (Comment) (07/26/24 1238)    Context:  Acute Illness       Nutrition Assessment:    FU - Pt. continues on a 4 carb diet. Diet acceptance appears okay, can fall below 50% at times. Pt. is declining supplements at this time. Protein intake appears okay. Estimated needs adjusted for paraplegia, BMI classification remains class I. No need for additional food or intervention at this time. Will continue to monitor nutritional adequacy and diet acceptance while inpatient.    Nutrition Related Findings:    Cr 0.5. LBM 7/29. Wound Type: Multiple, Unstageable, Deep Tissue Injury       Current Nutrition Intake & Therapies:    Average Meal Intake: 1-25%, 26-50%, %     ADULT DIET; Regular; 4 carb choices (60 gm/meal)    Anthropometric Measures:  Height: 172.7 cm (5' 7.99\")  Ideal Body Weight (IBW): 154 lbs (70 kg)    Admission Body Weight: 98.9 kg (218 lb)  Current Body Weight: 95 kg (209 lb 7 oz), 136 % IBW. Weight Source: Bed Scale  Current BMI (kg/m2): 31.9        Weight Adjustment For: Paraplegia  Total Adjusted Percentage (Calculated): 7.5  Adjusted Ideal Body Weight (lbs) (Calculated): 142.5 lbs  Adjusted Ideal Body Weight (kg) (Calculated): 64.77 kg  Adjusted % Ideal Body Weight (Calculated): 147  Adjusted BMI (kg/m2) (Calculated): 34.3  BMI Categories: Obese Class 1 (BMI 30.0-34.9)    Estimated Daily Nutrient Needs:  Energy Requirements Based On: Kcal/kg  Weight Used for Energy Requirements: Current  Energy (kcal/day): 0867-9712 (18-20 x ABW 95 kg)  Weight Used for Protein Requirements: Ideal  Protein (g/day):  (1.2-2 x IBW 70 kg)  Method Used for Fluid Requirements: 1 ml/kcal  Fluid (ml/day): 400-1760    Nutrition Diagnosis:   Increased nutrient needs related to increase demand for energy/nutrients  as evidenced by wounds    Nutrition Interventions:   Food and/or Nutrient Delivery: Continue Current Diet, Start Oral Nutrition Supplement  Nutrition Education/Counseling: Education declined  Coordination of Nutrition Care: Continue to monitor while inpatient       Goals:  Previous Goal Met: Progressing toward Goal(s)  Goals: PO intake 50% or greater       Nutrition Monitoring and Evaluation:   Behavioral-Environmental Outcomes: None Identified  Food/Nutrient Intake Outcomes: Food and Nutrient Intake, Supplement Intake  Physical Signs/Symptoms Outcomes: Biochemical Data, GI Status, Skin    Discharge Planning:    No discharge needs at this time     Carie Monaco RD  Contact: [phone]

## 2024-07-30 NOTE — PLAN OF CARE
Problem: Discharge Planning  Goal: Discharge to home or other facility with appropriate resources  7/30/2024 0814 by Vanessa Argueta RN  Outcome: Progressing  Flowsheets (Taken 7/29/2024 2000 by Ivett Samuel, RN)  Discharge to home or other facility with appropriate resources:   Refer to discharge planning if patient needs post-hospital services based on physician order or complex needs related to functional status, cognitive ability or social support system   Identify discharge learning needs (meds, wound care, etc)   Identify barriers to discharge with patient and caregiver   Arrange for needed discharge resources and transportation as appropriate     Problem: Pain  Goal: Verbalizes/displays adequate comfort level or baseline comfort level  7/30/2024 0814 by Vanessa Argueta RN  Outcome: Progressing  Flowsheets (Taken 7/29/2024 2000 by Ivett Samuel, AIDAN)  Verbalizes/displays adequate comfort level or baseline comfort level:   Consider cultural and social influences on pain and pain management   Administer analgesics based on type and severity of pain and evaluate response   Encourage patient to monitor pain and request assistance   Assess pain using appropriate pain scale   Implement non-pharmacological measures as appropriate and evaluate response   Notify Licensed Independent Practitioner if interventions unsuccessful or patient reports new pain     Problem: Skin/Tissue Integrity  Goal: Absence of new skin breakdown  Description: 1.  Monitor for areas of redness and/or skin breakdown  2.  Assess vascular access sites hourly  3.  Every 4-6 hours minimum:  Change oxygen saturation probe site  4.  Every 4-6 hours:  If on nasal continuous positive airway pressure, respiratory therapy assess nares and determine need for appliance change or resting period.  7/30/2024 0814 by Vanessa Argueta RN  Outcome: Progressing     Problem: Safety - Adult  Goal: Free from fall injury  7/30/2024 0814 by Vanessa Argueta  RN  Outcome: Progressing  Flowsheets (Taken 7/29/2024 2001 by Ivett Samuel RN)  Free From Fall Injury: Instruct family/caregiver on patient safety     Problem: Chronic Conditions and Co-morbidities  Goal: Patient's chronic conditions and co-morbidity symptoms are monitored and maintained or improved  7/30/2024 0814 by Vanessa Argueta RN  Outcome: Progressing  Flowsheets (Taken 7/29/2024 2000 by Ivett Samuel, RN)  Care Plan - Patient's Chronic Conditions and Co-Morbidity Symptoms are Monitored and Maintained or Improved:   Update acute care plan with appropriate goals if chronic or comorbid symptoms are exacerbated and prevent overall improvement and discharge   Monitor and assess patient's chronic conditions and comorbid symptoms for stability, deterioration, or improvement   Collaborate with multidisciplinary team to address chronic and comorbid conditions and prevent exacerbation or deterioration     Problem: Nutrition Deficit:  Goal: Optimize nutritional status  7/30/2024 0814 by Vanessa Argueta RN  Outcome: Progressing  Flowsheets (Taken 7/28/2024 2300 by Shy Pope, RN)  Nutrient intake appropriate for improving, restoring, or maintaining nutritional needs:   Assess nutritional status and recommend course of action   Monitor oral intake, labs, and treatment plans     Problem: ABCDS Injury Assessment  Goal: Absence of physical injury  7/30/2024 0814 by Vanessa Argueta RN  Outcome: Progressing  Flowsheets (Taken 7/29/2024 2001 by Ivett Samuel RN)  Absence of Physical Injury: Implement safety measures based on patient assessment

## 2024-07-30 NOTE — PROGRESS NOTES
Patient off unit to echo with transport via stretcher.   Electronically signed by Vanessa Argueta RN on 7/30/2024 at 2:38 PM

## 2024-07-30 NOTE — PROGRESS NOTES
0734  Last data filed at 7/30/2024 0636  Gross per 24 hour   Intake 1553.86 ml   Output 3150 ml   Net -1596.14 ml      Vitals:   Vitals:    07/30/24 0021 07/30/24 0425 07/30/24 0517 07/30/24 0653   BP: 113/68 109/68  (!) 166/73   Pulse: 63 59  91   Resp: 17 18     Temp: 97.6 °F (36.4 °C) 98 °F (36.7 °C)  98.2 °F (36.8 °C)   TempSrc: Oral   Oral   SpO2: 96% 94%  93%   Weight:   95 kg (209 lb 7 oz)    Height:             Physical Exam:      Physical Exam Performed:    BP (!) 166/73   Pulse 91   Temp 98.2 °F (36.8 °C) (Oral)   Resp 18   Ht 1.727 m (5' 8\")   Wt 95 kg (209 lb 7 oz)   SpO2 93%   BMI 31.84 kg/m²     General appearance: No apparent distress  Respiratory:  Normal respiratory effort. Clear to auscultation, bilaterally without Rales/Wheezes/Rhonchi.  Cardiovascular: Regular rate and rhythm with normal S1/S2 without murmurs, rubs or gallops.  Abdomen: Soft, non-tender  Musculoskeletal: No clubbing.  Bilateral lower extremity edema  Skin: Buttocks ulcers stage III along with sacral ulcers  Neurologic: Paraplegia.  Psychiatric: Alert and oriented, thought content appropriate, normal insight  Capillary Refill: Brisk, 3 seconds, normal   Peripheral Pulses: +2 palpable, equal bilaterally       Medications:   Medications:    insulin lispro  15 Units SubCUTAneous TID WC    ammonium lactate   Topical BID    vancomycin  1,250 mg IntraVENous Q12H    sodium chloride flush  5-40 mL IntraVENous 2 times per day    insulin lispro  0-4 Units SubCUTAneous TID WC    insulin lispro  0-4 Units SubCUTAneous Nightly    enoxaparin  40 mg SubCUTAneous Daily    cefepime  2,000 mg IntraVENous Q12H    aspirin  81 mg Oral Daily    atenolol  25 mg Oral Daily    atorvastatin  80 mg Oral Nightly    baclofen  10 mg Oral BID    cetirizine  10 mg Oral Daily    ezetimibe  10 mg Oral Daily    famotidine  20 mg Oral Nightly    furosemide  30 mg Oral BID    gabapentin  800 mg Oral TID    insulin glargine  45 Units SubCUTAneous Nightly     findings suggestive of cystitis.  Correlate for recent instrumentation.       CBC:   Recent Labs     07/28/24  0408 07/29/24  0405 07/30/24  0545   WBC 8.8 11.5* 9.3   HGB 11.6* 12.1* 11.7*    435 448     BMP:    Recent Labs     07/28/24  0408 07/29/24  0405 07/30/24  0545    137 137   K 4.0 3.7 3.6   CL 98* 101 101   CO2 29 26 25   BUN 10 9 9   CREATININE 0.7* <0.5* 0.5*   GLUCOSE 208* 126* 130*     Hepatic:   Recent Labs     07/30/24  0545   AST 16   ALT 14   BILITOT 0.3   ALKPHOS 135*     Lipids:   Lab Results   Component Value Date/Time    CHOL 165 12/28/2018 05:17 AM    HDL 34 12/28/2018 05:17 AM    TRIG 171 12/28/2018 05:17 AM     Hemoglobin A1C:   Lab Results   Component Value Date/Time    LABA1C 8.6 07/25/2024 03:16 PM     TSH:   Lab Results   Component Value Date/Time    TSH 2.91 04/29/2011 01:10 PM     Troponin: No results found for: \"TROPONINT\"  Lactic Acid: No results for input(s): \"LACTA\" in the last 72 hours.  BNP: No results for input(s): \"PROBNP\" in the last 72 hours.  UA:  Lab Results   Component Value Date/Time    NITRU Negative 03/26/2020 08:38 PM    COLORU DK YELLOW 03/26/2020 08:38 PM    PHUR 5.5 03/26/2020 08:38 PM    WBCUA 36 03/26/2020 08:38 PM    RBCUA 1 03/26/2020 08:38 PM    MUCUS 1+ 03/26/2020 08:38 PM    CLARITYU Clear 03/26/2020 08:38 PM    LEUKOCYTESUR SMALL 03/26/2020 08:38 PM    UROBILINOGEN 1.0 03/26/2020 08:38 PM    BILIRUBINUR SMALL 03/26/2020 08:38 PM    BLOODU Negative 03/26/2020 08:38 PM    GLUCOSEU 250 03/26/2020 08:38 PM    KETUA Negative 03/26/2020 08:38 PM     Urine Cultures:   Lab Results   Component Value Date/Time    LABURIN  03/26/2020 08:38 PM     <10,000 CFU/ml mixed skin/urogenital luis miguel. No further workup     Blood Cultures:   Lab Results   Component Value Date/Time    BC No Growth after 4 days of incubation. 07/25/2024 10:44 PM     Lab Results   Component Value Date/Time    BLOODCULT2 No Growth after 4 days of incubation. 07/25/2024 10:38 PM
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
## 2024-07-30 NOTE — PROGRESS NOTES
Patient is alert & oriented x4, resting in bed, on room air. Gets up with maximove. IV abx infusing in R hand IV. 2/4 bed rails up, bed in lowest position, fall precautions in place, bed alarm on, call light within reach. Morning medications given as ordered, tolerated well. Patient c/o pain in back and legs, scheduled pain medications administered. Patient denies further needs at this time. Will cont to monitor and reassess.  Electronically signed by Vanessa Argueta RN on 7/30/2024 at 10:53 AM

## 2024-07-30 NOTE — PLAN OF CARE
Problem: Discharge Planning  Goal: Discharge to home or other facility with appropriate resources  7/29/2024 2000 by Ivett Samuel, RN  Outcome: Progressing  Flowsheets (Taken 7/29/2024 2000)  Discharge to home or other facility with appropriate resources:   Refer to discharge planning if patient needs post-hospital services based on physician order or complex needs related to functional status, cognitive ability or social support system   Identify discharge learning needs (meds, wound care, etc)   Identify barriers to discharge with patient and caregiver   Arrange for needed discharge resources and transportation as appropriate     Problem: Pain  Goal: Verbalizes/displays adequate comfort level or baseline comfort level  7/29/2024 2000 by Ivett Samuel, RN  Outcome: Progressing  Flowsheets (Taken 7/29/2024 2000)  Verbalizes/displays adequate comfort level or baseline comfort level:   Consider cultural and social influences on pain and pain management   Administer analgesics based on type and severity of pain and evaluate response   Encourage patient to monitor pain and request assistance   Assess pain using appropriate pain scale   Implement non-pharmacological measures as appropriate and evaluate response   Notify Licensed Independent Practitioner if interventions unsuccessful or patient reports new pain     Problem: Skin/Tissue Integrity  Goal: Absence of new skin breakdown  Description: 1.  Monitor for areas of redness and/or skin breakdown  2.  Assess vascular access sites hourly  3.  Every 4-6 hours minimum:  Change oxygen saturation probe site  4.  Every 4-6 hours:  If on nasal continuous positive airway pressure, respiratory therapy assess nares and determine need for appliance change or resting period.  7/29/2024 2000 by Ivett Samuel, RN  Outcome: Progressing     Problem: Safety - Adult  Goal: Free from fall injury  7/29/2024 2000 by Ivett Samuel, RN  Outcome: Progressing  Flowsheets (Taken 7/29/2024

## 2024-07-31 LAB
ALBUMIN SERPL-MCNC: 3 G/DL (ref 3.4–5)
ALBUMIN/GLOB SERPL: 0.8 {RATIO} (ref 1.1–2.2)
ALP SERPL-CCNC: 124 U/L (ref 40–129)
ALT SERPL-CCNC: 15 U/L (ref 10–40)
ANION GAP SERPL CALCULATED.3IONS-SCNC: 11 MMOL/L (ref 3–16)
AST SERPL-CCNC: 16 U/L (ref 15–37)
BILIRUB SERPL-MCNC: 0.3 MG/DL (ref 0–1)
BUN SERPL-MCNC: 9 MG/DL (ref 7–20)
CALCIUM SERPL-MCNC: 8.3 MG/DL (ref 8.3–10.6)
CHLORIDE SERPL-SCNC: 101 MMOL/L (ref 99–110)
CO2 SERPL-SCNC: 26 MMOL/L (ref 21–32)
CREAT SERPL-MCNC: <0.5 MG/DL (ref 0.9–1.3)
GFR SERPLBLD CREATININE-BSD FMLA CKD-EPI: >90 ML/MIN/{1.73_M2}
GLUCOSE BLD-MCNC: 114 MG/DL (ref 70–99)
GLUCOSE BLD-MCNC: 145 MG/DL (ref 70–99)
GLUCOSE BLD-MCNC: 173 MG/DL (ref 70–99)
GLUCOSE BLD-MCNC: 83 MG/DL (ref 70–99)
GLUCOSE SERPL-MCNC: 112 MG/DL (ref 70–99)
PERFORMED ON: ABNORMAL
PERFORMED ON: NORMAL
POTASSIUM SERPL-SCNC: 3.4 MMOL/L (ref 3.5–5.1)
PROT SERPL-MCNC: 7 G/DL (ref 6.4–8.2)
SODIUM SERPL-SCNC: 138 MMOL/L (ref 136–145)

## 2024-07-31 PROCEDURE — 1200000000 HC SEMI PRIVATE

## 2024-07-31 PROCEDURE — 99232 SBSQ HOSP IP/OBS MODERATE 35: CPT | Performed by: INTERNAL MEDICINE

## 2024-07-31 PROCEDURE — 6360000002 HC RX W HCPCS: Performed by: INTERNAL MEDICINE

## 2024-07-31 PROCEDURE — 6370000000 HC RX 637 (ALT 250 FOR IP): Performed by: INTERNAL MEDICINE

## 2024-07-31 PROCEDURE — 2580000003 HC RX 258: Performed by: INTERNAL MEDICINE

## 2024-07-31 PROCEDURE — 80053 COMPREHEN METABOLIC PANEL: CPT

## 2024-07-31 PROCEDURE — 36415 COLL VENOUS BLD VENIPUNCTURE: CPT

## 2024-07-31 RX ORDER — SULFAMETHOXAZOLE AND TRIMETHOPRIM 800; 160 MG/1; MG/1
1 TABLET ORAL 2 TIMES DAILY
Qty: 28 TABLET | Refills: 0
Start: 2024-07-31 | End: 2024-07-31

## 2024-07-31 RX ORDER — METRONIDAZOLE 500 MG/1
500 TABLET ORAL EVERY 8 HOURS SCHEDULED
Qty: 42 TABLET | Refills: 0 | Status: SHIPPED | OUTPATIENT
Start: 2024-07-31 | End: 2024-08-14

## 2024-07-31 RX ORDER — METRONIDAZOLE 500 MG/1
500 TABLET ORAL EVERY 8 HOURS SCHEDULED
Qty: 42 TABLET | Refills: 0
Start: 2024-07-31 | End: 2024-07-31

## 2024-07-31 RX ORDER — SULFAMETHOXAZOLE AND TRIMETHOPRIM 800; 160 MG/1; MG/1
1 TABLET ORAL 2 TIMES DAILY
Qty: 28 TABLET | Refills: 0 | Status: SHIPPED | OUTPATIENT
Start: 2024-07-31 | End: 2024-08-14

## 2024-07-31 RX ADMIN — GABAPENTIN 800 MG: 400 CAPSULE ORAL at 15:18

## 2024-07-31 RX ADMIN — ATENOLOL 25 MG: 25 TABLET ORAL at 09:36

## 2024-07-31 RX ADMIN — BACLOFEN 10 MG: 10 TABLET ORAL at 20:24

## 2024-07-31 RX ADMIN — SPIRONOLACTONE 25 MG: 25 TABLET ORAL at 09:37

## 2024-07-31 RX ADMIN — OXYBUTYNIN CHLORIDE 10 MG: 10 TABLET, EXTENDED RELEASE ORAL at 09:36

## 2024-07-31 RX ADMIN — SODIUM CHLORIDE, PRESERVATIVE FREE 10 ML: 5 INJECTION INTRAVENOUS at 09:38

## 2024-07-31 RX ADMIN — Medication: at 20:24

## 2024-07-31 RX ADMIN — CETIRIZINE HYDROCHLORIDE 10 MG: 10 TABLET, FILM COATED ORAL at 09:37

## 2024-07-31 RX ADMIN — VANCOMYCIN 1250 MG: 1.75 INJECTION, SOLUTION INTRAVENOUS at 12:02

## 2024-07-31 RX ADMIN — POTASSIUM CHLORIDE 40 MEQ: 1500 TABLET, EXTENDED RELEASE ORAL at 09:37

## 2024-07-31 RX ADMIN — TRAZODONE HYDROCHLORIDE 50 MG: 50 TABLET ORAL at 20:24

## 2024-07-31 RX ADMIN — FUROSEMIDE 30 MG: 20 TABLET ORAL at 09:37

## 2024-07-31 RX ADMIN — ATORVASTATIN CALCIUM 80 MG: 80 TABLET, FILM COATED ORAL at 20:25

## 2024-07-31 RX ADMIN — INSULIN LISPRO 15 UNITS: 100 INJECTION, SOLUTION INTRAVENOUS; SUBCUTANEOUS at 11:58

## 2024-07-31 RX ADMIN — OXYCODONE HYDROCHLORIDE AND ACETAMINOPHEN 1 TABLET: 5; 325 TABLET ORAL at 20:24

## 2024-07-31 RX ADMIN — VANCOMYCIN 1250 MG: 1.75 INJECTION, SOLUTION INTRAVENOUS at 23:49

## 2024-07-31 RX ADMIN — OXYCODONE HYDROCHLORIDE AND ACETAMINOPHEN 1 TABLET: 5; 325 TABLET ORAL at 15:19

## 2024-07-31 RX ADMIN — Medication: at 16:58

## 2024-07-31 RX ADMIN — METRONIDAZOLE 500 MG: 500 TABLET ORAL at 15:18

## 2024-07-31 RX ADMIN — METRONIDAZOLE 500 MG: 500 TABLET ORAL at 05:11

## 2024-07-31 RX ADMIN — ASPIRIN 81 MG: 81 TABLET, CHEWABLE ORAL at 09:37

## 2024-07-31 RX ADMIN — INSULIN GLARGINE 45 UNITS: 100 INJECTION, SOLUTION SUBCUTANEOUS at 20:25

## 2024-07-31 RX ADMIN — CEFEPIME 2000 MG: 2 INJECTION, POWDER, FOR SOLUTION INTRAVENOUS at 18:42

## 2024-07-31 RX ADMIN — LOSARTAN POTASSIUM 25 MG: 25 TABLET, FILM COATED ORAL at 09:37

## 2024-07-31 RX ADMIN — METRONIDAZOLE 500 MG: 500 TABLET ORAL at 20:25

## 2024-07-31 RX ADMIN — OXYCODONE HYDROCHLORIDE AND ACETAMINOPHEN 1 TABLET: 5; 325 TABLET ORAL at 09:36

## 2024-07-31 RX ADMIN — FAMOTIDINE 20 MG: 20 TABLET, FILM COATED ORAL at 20:24

## 2024-07-31 RX ADMIN — GABAPENTIN 800 MG: 400 CAPSULE ORAL at 20:24

## 2024-07-31 RX ADMIN — EZETIMIBE 10 MG: 10 TABLET ORAL at 09:36

## 2024-07-31 RX ADMIN — BACLOFEN 10 MG: 10 TABLET ORAL at 09:37

## 2024-07-31 RX ADMIN — INSULIN LISPRO 15 UNITS: 100 INJECTION, SOLUTION INTRAVENOUS; SUBCUTANEOUS at 09:38

## 2024-07-31 RX ADMIN — GABAPENTIN 800 MG: 400 CAPSULE ORAL at 09:37

## 2024-07-31 RX ADMIN — FUROSEMIDE 30 MG: 20 TABLET ORAL at 20:25

## 2024-07-31 RX ADMIN — OXYCODONE HYDROCHLORIDE AND ACETAMINOPHEN 500 MG: 500 TABLET ORAL at 09:37

## 2024-07-31 RX ADMIN — CEFEPIME 2000 MG: 2 INJECTION, POWDER, FOR SOLUTION INTRAVENOUS at 05:12

## 2024-07-31 ASSESSMENT — PAIN SCALES - GENERAL
PAINLEVEL_OUTOF10: 10
PAINLEVEL_OUTOF10: 6
PAINLEVEL_OUTOF10: 5
PAINLEVEL_OUTOF10: 10
PAINLEVEL_OUTOF10: 10

## 2024-07-31 ASSESSMENT — PAIN - FUNCTIONAL ASSESSMENT
PAIN_FUNCTIONAL_ASSESSMENT: PREVENTS OR INTERFERES SOME ACTIVE ACTIVITIES AND ADLS
PAIN_FUNCTIONAL_ASSESSMENT: PREVENTS OR INTERFERES SOME ACTIVE ACTIVITIES AND ADLS
PAIN_FUNCTIONAL_ASSESSMENT: ACTIVITIES ARE NOT PREVENTED
PAIN_FUNCTIONAL_ASSESSMENT: PREVENTS OR INTERFERES SOME ACTIVE ACTIVITIES AND ADLS

## 2024-07-31 ASSESSMENT — PAIN DESCRIPTION - FREQUENCY
FREQUENCY: CONTINUOUS
FREQUENCY: CONTINUOUS

## 2024-07-31 ASSESSMENT — PAIN DESCRIPTION - DESCRIPTORS
DESCRIPTORS: ACHING

## 2024-07-31 ASSESSMENT — PAIN DESCRIPTION - LOCATION
LOCATION: LEG;BACK
LOCATION: BACK;LEG
LOCATION: BUTTOCKS;LEG
LOCATION: BACK;LEG

## 2024-07-31 ASSESSMENT — PAIN DESCRIPTION - ONSET
ONSET: ON-GOING
ONSET: ON-GOING

## 2024-07-31 ASSESSMENT — PAIN DESCRIPTION - ORIENTATION
ORIENTATION: RIGHT;LEFT

## 2024-07-31 ASSESSMENT — PAIN DESCRIPTION - PAIN TYPE
TYPE: CHRONIC PAIN
TYPE: CHRONIC PAIN

## 2024-07-31 NOTE — PROGRESS NOTES
V2.0    [facility] [place] Progress Note      Name:  César Saunders /Age/Sex: 1965  (59 y.o. male)   MRN & CSN:  [id number] & [id number] Encounter Date/Time: 2024 8:52 AM EDT   Location:  [id number] PCP: Ulises Hager MD     Attending:Ras Dhaliwal MD       Hospital Day: 7    Assessment and Recommendations   César Saunders is a 59 y.o. male who presents with Cellulitis      Plan:   Cellulitis with bilateral buttocks ulcers and wounds, vancomycin, Flagyl and cefepime General surgery consulted no plan for surgical intervention.  White count improved, ID following, keep on IV antibiotics likely discharge in the next 24 hours on Bactrim and Flagyl for total of 14 days per ID.  Diabetes mellitus sliding scale, preprandial and Lantus, diabetic diet, blood sugar relatively stable  Paraplegia PT OT  Essential hypertension p.o. medication  History of VTE not on anticoagulation at this time  Bigeminy on telemetry strips completely asymptomatic.  EP consulted, recommended Holter monitor on discharge and follow-up with cardiology as outpatient echo completed yesterday with estimated EF of 55 to 60%, LV size normal    Diet ADULT DIET; Regular; 4 carb choices (60 gm/meal)   DVT Prophylaxis [] Lovenox, []  Heparin, [] SCDs, [] Ambulation,  [] Eliquis, [] Xarelto  [] Coumadin   Code Status Full Code             Personally reviewed Lab Studies and Imaging     Discussed management of the case with infectious disease        Medical Decision Making:  The following items were considered in medical decision making:  Discussion of patient care with other providers  Reviewed clinical lab tests  Reviewed radiology tests  Reviewed other diagnostic tests/interventions  Independent review of radiologic images  Microbiology cultures and other micro tests reviewed      Subjective:     Chief Complaint: Buttocks ulcers    César Saunders is a 59 y.o. male who presents with buttocks ulcer being on IV antibiotics no fever

## 2024-07-31 NOTE — PROGRESS NOTES
I have recommended that this patient be tuned  q2 but he declines at this time. I have discussed the risks associated with not being turned . The patient verbalizes understanding.  notified.  Electronically signed by VIVIANA WHITE RN on 7/31/2024 at 10:55 AM

## 2024-07-31 NOTE — PLAN OF CARE
Problem: Discharge Planning  Goal: Discharge to home or other facility with appropriate resources  Outcome: Progressing     Problem: Pain  Goal: Verbalizes/displays adequate comfort level or baseline comfort level  Outcome: Progressing     Problem: Skin/Tissue Integrity  Goal: Absence of new skin breakdown  Description: 1.  Monitor for areas of redness and/or skin breakdown  2.  Assess vascular access sites hourly  3.  Every 4-6 hours minimum:  Change oxygen saturation probe site  4.  Every 4-6 hours:  If on nasal continuous positive airway pressure, respiratory therapy assess nares and determine need for appliance change or resting period.  Outcome: Progressing     Problem: Safety - Adult  Goal: Free from fall injury  Outcome: Progressing  Flowsheets (Taken 7/31/2024 1046)  Free From Fall Injury: Instruct family/caregiver on patient safety     Problem: Chronic Conditions and Co-morbidities  Goal: Patient's chronic conditions and co-morbidity symptoms are monitored and maintained or improved  Outcome: Progressing     Problem: Nutrition Deficit:  Goal: Optimize nutritional status  Outcome: Progressing     Problem: ABCDS Injury Assessment  Goal: Absence of physical injury  Outcome: Progressing   Electronically signed by VIVIANA WHITE RN on 7/31/2024 at 10:50 AM

## 2024-07-31 NOTE — PROGRESS NOTES
Patient up in bed and aaox4. Patient reporting 1010 pain back pain. Pain being managed with percocet. Head to toe assessment completed with changes documented. Patient wanted this RN to take Ace wrap off because they were hurting his legs. Patient educated on the importance of keeping the ace wraps on. Patient still wanted wraps off his legs. Fall precautions in place.   Electronically signed by VIVIANA WHITE RN on 7/31/2024 at 11:13 AM'

## 2024-07-31 NOTE — PROGRESS NOTES
Patient is alert and oriented x 4 in bed. Vital, assessment and daily care given. Education and care plan updated. Medication given as per order. Fall precaution initiated, call light within reach, bed in low position and wheels locked. Addressed current patient's need. Continue to monitor.    Electronically signed by Ivett Samuel RN on 7/31/2024 at 12:51 AM
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
## 2024-07-31 NOTE — DISCHARGE SUMMARY
Hospital Medicine Discharge Summary    Patient ID: César Saunders      Patient's PCP: Ulises Hager MD    Admit Date: 7/25/2024     Discharge Date:   07/31/2024    Admitting Provider: Ras Dhaliwal MD     Discharge Provider: Ras Dhaliwal MD     Discharge Diagnoses:       Active Hospital Problems    Diagnosis     Wound of left buttock [S31.829A]     Buttock wound, right, initial encounter [S31.819A]     Elevated C-reactive protein (CRP) [R79.82]     Elevated erythrocyte sedimentation rate [R70.0]     Sacral decubitus ulcer, stage III (HCC) [L89.153]     MRSA colonization [Z22.322]     Cellulitis [L03.90]        The patient was seen and examined on day of discharge and this discharge summary is in conjunction with any daily progress note from day of discharge.    Hospital Course:     From HPI:\"59 y.o. male who presented to [facility] with buttock cellulitis and wounds patient stated he is having wounds for more than 2 weeks, patient from nursing home bilateral paralysis on his lower extremity, denies fever chills nausea vomiting no chest pain shortness of breath abdominal pain does not smoke does not drink alcohol, in ED found to have erythema surrounding deep ulcers and wounds on both buttocks, being admitted for further management and treatment. \"      Cellulitis with bilateral buttocks ulcers and wounds, vancomycin, Flagyl and cefepime General surgery consulted no plan for surgical intervention.  White count improved, ID following, discussed patient with ID Dr. Enrique and okay to discharge on 14 days of Flagyl and 14 days of Bactrim today follow-up with wound care  Paraplegia PT OT  Essential hypertension p.o. medication  History of VTE not on anticoagulation at this time  Bigeminy on telemetry strips completely asymptomatic.  EP consulted, recommended Holter monitor on discharge and follow-up with cardiology as outpatient echo completed yesterday with estimated EF of 55 to 60%, LV size

## 2024-07-31 NOTE — CARE COORDINATION
Awaiting return call from [facility] to discuss the fact that patient states he is returning to his AL apt and also needs then to pick him up in their van.   Noted d/c order just put in at 4:50pm.      Electronically signed by ISABELL Mccullough on 7/31/2024 at 4:53 PM

## 2024-08-01 VITALS
HEIGHT: 68 IN | HEART RATE: 66 BPM | TEMPERATURE: 97.6 F | OXYGEN SATURATION: 95 % | SYSTOLIC BLOOD PRESSURE: 127 MMHG | DIASTOLIC BLOOD PRESSURE: 73 MMHG | WEIGHT: 228.62 LBS | RESPIRATION RATE: 18 BRPM | BODY MASS INDEX: 34.65 KG/M2

## 2024-08-01 LAB
ALBUMIN SERPL-MCNC: 3.4 G/DL (ref 3.4–5)
ALBUMIN/GLOB SERPL: 0.9 {RATIO} (ref 1.1–2.2)
ALP SERPL-CCNC: 124 U/L (ref 40–129)
ALT SERPL-CCNC: 15 U/L (ref 10–40)
ANION GAP SERPL CALCULATED.3IONS-SCNC: 9 MMOL/L (ref 3–16)
AST SERPL-CCNC: 15 U/L (ref 15–37)
BILIRUB SERPL-MCNC: <0.2 MG/DL (ref 0–1)
BUN SERPL-MCNC: 12 MG/DL (ref 7–20)
CALCIUM SERPL-MCNC: 8.4 MG/DL (ref 8.3–10.6)
CHLORIDE SERPL-SCNC: 102 MMOL/L (ref 99–110)
CO2 SERPL-SCNC: 27 MMOL/L (ref 21–32)
CREAT SERPL-MCNC: <0.5 MG/DL (ref 0.9–1.3)
ECHO BSA: 2.13 M2
EKG ATRIAL RATE: 75 BPM
EKG DIAGNOSIS: NORMAL
EKG P AXIS: 30 DEGREES
EKG P-R INTERVAL: 152 MS
EKG Q-T INTERVAL: 416 MS
EKG QRS DURATION: 96 MS
EKG QTC CALCULATION (BAZETT): 464 MS
EKG R AXIS: 50 DEGREES
EKG T AXIS: 54 DEGREES
EKG VENTRICULAR RATE: 75 BPM
GFR SERPLBLD CREATININE-BSD FMLA CKD-EPI: >90 ML/MIN/{1.73_M2}
GLUCOSE BLD-MCNC: 165 MG/DL (ref 70–99)
GLUCOSE BLD-MCNC: 180 MG/DL (ref 70–99)
GLUCOSE SERPL-MCNC: 145 MG/DL (ref 70–99)
PERFORMED ON: ABNORMAL
PERFORMED ON: ABNORMAL
POTASSIUM SERPL-SCNC: 3.6 MMOL/L (ref 3.5–5.1)
PROT SERPL-MCNC: 7.1 G/DL (ref 6.4–8.2)
SODIUM SERPL-SCNC: 138 MMOL/L (ref 136–145)

## 2024-08-01 PROCEDURE — 6370000000 HC RX 637 (ALT 250 FOR IP): Performed by: INTERNAL MEDICINE

## 2024-08-01 PROCEDURE — 93005 ELECTROCARDIOGRAM TRACING: CPT | Performed by: INTERNAL MEDICINE

## 2024-08-01 PROCEDURE — 93010 ELECTROCARDIOGRAM REPORT: CPT | Performed by: INTERNAL MEDICINE

## 2024-08-01 PROCEDURE — 80053 COMPREHEN METABOLIC PANEL: CPT

## 2024-08-01 PROCEDURE — 36415 COLL VENOUS BLD VENIPUNCTURE: CPT

## 2024-08-01 PROCEDURE — 94760 N-INVAS EAR/PLS OXIMETRY 1: CPT

## 2024-08-01 PROCEDURE — 6360000002 HC RX W HCPCS: Performed by: INTERNAL MEDICINE

## 2024-08-01 PROCEDURE — 2580000003 HC RX 258: Performed by: INTERNAL MEDICINE

## 2024-08-01 RX ORDER — SULFAMETHOXAZOLE AND TRIMETHOPRIM 800; 160 MG/1; MG/1
1 TABLET ORAL EVERY 12 HOURS SCHEDULED
Status: DISCONTINUED | OUTPATIENT
Start: 2024-08-01 | End: 2024-08-01 | Stop reason: HOSPADM

## 2024-08-01 RX ADMIN — INSULIN LISPRO 15 UNITS: 100 INJECTION, SOLUTION INTRAVENOUS; SUBCUTANEOUS at 09:26

## 2024-08-01 RX ADMIN — ATENOLOL 25 MG: 25 TABLET ORAL at 09:25

## 2024-08-01 RX ADMIN — Medication: at 09:25

## 2024-08-01 RX ADMIN — OXYCODONE HYDROCHLORIDE AND ACETAMINOPHEN 1 TABLET: 5; 325 TABLET ORAL at 09:23

## 2024-08-01 RX ADMIN — LOSARTAN POTASSIUM 25 MG: 25 TABLET, FILM COATED ORAL at 09:25

## 2024-08-01 RX ADMIN — METRONIDAZOLE 500 MG: 500 TABLET ORAL at 13:35

## 2024-08-01 RX ADMIN — SODIUM CHLORIDE, PRESERVATIVE FREE 10 ML: 5 INJECTION INTRAVENOUS at 11:32

## 2024-08-01 RX ADMIN — CETIRIZINE HYDROCHLORIDE 10 MG: 10 TABLET, FILM COATED ORAL at 09:24

## 2024-08-01 RX ADMIN — METRONIDAZOLE 500 MG: 500 TABLET ORAL at 05:48

## 2024-08-01 RX ADMIN — EZETIMIBE 10 MG: 10 TABLET ORAL at 09:24

## 2024-08-01 RX ADMIN — GABAPENTIN 800 MG: 400 CAPSULE ORAL at 09:24

## 2024-08-01 RX ADMIN — OXYCODONE HYDROCHLORIDE AND ACETAMINOPHEN 500 MG: 500 TABLET ORAL at 09:24

## 2024-08-01 RX ADMIN — CEFEPIME 2000 MG: 2 INJECTION, POWDER, FOR SOLUTION INTRAVENOUS at 05:48

## 2024-08-01 RX ADMIN — VANCOMYCIN 1250 MG: 1.75 INJECTION, SOLUTION INTRAVENOUS at 11:29

## 2024-08-01 RX ADMIN — FUROSEMIDE 30 MG: 20 TABLET ORAL at 09:24

## 2024-08-01 RX ADMIN — SPIRONOLACTONE 25 MG: 25 TABLET ORAL at 09:24

## 2024-08-01 RX ADMIN — OXYBUTYNIN CHLORIDE 10 MG: 10 TABLET, EXTENDED RELEASE ORAL at 09:24

## 2024-08-01 RX ADMIN — GABAPENTIN 800 MG: 400 CAPSULE ORAL at 13:35

## 2024-08-01 RX ADMIN — INSULIN LISPRO 15 UNITS: 100 INJECTION, SOLUTION INTRAVENOUS; SUBCUTANEOUS at 11:27

## 2024-08-01 RX ADMIN — OXYCODONE HYDROCHLORIDE AND ACETAMINOPHEN 1 TABLET: 5; 325 TABLET ORAL at 14:23

## 2024-08-01 RX ADMIN — ASPIRIN 81 MG: 81 TABLET, CHEWABLE ORAL at 09:24

## 2024-08-01 ASSESSMENT — PAIN DESCRIPTION - DESCRIPTORS
DESCRIPTORS: ACHING;BURNING
DESCRIPTORS: BURNING

## 2024-08-01 ASSESSMENT — PAIN DESCRIPTION - FREQUENCY: FREQUENCY: CONTINUOUS

## 2024-08-01 ASSESSMENT — PAIN DESCRIPTION - ORIENTATION
ORIENTATION: RIGHT;LEFT
ORIENTATION: RIGHT;LEFT

## 2024-08-01 ASSESSMENT — PAIN DESCRIPTION - PAIN TYPE: TYPE: CHRONIC PAIN

## 2024-08-01 ASSESSMENT — PAIN SCALES - GENERAL
PAINLEVEL_OUTOF10: 10
PAINLEVEL_OUTOF10: 10
PAINLEVEL_OUTOF10: 5

## 2024-08-01 ASSESSMENT — PAIN DESCRIPTION - LOCATION
LOCATION: LEG
LOCATION: LEG

## 2024-08-01 ASSESSMENT — PAIN - FUNCTIONAL ASSESSMENT
PAIN_FUNCTIONAL_ASSESSMENT: PREVENTS OR INTERFERES SOME ACTIVE ACTIVITIES AND ADLS
PAIN_FUNCTIONAL_ASSESSMENT: PREVENTS OR INTERFERES WITH MANY ACTIVE NOT PASSIVE ACTIVITIES

## 2024-08-01 ASSESSMENT — PAIN SCALES - WONG BAKER: WONGBAKER_NUMERICALRESPONSE: HURTS A LITTLE BIT

## 2024-08-01 ASSESSMENT — PAIN DESCRIPTION - ONSET: ONSET: ON-GOING

## 2024-08-01 NOTE — DISCHARGE INSTR - DIET

## 2024-08-01 NOTE — PLAN OF CARE
Problem: Discharge Planning  Goal: Discharge to home or other facility with appropriate resources  8/1/2024 0028 by Hollie Díaz RN  Outcome: Progressing  7/31/2024 1050 by Trent Almonte RN  Outcome: Progressing  Flowsheets (Taken 7/31/2024 1050)  Discharge to home or other facility with appropriate resources:   Identify barriers to discharge with patient and caregiver   Arrange for needed discharge resources and transportation as appropriate     Problem: Pain  Goal: Verbalizes/displays adequate comfort level or baseline comfort level  8/1/2024 0028 by Hollie Díaz RN  Outcome: Progressing  7/31/2024 1050 by Trent Almonte RN  Outcome: Progressing     Problem: Skin/Tissue Integrity  Goal: Absence of new skin breakdown  Description: 1.  Monitor for areas of redness and/or skin breakdown  2.  Assess vascular access sites hourly  3.  Every 4-6 hours minimum:  Change oxygen saturation probe site  4.  Every 4-6 hours:  If on nasal continuous positive airway pressure, respiratory therapy assess nares and determine need for appliance change or resting period.  8/1/2024 0028 by Hollie Díaz RN  Outcome: Progressing  7/31/2024 1050 by Trent Almonte RN  Outcome: Progressing     Problem: Safety - Adult  Goal: Free from fall injury  8/1/2024 0028 by Hollie Díaz RN  Outcome: Progressing  Flowsheets (Taken 8/1/2024 0027)  Free From Fall Injury: Instruct family/caregiver on patient safety  7/31/2024 1050 by Trent Almonte RN  Outcome: Progressing  Flowsheets (Taken 7/31/2024 1046)  Free From Fall Injury: Instruct family/caregiver on patient safety     Problem: Chronic Conditions and Co-morbidities  Goal: Patient's chronic conditions and co-morbidity symptoms are monitored and maintained or improved  8/1/2024 0028 by Hollie Díaz RN  Outcome: Progressing  7/31/2024 1050 by Trent Almonte RN  Outcome: Progressing  Flowsheets (Taken 7/31/2024 1050)  Care Plan - Patient's Chronic

## 2024-08-01 NOTE — PROGRESS NOTES
I have recommended that this patient have a q2 turns  but he declines at this time. I have discussed the risks associated with not turning.The patient verbalizes understanding. Lee Ann BERMUDEZ aware.  Electronically signed by VIVIANA WHITE RN on 8/1/2024 at 1:55 PM

## 2024-08-01 NOTE — PROGRESS NOTES
Data- discharge order received, pt verbalized agreement to discharge, disposition to previous residence - [facility].     Action- discharge instructions prepared and given to patient / transporter from [facility], pt verbalized understanding. Medication information packet given r/t NEW and/or CHANGED prescriptions emphasizing name/purpose/side effects, pt verbalized understanding. Discharge instruction summary: Diet- as tolerated, Activity- as tolerated, Primary Care Physician as follows: Ulises Hager -[phone] f/u appointment to be scheduled by patient - follow ups w/ cardiology & wound care also required within 1 week (contact information provided to patient to schedule appointments).     Response- Pt belongings gathered, IV removed. Disposition is home (no HHC/DME needs), transported with [facility], taken to lobby via w/c [facility] employee, no complications.

## 2024-08-01 NOTE — CARE COORDINATION
Bal Toscano is agreeable to a trial period back in his apt. Discussed what nursing staff have been doing here in the way of wound care and need for follow up with wound care clinic in one week. Return transportation scheduled with [facility] services.   Electronically signed by ISABELL Mccullough on 8/1/2024 at 3:08 PM

## 2024-08-01 NOTE — PROGRESS NOTES
V2.0    [facility] Progress Note      Name:  César Saunders /Age/Sex: 1965  (59 y.o. male)   MRN & CSN:  [id number] & [id number] Encounter Date/Time: 2024 10:59 AM EDT   Location:  [id number] PCP: Ulises Hager MD     Attending:Ras Dhaliwal MD       Hospital Day: 8    Assessment and Recommendations   César Saunders is a 59 y.o. male who presents with Cellulitis      Plan:     Cellulitis with bilateral buttocks ulcers and wounds, vancomycin, Flagyl and cefepime General surgery consulted no plan for surgical intervention.  White count improved, ID following, discussed patient with ID Dr. Enrique yesterday and okay to discharge on 14 days of Flagyl and 14 days of Bactrim today follow-up with wound care this was discussed again with patient and discussed with case management  Paraplegia PT OT  Essential hypertension p.o. medication  History of VTE not on anticoagulation at this time  Bigeminy on telemetry strips completely asymptomatic.  EP consulted, recommended Holter monitor on discharge and follow-up with cardiology as outpatient echo completed yesterday with estimated EF of 55 to 60%, LV size normal     Diet ADULT DIET; Regular; 4 carb choices (60 gm/meal)   DVT Prophylaxis [] Lovenox, []  Heparin, [] SCDs, [] Ambulation,  [] Eliquis, [] Xarelto  [] Coumadin   Code Status Full Code             Personally reviewed Lab Studies and Imaging     Discussed management of the case with case management    EKG reviewed by myself no ST elevation nonspecific ST changes    Medical Decision Making:  The following items were considered in medical decision making:  Discussion of patient care with other providers  Reviewed clinical lab tests  Reviewed radiology tests  Reviewed other diagnostic tests/interventions  Independent review of radiologic images  Microbiology cultures and other micro tests reviewed      Subjective:     Chief Complaint: Buttocks wound    César Saunders is a 59 y.o. male who

## 2024-08-01 NOTE — PLAN OF CARE
Problem: Discharge Planning  Goal: Discharge to home or other facility with appropriate resources  8/1/2024 1020 by Trent Almonte RN  Outcome: Progressing  Flowsheets (Taken 8/1/2024 1016)  Discharge to home or other facility with appropriate resources:   Identify barriers to discharge with patient and caregiver   Arrange for needed discharge resources and transportation as appropriate  8/1/2024 0028 by Hollie Díaz RN  Outcome: Progressing     Problem: Pain  Goal: Verbalizes/displays adequate comfort level or baseline comfort level  8/1/2024 1020 by Trent Almonte RN  Outcome: Progressing  8/1/2024 0028 by Hollie Díaz RN  Outcome: Progressing     Problem: Skin/Tissue Integrity  Goal: Absence of new skin breakdown  Description: 1.  Monitor for areas of redness and/or skin breakdown  2.  Assess vascular access sites hourly  3.  Every 4-6 hours minimum:  Change oxygen saturation probe site  4.  Every 4-6 hours:  If on nasal continuous positive airway pressure, respiratory therapy assess nares and determine need for appliance change or resting period.  8/1/2024 1020 by Trent Almonte RN  Outcome: Progressing  8/1/2024 0028 by Hollie Díaz RN  Outcome: Progressing     Problem: Safety - Adult  Goal: Free from fall injury  8/1/2024 1020 by Trent Almonte RN  Outcome: Progressing  Flowsheets (Taken 8/1/2024 1015)  Free From Fall Injury:   Instruct family/caregiver on patient safety   Based on caregiver fall risk screen, instruct family/caregiver to ask for assistance with transferring infant if caregiver noted to have fall risk factors  8/1/2024 0028 by Hollie Díaz RN  Outcome: Progressing  Flowsheets (Taken 8/1/2024 0027)  Free From Fall Injury: Instruct family/caregiver on patient safety     Problem: Chronic Conditions and Co-morbidities  Goal: Patient's chronic conditions and co-morbidity symptoms are monitored and maintained or improved  8/1/2024 1020 by Trent Almonte  RN  Outcome: Progressing  Flowsheets (Taken 8/1/2024 1016)  Care Plan - Patient's Chronic Conditions and Co-Morbidity Symptoms are Monitored and Maintained or Improved:   Monitor and assess patient's chronic conditions and comorbid symptoms for stability, deterioration, or improvement   Collaborate with multidisciplinary team to address chronic and comorbid conditions and prevent exacerbation or deterioration  8/1/2024 0028 by Hollie Díaz RN  Outcome: Progressing     Problem: Nutrition Deficit:  Goal: Optimize nutritional status  8/1/2024 1020 by Trent White RN  Outcome: Progressing  8/1/2024 0028 by Hollie Díaz RN  Outcome: Progressing     Problem: ABCDS Injury Assessment  Goal: Absence of physical injury  8/1/2024 1020 by Trent White RN  Outcome: Progressing  Flowsheets (Taken 8/1/2024 1015)  Absence of Physical Injury: Implement safety measures based on patient assessment  8/1/2024 0028 by Hollie Díaz RN  Outcome: Progressing  Flowsheets (Taken 8/1/2024 0027)  Absence of Physical Injury: Implement safety measures based on patient assessment   Electronically signed by TRENT WHITE RN on 8/1/2024 at 10:20 AM

## 2024-08-01 NOTE — PLAN OF CARE
Problem: Discharge Planning  Goal: Discharge to home or other facility with appropriate resources  8/1/2024 0028 by Hollie Díaz RN  Outcome: Progressing     Problem: Pain  Goal: Verbalizes/displays adequate comfort level or baseline comfort level  8/1/2024 0028 by Hollie Díaz RN  Outcome: Progressing     Problem: Skin/Tissue Integrity  Goal: Absence of new skin breakdown  Description: 1.  Monitor for areas of redness and/or skin breakdown  2.  Assess vascular access sites hourly  3.  Every 4-6 hours minimum:  Change oxygen saturation probe site  4.  Every 4-6 hours:  If on nasal continuous positive airway pressure, respiratory therapy assess nares and determine need for appliance change or resting period.  8/1/2024 0028 by Hollie Díaz RN  Outcome: Progressing     Problem: Safety - Adult  Goal: Free from fall injury  Recent Flowsheet Documentation  Taken 8/1/2024 1015 by Trent Almonte RN  Free From Fall Injury:   Instruct family/caregiver on patient safety   Based on caregiver fall risk screen, instruct family/caregiver to ask for assistance with transferring infant if caregiver noted to have fall risk factors  8/1/2024 0028 by Hollie Díaz RN  Outcome: Progressing  Flowsheets (Taken 8/1/2024 0027)  Free From Fall Injury: Instruct family/caregiver on patient safety     Problem: Chronic Conditions and Co-morbidities  Goal: Patient's chronic conditions and co-morbidity symptoms are monitored and maintained or improved  8/1/2024 0028 by Hollie Díaz RN  Outcome: Progressing     Problem: Nutrition Deficit:  Goal: Optimize nutritional status  8/1/2024 0028 by Hollie Díaz RN  Outcome: Progressing     Problem: ABCDS Injury Assessment  Goal: Absence of physical injury  Recent Flowsheet Documentation  Taken 8/1/2024 1015 by Trent Almonte RN  Absence of Physical Injury: Implement safety measures based on patient assessment  8/1/2024 0028 by Hollie Díaz

## 2024-08-05 ENCOUNTER — TELEPHONE (OUTPATIENT)
Dept: CARDIOLOGY CLINIC | Age: 59
End: 2024-08-05

## 2024-08-05 NOTE — TELEPHONE ENCOUNTER
Patient had monitor placed at discharge from hospital. He is scheduled to follow up with TRAVIS Juarez NP on 09/06/2024 this is appropriate follow up.    Please notify, [facility] [phone]

## 2024-08-05 NOTE — TELEPHONE ENCOUNTER
[facility] called to schedule patients hospital follow up.     Please assist with date and time.       Callback: [phone]   Patient received from Mariola LIND lying in bed waiting for bed assignment. Patient received from day shift, AOx3, in no acute distress at this time, speaking appropriate, ambulatory with steady gait. Patient safety precautions in place, awaiting bed assignment. pt aox4, no sob, no n/v, voiding, tolerating diet well

## 2024-08-09 ENCOUNTER — HOSPITAL ENCOUNTER (OUTPATIENT)
Dept: WOUND CARE | Age: 59
Discharge: HOME | End: 2024-08-09

## 2024-08-19 LAB — ECHO BSA: 2.13 M2

## 2024-09-20 ENCOUNTER — OFFICE VISIT (OUTPATIENT)
Dept: CARDIOLOGY CLINIC | Age: 59
End: 2024-09-20

## 2024-09-20 VITALS
DIASTOLIC BLOOD PRESSURE: 60 MMHG | SYSTOLIC BLOOD PRESSURE: 100 MMHG | BODY MASS INDEX: 34.77 KG/M2 | HEART RATE: 38 BPM | HEIGHT: 68 IN | OXYGEN SATURATION: 95 %

## 2024-09-20 DIAGNOSIS — I49.8 VENTRICULAR BIGEMINY: ICD-10-CM

## 2024-09-20 DIAGNOSIS — I49.8 VENTRICULAR BIGEMINY: Primary | ICD-10-CM

## 2024-09-20 DIAGNOSIS — I20.9 ANGINA PECTORIS (HCC): ICD-10-CM

## 2024-09-20 LAB
ALBUMIN SERPL-MCNC: 3.7 G/DL (ref 3.4–5)
ALBUMIN/GLOB SERPL: 1.2 {RATIO} (ref 1.1–2.2)
ALP SERPL-CCNC: 176 U/L (ref 40–129)
ALT SERPL-CCNC: 17 U/L (ref 10–40)
ANION GAP SERPL CALCULATED.3IONS-SCNC: 12 MMOL/L (ref 3–16)
AST SERPL-CCNC: 14 U/L (ref 15–37)
BILIRUB SERPL-MCNC: 0.3 MG/DL (ref 0–1)
BUN SERPL-MCNC: 11 MG/DL (ref 7–20)
CALCIUM SERPL-MCNC: 9.3 MG/DL (ref 8.3–10.6)
CHLORIDE SERPL-SCNC: 98 MMOL/L (ref 99–110)
CHOLEST SERPL-MCNC: 125 MG/DL (ref 0–199)
CO2 SERPL-SCNC: 24 MMOL/L (ref 21–32)
CREAT SERPL-MCNC: 0.6 MG/DL (ref 0.9–1.3)
GFR SERPLBLD CREATININE-BSD FMLA CKD-EPI: >90 ML/MIN/{1.73_M2}
GLUCOSE SERPL-MCNC: 219 MG/DL (ref 70–99)
HDLC SERPL-MCNC: 28 MG/DL (ref 40–60)
LDLC SERPL CALC-MCNC: 72 MG/DL
POTASSIUM SERPL-SCNC: 4.8 MMOL/L (ref 3.5–5.1)
PROT SERPL-MCNC: 6.7 G/DL (ref 6.4–8.2)
SODIUM SERPL-SCNC: 134 MMOL/L (ref 136–145)
TRIGL SERPL-MCNC: 125 MG/DL (ref 0–150)
VLDLC SERPL CALC-MCNC: 25 MG/DL

## 2024-09-20 RX ORDER — METOPROLOL SUCCINATE 25 MG/1
25 TABLET, EXTENDED RELEASE ORAL DAILY
Qty: 30 TABLET | Refills: 3 | Status: SHIPPED | OUTPATIENT
Start: 2024-09-20

## 2024-10-04 ENCOUNTER — HOSPITAL ENCOUNTER (OUTPATIENT)
Age: 59
Discharge: HOME | End: 2024-10-06
Payer: MEDICAID

## 2024-10-04 ENCOUNTER — HOSPITAL ENCOUNTER (OUTPATIENT)
Dept: NUCLEAR MEDICINE | Age: 59
Discharge: HOME | End: 2024-10-04
Payer: MEDICAID

## 2024-10-04 DIAGNOSIS — I20.9 ANGINA PECTORIS (HCC): ICD-10-CM

## 2024-10-04 LAB
NUC REST DIASTOLIC VOLUME INDEX: 83 ML/M2
NUC REST EJECTION FRACTION: 69 %
NUC REST SYSTOLIC VOLUME INDEX: 26 ML/M2
STRESS BASELINE DIAS BP: 74 MMHG
STRESS BASELINE HR: 86 BPM
STRESS BASELINE SYS BP: 120 MMHG
STRESS ESTIMATED WORKLOAD: 1 METS
STRESS EXERCISE DUR MIN: 1 MIN
STRESS EXERCISE DUR SEC: 40 SEC
STRESS PEAK DIAS BP: 77 MMHG
STRESS PEAK SYS BP: 130 MMHG
STRESS PERCENT HR ACHIEVED: 64 %
STRESS POST PEAK HR: 105 BPM
STRESS RATE PRESSURE PRODUCT: NORMAL BPM*MMHG
STRESS ST DEPRESSION: 0 MM
STRESS TARGET HR: 163 BPM

## 2024-10-04 PROCEDURE — 3430000000 HC RX DIAGNOSTIC RADIOPHARMACEUTICAL

## 2024-10-04 PROCEDURE — 78452 HT MUSCLE IMAGE SPECT MULT: CPT

## 2024-10-04 PROCEDURE — A9500 TC99M SESTAMIBI: HCPCS

## 2024-10-04 PROCEDURE — 6360000002 HC RX W HCPCS

## 2024-10-04 PROCEDURE — 93017 CV STRESS TEST TRACING ONLY: CPT

## 2024-10-04 PROCEDURE — A9502 TC99M TETROFOSMIN: HCPCS

## 2024-10-04 RX ORDER — TETRAKIS(2-METHOXYISOBUTYLISOCYANIDE)COPPER(I) TETRAFLUOROBORATE 1 MG/ML
15 INJECTION, POWDER, LYOPHILIZED, FOR SOLUTION INTRAVENOUS
Status: COMPLETED | OUTPATIENT
Start: 2024-10-04 | End: 2024-10-04

## 2024-10-04 RX ORDER — REGADENOSON 0.08 MG/ML
0.4 INJECTION, SOLUTION INTRAVENOUS
Status: COMPLETED | OUTPATIENT
Start: 2024-10-04 | End: 2024-10-04

## 2024-10-04 RX ADMIN — TETROFOSMIN 33.1 MILLICURIE: 1.38 INJECTION, POWDER, LYOPHILIZED, FOR SOLUTION INTRAVENOUS at 09:21

## 2024-10-04 RX ADMIN — TETRAKIS(2-METHOXYISOBUTYLISOCYANIDE)COPPER(I) TETRAFLUOROBORATE 15 MILLICURIE: 1 INJECTION, POWDER, LYOPHILIZED, FOR SOLUTION INTRAVENOUS at 07:41

## 2024-10-04 RX ADMIN — REGADENOSON 0.4 MG: 0.08 INJECTION, SOLUTION INTRAVENOUS at 09:15

## 2024-12-02 ENCOUNTER — HOSPITAL ENCOUNTER (OUTPATIENT)
Dept: WOUND CARE | Age: 59
Discharge: HOME | End: 2024-12-02
Payer: MEDICAID

## 2024-12-02 VITALS
RESPIRATION RATE: 18 BRPM | SYSTOLIC BLOOD PRESSURE: 129 MMHG | TEMPERATURE: 97.4 F | HEART RATE: 80 BPM | DIASTOLIC BLOOD PRESSURE: 75 MMHG

## 2024-12-02 DIAGNOSIS — L89.314 PRESSURE ULCER OF RIGHT BUTTOCK, STAGE 4 (HCC): Primary | ICD-10-CM

## 2024-12-02 DIAGNOSIS — G82.20 PARAPLEGIA (HCC): ICD-10-CM

## 2024-12-02 DIAGNOSIS — L89.324 PRESSURE ULCER OF LEFT BUTTOCK, STAGE 4 (HCC): ICD-10-CM

## 2024-12-02 PROCEDURE — 11043 DBRDMT MUSC&/FSCA 1ST 20/<: CPT

## 2024-12-02 PROCEDURE — 11046 DBRDMT MUSC&/FSCA EA ADDL: CPT

## 2024-12-02 PROCEDURE — 99213 OFFICE O/P EST LOW 20 MIN: CPT

## 2024-12-02 RX ORDER — TRIAMCINOLONE ACETONIDE 1 MG/G
OINTMENT TOPICAL ONCE
OUTPATIENT
Start: 2024-12-02 | End: 2024-12-02

## 2024-12-02 RX ORDER — LIDOCAINE HYDROCHLORIDE 20 MG/ML
JELLY TOPICAL ONCE
OUTPATIENT
Start: 2024-12-02 | End: 2024-12-02

## 2024-12-02 RX ORDER — SILVER SULFADIAZINE 10 MG/G
CREAM TOPICAL ONCE
OUTPATIENT
Start: 2024-12-02 | End: 2024-12-02

## 2024-12-02 RX ORDER — SODIUM CHLOR/HYPOCHLOROUS ACID 0.033 %
SOLUTION, IRRIGATION IRRIGATION ONCE
OUTPATIENT
Start: 2024-12-02 | End: 2024-12-02

## 2024-12-02 RX ORDER — BACITRACIN ZINC AND POLYMYXIN B SULFATE 500; 1000 [USP'U]/G; [USP'U]/G
OINTMENT TOPICAL ONCE
OUTPATIENT
Start: 2024-12-02 | End: 2024-12-02

## 2024-12-02 RX ORDER — LIDOCAINE 50 MG/G
OINTMENT TOPICAL ONCE
OUTPATIENT
Start: 2024-12-02 | End: 2024-12-02

## 2024-12-02 RX ORDER — LIDOCAINE 40 MG/G
CREAM TOPICAL ONCE
OUTPATIENT
Start: 2024-12-02 | End: 2024-12-02

## 2024-12-02 RX ORDER — NEOMYCIN/BACITRACIN/POLYMYXINB 3.5-400-5K
OINTMENT (GRAM) TOPICAL ONCE
OUTPATIENT
Start: 2024-12-02 | End: 2024-12-02

## 2024-12-02 RX ORDER — GENTAMICIN SULFATE 1 MG/G
OINTMENT TOPICAL ONCE
OUTPATIENT
Start: 2024-12-02 | End: 2024-12-02

## 2024-12-02 RX ORDER — GINSENG 100 MG
CAPSULE ORAL ONCE
OUTPATIENT
Start: 2024-12-02 | End: 2024-12-02

## 2024-12-02 RX ORDER — ATENOLOL 25 MG/1
25 TABLET ORAL DAILY
COMMUNITY

## 2024-12-02 RX ORDER — ACETAMINOPHEN 325 MG/1
650 TABLET ORAL EVERY 6 HOURS PRN
COMMUNITY

## 2024-12-02 RX ORDER — MUPIROCIN 20 MG/G
OINTMENT TOPICAL ONCE
OUTPATIENT
Start: 2024-12-02 | End: 2024-12-02

## 2024-12-02 RX ORDER — LIDOCAINE HYDROCHLORIDE 40 MG/ML
SOLUTION TOPICAL ONCE
Status: COMPLETED | OUTPATIENT
Start: 2024-12-02 | End: 2024-12-02

## 2024-12-02 RX ORDER — CLOBETASOL PROPIONATE 0.5 MG/G
OINTMENT TOPICAL ONCE
OUTPATIENT
Start: 2024-12-02 | End: 2024-12-02

## 2024-12-02 RX ORDER — M-VIT,TX,IRON,MINS/CALC/FOLIC 27MG-0.4MG
1 TABLET ORAL DAILY
COMMUNITY

## 2024-12-02 RX ORDER — BETAMETHASONE DIPROPIONATE 0.5 MG/G
CREAM TOPICAL ONCE
OUTPATIENT
Start: 2024-12-02 | End: 2024-12-02

## 2024-12-02 RX ORDER — LIDOCAINE HYDROCHLORIDE 40 MG/ML
SOLUTION TOPICAL ONCE
OUTPATIENT
Start: 2024-12-02 | End: 2024-12-02

## 2024-12-02 RX ADMIN — LIDOCAINE HYDROCHLORIDE: 40 SOLUTION TOPICAL at 15:16

## 2024-12-02 ASSESSMENT — PAIN SCALES - GENERAL
PAINLEVEL_OUTOF10: 0
PAINLEVEL_OUTOF10: 0

## 2024-12-02 NOTE — PATIENT INSTRUCTIONS
[facility] Wound Care Physician Orders and Discharge Instructions  [facility]  [address], [address]  [place], [place] [address]  Telephone: [phone]      FAX [phone]  MONDAY - THURSDAY 8:00 am - 4:30 pm and Friday 8:00 am - 12:00 pm.        NAME:  César Saunders  YOB: 1965  MEDICAL RECORD NUMBER:  [id number]  DATE:  12/2/2024      Return Appointment:  [x] Return Appointment: With Jose Angel Argueta CNP  in  2 Week(s)  [] Wound and dressing supply provider:   [x] ECF or Home Healthcare: [facility]   [] Wound Assessment: [] Physician or NP scheduled for Wound Assessment:   [] Orders placed during your visit:      Important Reminders:   Please wash hands with soap and water before and after every dressing change.  Do not scrub wounds.  Keep wounds dry in shower unless otherwise instructed by the physician.  SMOKING can slow would healing. Stop smoking as soon as possible to improve healing and prevent further complications associated with smoking.      Delmy-Wound Topical Treatments:  Do not apply lotions, creams, or ointments to wound bed unless directed.   [] Apply moisturizing lotion to skin surrounding the wound prior to dressing change.  [] Apply antifungal ointment to skin surrounding the wound prior to dressing change.  [] Apply thin film of no sting moisture barrier ointment to skin immediately around      wound.  [] Other:       Wound Location: RIGHT AND LEFT BUTTOCK WOUNDS    Wound Cleansing: VASHE X 5 MINUTE SOAK AT WOUND CARE CENTER ONLY    Primary Dressing:  [x] TRIAD PASTE TO  OPEN WOUNDS AND TO DELMY WOUND  []     Secondary Dressing:  [x] GAUZE TO OPEN AREAS ONLY  THEN INCONTINENCE DIAPER  []       Dressing Frequency:  [x] TWICE PER DAY  [] Do Not Change Dressing        Compression and Edema Control:  [] Wear Home Compression Stockings   [] Spandagrip to:    Size: []Low compression 5-10 mm/Hg      []Medium compression 10-20 mm/Hg           []High
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
## 2024-12-16 ENCOUNTER — HOSPITAL ENCOUNTER (OUTPATIENT)
Dept: WOUND CARE | Age: 59
Discharge: HOME | End: 2024-12-16
Payer: MEDICAID

## 2024-12-16 VITALS
TEMPERATURE: 97.4 F | HEART RATE: 70 BPM | SYSTOLIC BLOOD PRESSURE: 125 MMHG | RESPIRATION RATE: 20 BRPM | DIASTOLIC BLOOD PRESSURE: 80 MMHG

## 2024-12-16 DIAGNOSIS — L89.314 PRESSURE ULCER OF RIGHT BUTTOCK, STAGE 4 (HCC): ICD-10-CM

## 2024-12-16 DIAGNOSIS — L89.324 PRESSURE ULCER OF LEFT BUTTOCK, STAGE 4 (HCC): Primary | ICD-10-CM

## 2024-12-16 PROCEDURE — 11043 DBRDMT MUSC&/FSCA 1ST 20/<: CPT

## 2024-12-16 PROCEDURE — 11042 DBRDMT SUBQ TIS 1ST 20SQCM/<: CPT

## 2024-12-16 PROCEDURE — 11046 DBRDMT MUSC&/FSCA EA ADDL: CPT

## 2024-12-16 PROCEDURE — 11045 DBRDMT SUBQ TISS EACH ADDL: CPT

## 2024-12-16 RX ORDER — LIDOCAINE HYDROCHLORIDE 20 MG/ML
JELLY TOPICAL ONCE
OUTPATIENT
Start: 2024-12-16 | End: 2024-12-16

## 2024-12-16 RX ORDER — BETAMETHASONE DIPROPIONATE 0.5 MG/G
CREAM TOPICAL ONCE
OUTPATIENT
Start: 2024-12-16 | End: 2024-12-16

## 2024-12-16 RX ORDER — NEOMYCIN/BACITRACIN/POLYMYXINB 3.5-400-5K
OINTMENT (GRAM) TOPICAL ONCE
OUTPATIENT
Start: 2024-12-16 | End: 2024-12-16

## 2024-12-16 RX ORDER — GENTAMICIN SULFATE 1 MG/G
OINTMENT TOPICAL ONCE
OUTPATIENT
Start: 2024-12-16 | End: 2024-12-16

## 2024-12-16 RX ORDER — LIDOCAINE HYDROCHLORIDE 40 MG/ML
SOLUTION TOPICAL ONCE
OUTPATIENT
Start: 2024-12-16 | End: 2024-12-16

## 2024-12-16 RX ORDER — LIDOCAINE 40 MG/G
CREAM TOPICAL ONCE
OUTPATIENT
Start: 2024-12-16 | End: 2024-12-16

## 2024-12-16 RX ORDER — MUPIROCIN 20 MG/G
OINTMENT TOPICAL ONCE
OUTPATIENT
Start: 2024-12-16 | End: 2024-12-16

## 2024-12-16 RX ORDER — BACITRACIN ZINC AND POLYMYXIN B SULFATE 500; 1000 [USP'U]/G; [USP'U]/G
OINTMENT TOPICAL ONCE
OUTPATIENT
Start: 2024-12-16 | End: 2024-12-16

## 2024-12-16 RX ORDER — SILVER SULFADIAZINE 10 MG/G
CREAM TOPICAL ONCE
OUTPATIENT
Start: 2024-12-16 | End: 2024-12-16

## 2024-12-16 RX ORDER — LIDOCAINE HYDROCHLORIDE 40 MG/ML
SOLUTION TOPICAL ONCE
Status: COMPLETED | OUTPATIENT
Start: 2024-12-16 | End: 2024-12-16

## 2024-12-16 RX ORDER — LIDOCAINE 50 MG/G
OINTMENT TOPICAL ONCE
OUTPATIENT
Start: 2024-12-16 | End: 2024-12-16

## 2024-12-16 RX ORDER — SODIUM CHLOR/HYPOCHLOROUS ACID 0.033 %
SOLUTION, IRRIGATION IRRIGATION ONCE
OUTPATIENT
Start: 2024-12-16 | End: 2024-12-16

## 2024-12-16 RX ORDER — CLOBETASOL PROPIONATE 0.5 MG/G
OINTMENT TOPICAL ONCE
OUTPATIENT
Start: 2024-12-16 | End: 2024-12-16

## 2024-12-16 RX ORDER — GINSENG 100 MG
CAPSULE ORAL ONCE
OUTPATIENT
Start: 2024-12-16 | End: 2024-12-16

## 2024-12-16 RX ORDER — TRIAMCINOLONE ACETONIDE 1 MG/G
OINTMENT TOPICAL ONCE
OUTPATIENT
Start: 2024-12-16 | End: 2024-12-16

## 2024-12-16 RX ADMIN — LIDOCAINE HYDROCHLORIDE 5 ML: 40 SOLUTION TOPICAL at 14:37

## 2024-12-16 ASSESSMENT — PAIN SCALES - GENERAL: PAINLEVEL_OUTOF10: 0

## 2024-12-16 NOTE — PROGRESS NOTES
inhaler Inhale 2 puffs into the lungs every 6 hours as needed for Wheezing      cetirizine (ZYRTEC) 10 MG tablet Take 1 tablet by mouth daily      ezetimibe (ZETIA) 10 MG tablet Take 1 tablet by mouth daily      potassium chloride (KLOR-CON M) 20 MEQ extended release tablet Take 1 tablet by mouth daily      insulin glargine (LANTUS) 100 UNIT/ML injection vial Inject 45 Units into the skin nightly      furosemide (LASIX) 20 MG tablet Take 1.5 tablets by mouth 2 times daily      magnesium hydroxide (MILK OF MAGNESIA) 400 MG/5ML suspension Take 30 mLs by mouth daily as needed for Constipation      metFORMIN (GLUCOPHAGE) 1000 MG tablet Take 1 tablet by mouth 2 times daily (with meals)      simethicone (MYLICON) 80 MG chewable tablet Take 1 tablet by mouth every 4 hours as needed for Flatulence      spironolactone (ALDACTONE) 25 MG tablet Take 1 tablet by mouth daily      vitamin C (ASCORBIC ACID) 500 MG tablet Take 1 tablet by mouth daily      insulin aspart (NOVOLOG) 100 UNIT/ML injection vial Inject 12 Units into the skin 3 times daily (before meals)      vitamin D (ERGOCALCIFEROL) 1.25 MG (69669 UT) CAPS capsule Take 1 capsule by mouth every 30 days On the 18th      atorvastatin (LIPITOR) 80 MG tablet Take 1 tablet by mouth nightly      baclofen (LIORESAL) 10 MG tablet Take 1 tablet by mouth 2 times daily      gabapentin (NEURONTIN) 400 MG capsule Take 2 capsules by mouth 3 times daily.      loperamide (IMODIUM) 2 MG capsule Take 1 capsule by mouth 4 times daily as needed for Diarrhea      losartan (COZAAR) 25 MG tablet Take 1 tablet by mouth daily      oxyBUTYnin (DITROPAN-XL) 10 MG extended release tablet Take 1 tablet by mouth daily      oxyCODONE-acetaminophen (PERCOCET) 5-325 MG per tablet Take 1 tablet by mouth in the morning, at noon, and at bedtime.      traZODone (DESYREL) 50 MG tablet Take 1 tablet by mouth nightly       No current facility-administered medications on file prior to encounter.       REVIEW

## 2024-12-16 NOTE — PATIENT INSTRUCTIONS
Size: []Low compression 5-10 mm/Hg      []Medium compression 10-20 mm/Hg           []High compression  20-30 mm/Hg  [x] Ace Wrap Toes to Knee to  RIGHT AND LEFT LEG - REAPPLY DAILY PER NURSING STAFF  [] Multilayer Compression Wrap:  to    Do not get leg(s) with wrap wet.  If wraps become too tight call the center or completely remove the wrap.       Pressure Relief and Off Loading: LOW AIR LOSS MATTRESS.  HAS WHEELCHAIR CUSHION.  [x] Off-loading when [] walking  [x] in bed [x] sitting   Turn every 2 hours when in bed   Avoid putting direct pressure on the site of the wound. Limit side lying to 30 degree tilt. Limit elevating the head of the bed greater than 30 degrees.  LIMIT TIME IN CHAIR TO 4 HOURS TWICE PER DAY ( MORNING AND EVENING )                                      [x] Assistive Devices   MOTORIZED WHEELCHAIR  Use as instructed by the provider      Activity: Activity as Tolerated      Dietary:   Continue your diet as tolerated.  Protein is a key nutrient in helping to repair damaged tissue and promote new tissue growth. Good sources of protein include milk, yogurt, cheese, fish, lean meat and beans.  If you are DIABETIC, having diabetes can make it hard for wounds to heal. Try to keep your blood sugar within it's target range.  Limit Sodium, Alcohol and Sugar.    Pain:   Please Note some pain, drainage and/or bleeding might be expected after seeing the provider. TO HELP ALLEVIATE PAIN WE RECOMMEND THE FOLLOWING  Elevate the affected limb.  Use over the counter medications as permitted by your family doctor.  For Persistent Pain not relieved by the above interventions, please notify your family doctor.        : ANGÉLICA    Electronically signed by Angélica Brennan RN on 12/16/2024 at 2:51 PM       Wound Care Center Information: Should you experience any significant changes in your wound(s) or have questions about your wound care, please contact the [facility]ound Center at [phone] MONDAY -

## 2024-12-30 ENCOUNTER — TELEPHONE (OUTPATIENT)
Dept: WOUND CARE | Age: 59
End: 2024-12-30

## 2024-12-30 ENCOUNTER — HOSPITAL ENCOUNTER (OUTPATIENT)
Dept: WOUND CARE | Age: 59
Discharge: HOME | End: 2024-12-30
Payer: MEDICAID

## 2024-12-30 VITALS
HEART RATE: 96 BPM | TEMPERATURE: 97.4 F | SYSTOLIC BLOOD PRESSURE: 123 MMHG | DIASTOLIC BLOOD PRESSURE: 66 MMHG | RESPIRATION RATE: 20 BRPM

## 2024-12-30 DIAGNOSIS — L89.314 PRESSURE ULCER OF RIGHT BUTTOCK, STAGE 4 (HCC): Primary | ICD-10-CM

## 2024-12-30 DIAGNOSIS — L89.324 PRESSURE ULCER OF LEFT BUTTOCK, STAGE 4 (HCC): Primary | ICD-10-CM

## 2024-12-30 DIAGNOSIS — L89.314 PRESSURE ULCER OF RIGHT BUTTOCK, STAGE 4 (HCC): ICD-10-CM

## 2024-12-30 DIAGNOSIS — L89.324 PRESSURE ULCER OF LEFT BUTTOCK, STAGE 4 (HCC): ICD-10-CM

## 2024-12-30 PROCEDURE — 11046 DBRDMT MUSC&/FSCA EA ADDL: CPT

## 2024-12-30 PROCEDURE — 11043 DBRDMT MUSC&/FSCA 1ST 20/<: CPT

## 2024-12-30 PROCEDURE — 11042 DBRDMT SUBQ TIS 1ST 20SQCM/<: CPT

## 2024-12-30 PROCEDURE — 11045 DBRDMT SUBQ TISS EACH ADDL: CPT

## 2024-12-30 RX ORDER — LIDOCAINE HYDROCHLORIDE 40 MG/ML
SOLUTION TOPICAL ONCE
Status: COMPLETED | OUTPATIENT
Start: 2024-12-30 | End: 2024-12-30

## 2024-12-30 RX ORDER — GENTAMICIN SULFATE 1 MG/G
OINTMENT TOPICAL ONCE
OUTPATIENT
Start: 2024-12-30 | End: 2024-12-30

## 2024-12-30 RX ORDER — LIDOCAINE HYDROCHLORIDE 20 MG/ML
JELLY TOPICAL ONCE
OUTPATIENT
Start: 2024-12-30 | End: 2024-12-30

## 2024-12-30 RX ORDER — TRIAMCINOLONE ACETONIDE 1 MG/G
OINTMENT TOPICAL ONCE
OUTPATIENT
Start: 2024-12-30 | End: 2024-12-30

## 2024-12-30 RX ORDER — LIDOCAINE 40 MG/G
CREAM TOPICAL ONCE
OUTPATIENT
Start: 2024-12-30 | End: 2024-12-30

## 2024-12-30 RX ORDER — NEOMYCIN/BACITRACIN/POLYMYXINB 3.5-400-5K
OINTMENT (GRAM) TOPICAL ONCE
OUTPATIENT
Start: 2024-12-30 | End: 2024-12-30

## 2024-12-30 RX ORDER — SODIUM CHLOR/HYPOCHLOROUS ACID 0.033 %
SOLUTION, IRRIGATION IRRIGATION ONCE
OUTPATIENT
Start: 2024-12-30 | End: 2024-12-30

## 2024-12-30 RX ORDER — CLOBETASOL PROPIONATE 0.5 MG/G
OINTMENT TOPICAL ONCE
OUTPATIENT
Start: 2024-12-30 | End: 2024-12-30

## 2024-12-30 RX ORDER — BETAMETHASONE DIPROPIONATE 0.5 MG/G
CREAM TOPICAL ONCE
OUTPATIENT
Start: 2024-12-30 | End: 2024-12-30

## 2024-12-30 RX ORDER — GINSENG 100 MG
CAPSULE ORAL ONCE
OUTPATIENT
Start: 2024-12-30 | End: 2024-12-30

## 2024-12-30 RX ORDER — LIDOCAINE 50 MG/G
OINTMENT TOPICAL ONCE
OUTPATIENT
Start: 2024-12-30 | End: 2024-12-30

## 2024-12-30 RX ORDER — MUPIROCIN 20 MG/G
OINTMENT TOPICAL ONCE
OUTPATIENT
Start: 2024-12-30 | End: 2024-12-30

## 2024-12-30 RX ORDER — LIDOCAINE HYDROCHLORIDE 40 MG/ML
SOLUTION TOPICAL ONCE
OUTPATIENT
Start: 2024-12-30 | End: 2024-12-30

## 2024-12-30 RX ORDER — BACITRACIN ZINC AND POLYMYXIN B SULFATE 500; 1000 [USP'U]/G; [USP'U]/G
OINTMENT TOPICAL ONCE
OUTPATIENT
Start: 2024-12-30 | End: 2024-12-30

## 2024-12-30 RX ORDER — SILVER SULFADIAZINE 10 MG/G
CREAM TOPICAL ONCE
OUTPATIENT
Start: 2024-12-30 | End: 2024-12-30

## 2024-12-30 RX ADMIN — LIDOCAINE HYDROCHLORIDE: 40 SOLUTION TOPICAL at 13:35

## 2024-12-30 ASSESSMENT — PAIN SCALES - GENERAL: PAINLEVEL_OUTOF10: 0

## 2024-12-30 NOTE — PATIENT INSTRUCTIONS
[place] at [phone] MONDAY - THURSDAY 8:00 am - 4:30 pm and Friday 8:00 am - 12:30 pm.  If you need help with your wound outside these hours and cannot wait until we are again available, contact your PCP or go to the hospital emergency room.     PLEASE NOTE: IF YOU ARE UNABLE TO OBTAIN WOUND SUPPLIES, CONTINUE TO USE THE SUPPLIES YOU HAVE AVAILABLE UNTIL YOU ARE ABLE TO REACH US. IT IS MOST IMPORTANT TO KEEP THE WOUND COVERED AT ALL TIMES.         Physician Signature:_______________________    Date: ___________ Time:  ____________          Jose Angel Argueta CNP

## 2024-12-30 NOTE — PROGRESS NOTES
[facility]     Note Type: Medical Staff Progress Note    Referring Provider: TRAVIS Bruno *  Reason for Referral: Bilateral buttock wounds    César Saunders  MEDICAL RECORD NUMBER:  [id number]  AGE: 59 y.o.   GENDER: male  : 1965  EPISODE DATE:  2024    Chief complaint and reason for visit:     Chief Complaint   Patient presents with    Wound Check     Follow up for buttock wounds         HPI/Wound Narrative:      César Saunders is a 59 y.o. male who presents today for an evaluation of a wound/ulcer. Wound duration:  2-3 year(s).  Patient presents for returning wound care visit for bilateral chronic gluteal wounds.  Patient resides at [facility] on the medical unit for assistance with dressing changes.  Patient paraplegic and uses a motorized scooter with a high level seating cushion.  History of diabetes, hypertension, obesity, and chronic pressure injuries.  Current treatment: unknown.  He denies pain, fever, chills and/or nausea/vomiting    Wound/Ulcer Pain Timing/Severity: none  Quality of pain: N/A  Severity:  0 / 10   Modifying Factors: None  Associated Signs/Symptoms: none    2024: Patient states that he is doing his own dressing applying Triad paste to buttocks independently.  Orders sent to [place] for nursing staff to perform dressing changes to buttocks and bilateral lower extremities twice a day and daily.  Treatment changed to alginate with silver and Triad paste to periwound no tape secure with ABD and depends.  Follow-up in 2 weeks.    2024: No issues or concerns reported.  No overt signs of infection or constitutional issues.  Continue current treatment.  Follow-up in 2 weeks.  Medical Decision Making:     Historian(s): patient .     Ulcer Identification:  Ulcer Type: pressure    Contributing Factors: edema, venous stasis, diabetes, chronic pressure, decreased mobility, obesity, incontinence of stool,

## 2024-12-30 NOTE — TELEPHONE ENCOUNTER
Spoke with Jose, the nurse at [facility] in regards to César's care.     Reviewed wound orders that were changed to Triad Cream to rajendra wound and Silver Alginate to wounds and need for daily Ace wraps to Bilateral Lower Legs. She expressed that the patient does often refuse Ace Wraps to be placed on night shift as ordered. Will continue to stress the importance of Ace wraps to patient.

## 2025-01-13 ENCOUNTER — HOSPITAL ENCOUNTER (OUTPATIENT)
Dept: WOUND CARE | Age: 60
Discharge: HOME | End: 2025-01-13
Payer: MEDICAID

## 2025-01-13 VITALS
RESPIRATION RATE: 18 BRPM | DIASTOLIC BLOOD PRESSURE: 92 MMHG | HEART RATE: 76 BPM | SYSTOLIC BLOOD PRESSURE: 139 MMHG | TEMPERATURE: 97.3 F

## 2025-01-13 DIAGNOSIS — L89.324 PRESSURE ULCER OF LEFT BUTTOCK, STAGE 4 (HCC): Primary | ICD-10-CM

## 2025-01-13 DIAGNOSIS — L89.314 PRESSURE ULCER OF RIGHT BUTTOCK, STAGE 4 (HCC): ICD-10-CM

## 2025-01-13 PROCEDURE — 11043 DBRDMT MUSC&/FSCA 1ST 20/<: CPT

## 2025-01-13 PROCEDURE — 11046 DBRDMT MUSC&/FSCA EA ADDL: CPT

## 2025-01-13 RX ORDER — LIDOCAINE HYDROCHLORIDE 40 MG/ML
SOLUTION TOPICAL ONCE
Status: COMPLETED | OUTPATIENT
Start: 2025-01-13 | End: 2025-01-13

## 2025-01-13 RX ORDER — LIDOCAINE HYDROCHLORIDE 40 MG/ML
SOLUTION TOPICAL ONCE
OUTPATIENT
Start: 2025-01-13 | End: 2025-01-13

## 2025-01-13 RX ORDER — GINSENG 100 MG
CAPSULE ORAL ONCE
OUTPATIENT
Start: 2025-01-13 | End: 2025-01-13

## 2025-01-13 RX ORDER — LIDOCAINE HYDROCHLORIDE 20 MG/ML
JELLY TOPICAL ONCE
OUTPATIENT
Start: 2025-01-13 | End: 2025-01-13

## 2025-01-13 RX ORDER — NEOMYCIN/BACITRACIN/POLYMYXINB 3.5-400-5K
OINTMENT (GRAM) TOPICAL ONCE
OUTPATIENT
Start: 2025-01-13 | End: 2025-01-13

## 2025-01-13 RX ORDER — LIDOCAINE 40 MG/G
CREAM TOPICAL ONCE
OUTPATIENT
Start: 2025-01-13 | End: 2025-01-13

## 2025-01-13 RX ORDER — BACITRACIN ZINC AND POLYMYXIN B SULFATE 500; 1000 [USP'U]/G; [USP'U]/G
OINTMENT TOPICAL ONCE
OUTPATIENT
Start: 2025-01-13 | End: 2025-01-13

## 2025-01-13 RX ORDER — MUPIROCIN 20 MG/G
OINTMENT TOPICAL ONCE
OUTPATIENT
Start: 2025-01-13 | End: 2025-01-13

## 2025-01-13 RX ORDER — BETAMETHASONE DIPROPIONATE 0.5 MG/G
CREAM TOPICAL ONCE
OUTPATIENT
Start: 2025-01-13 | End: 2025-01-13

## 2025-01-13 RX ORDER — LIDOCAINE 50 MG/G
OINTMENT TOPICAL ONCE
OUTPATIENT
Start: 2025-01-13 | End: 2025-01-13

## 2025-01-13 RX ORDER — CLOBETASOL PROPIONATE 0.5 MG/G
OINTMENT TOPICAL ONCE
OUTPATIENT
Start: 2025-01-13 | End: 2025-01-13

## 2025-01-13 RX ORDER — SILVER SULFADIAZINE 10 MG/G
CREAM TOPICAL ONCE
OUTPATIENT
Start: 2025-01-13 | End: 2025-01-13

## 2025-01-13 RX ORDER — TRIAMCINOLONE ACETONIDE 1 MG/G
OINTMENT TOPICAL ONCE
OUTPATIENT
Start: 2025-01-13 | End: 2025-01-13

## 2025-01-13 RX ORDER — GENTAMICIN SULFATE 1 MG/G
OINTMENT TOPICAL ONCE
OUTPATIENT
Start: 2025-01-13 | End: 2025-01-13

## 2025-01-13 RX ORDER — SODIUM CHLOR/HYPOCHLOROUS ACID 0.033 %
SOLUTION, IRRIGATION IRRIGATION ONCE
OUTPATIENT
Start: 2025-01-13 | End: 2025-01-13

## 2025-01-13 RX ADMIN — LIDOCAINE HYDROCHLORIDE: 40 SOLUTION TOPICAL at 14:11

## 2025-01-13 ASSESSMENT — PAIN SCALES - GENERAL: PAINLEVEL_OUTOF10: 0

## 2025-01-13 NOTE — PROGRESS NOTES
[facility]     Note Type: Medical Staff Progress Note    Referring Provider: TRAVIS Bruno *  Reason for Referral: Bilateral buttock wounds    César Saunders  MEDICAL RECORD NUMBER:  [id number]  AGE: 59 y.o.   GENDER: male  : 1965  EPISODE DATE:  2025    Chief complaint and reason for visit:     Chief Complaint   Patient presents with    Wound Check     Follow up buttocks.         HPI/Wound Narrative:      César Saunders is a 59 y.o. male who presents today for an evaluation of a wound/ulcer. Wound duration:  2-3 year(s).  Patient presents for returning wound care visit for bilateral chronic gluteal wounds.  [facility] [place] [facility] [place] unit for assistance with dressing changes.  Patient paraplegic and uses a motorized scooter with a high level seating cushion.  History of diabetes, hypertension, obesity, and chronic pressure injuries.  Current treatment: unknown.  He denies pain, fever, chills and/or nausea/vomiting    Wound/Ulcer Pain Timing/Severity: none  Quality of pain: N/A  Severity:  0 / 10   Modifying Factors: None  Associated Signs/Symptoms: none    2025: Patient states he is doing his own wound care dressings independently and nursing staff assisting occasionally with dressing changes but not consistently.  No overt signs of infection or constitutional issues.  Continue current treatment with alginate with silver and Triad paste to periwound skin with dry ABD and depends.  Patient could benefit from colostomy.  At next visit will discuss general surgery referral for loop colostomy to divert stool away from wounds for adequate wound healing.  Follow-up in 2 weeks.    2024: Patient states that he is doing his own dressing applying Triad paste to buttocks independently.  Orders sent to [place] for nursing staff to perform dressing changes to buttocks and bilateral lower extremities twice a day and

## 2025-01-13 NOTE — PATIENT INSTRUCTIONS
Scrotum- Pressure Stage 3- 0.8 cm x 0.3 cm x 0.1 cm    Wound Cleansing: VASHE X 5 MINUTE SOAK AT WOUND CARE CENTER ONLY    Primary Dressing:  [x] TRIAD PASTE TO DELMY WOUND  []     Secondary Dressing:  [x]ABD PAD   [x] BRIEF      Dressing Frequency:  [x] TWICE A DAY FOR TRIAD PASTE    Compression and Edema Control:  [] Wear Home Compression Stockings   [] Spandagrip to:    Size: []Low compression 5-10 mm/Hg      []Medium compression 10-20 mm/Hg           []High compression  20-30 mm/Hg  [x] Ace Wrap Toes to Knee to  RIGHT AND LEFT LEG - REAPPLY DAILY PER NURSING STAFF  [] Multilayer Compression Wrap:  to    Do not get leg(s) with wrap wet.  If wraps become too tight call the center or completely remove the wrap.       Pressure Relief and Off Loading: LOW AIR LOSS MATTRESS.  HAS WHEELCHAIR CUSHION.  [x] Off-loading when [] walking  [x] in bed [x] sitting   Turn every 2 hours when in bed   Avoid putting direct pressure on the site of the wound. Limit side lying to 30 degree tilt. Limit elevating the head of the bed greater than 30 degrees.  LIMIT TIME IN CHAIR TO 4 HOURS TWICE PER DAY ( MORNING AND EVENING )                                      [x] Assistive Devices   MOTORIZED WHEELCHAIR  Use as instructed by the provider      Activity: Activity as Tolerated      Dietary:   Continue your diet as tolerated.  Protein is a key nutrient in helping to repair damaged tissue and promote new tissue growth. Good sources of protein include milk, yogurt, cheese, fish, lean meat and beans.  If you are DIABETIC, having diabetes can make it hard for wounds to heal. Try to keep your blood sugar within it's target range.  Limit Sodium, Alcohol and Sugar.    Pain:   Please Note some pain, drainage and/or bleeding might be expected after seeing the provider. TO HELP ALLEVIATE PAIN WE RECOMMEND THE FOLLOWING  Elevate the affected limb.  Use over the counter medications as permitted by your family doctor.  For Persistent Pain not

## 2025-01-27 ENCOUNTER — HOSPITAL ENCOUNTER (OUTPATIENT)
Dept: WOUND CARE | Age: 60
Discharge: HOME | End: 2025-01-27
Payer: MEDICAID

## 2025-01-27 VITALS
SYSTOLIC BLOOD PRESSURE: 115 MMHG | TEMPERATURE: 96.8 F | DIASTOLIC BLOOD PRESSURE: 64 MMHG | RESPIRATION RATE: 18 BRPM | HEART RATE: 64 BPM

## 2025-01-27 DIAGNOSIS — L89.324 PRESSURE ULCER OF LEFT BUTTOCK, STAGE 4 (HCC): Primary | ICD-10-CM

## 2025-01-27 DIAGNOSIS — L89.314 PRESSURE ULCER OF RIGHT BUTTOCK, STAGE 4 (HCC): ICD-10-CM

## 2025-01-27 PROCEDURE — 11046 DBRDMT MUSC&/FSCA EA ADDL: CPT | Performed by: NURSE PRACTITIONER

## 2025-01-27 PROCEDURE — 11043 DBRDMT MUSC&/FSCA 1ST 20/<: CPT

## 2025-01-27 PROCEDURE — 11046 DBRDMT MUSC&/FSCA EA ADDL: CPT

## 2025-01-27 PROCEDURE — 11043 DBRDMT MUSC&/FSCA 1ST 20/<: CPT | Performed by: NURSE PRACTITIONER

## 2025-01-27 RX ORDER — LIDOCAINE HYDROCHLORIDE 40 MG/ML
SOLUTION TOPICAL ONCE
OUTPATIENT
Start: 2025-01-27 | End: 2025-01-27

## 2025-01-27 RX ORDER — GENTAMICIN SULFATE 1 MG/G
OINTMENT TOPICAL ONCE
OUTPATIENT
Start: 2025-01-27 | End: 2025-01-27

## 2025-01-27 RX ORDER — LIDOCAINE 40 MG/G
CREAM TOPICAL ONCE
OUTPATIENT
Start: 2025-01-27 | End: 2025-01-27

## 2025-01-27 RX ORDER — MUPIROCIN 20 MG/G
OINTMENT TOPICAL ONCE
OUTPATIENT
Start: 2025-01-27 | End: 2025-01-27

## 2025-01-27 RX ORDER — LIDOCAINE HYDROCHLORIDE 40 MG/ML
SOLUTION TOPICAL ONCE
Status: COMPLETED | OUTPATIENT
Start: 2025-01-27 | End: 2025-01-27

## 2025-01-27 RX ORDER — SODIUM CHLOR/HYPOCHLOROUS ACID 0.033 %
SOLUTION, IRRIGATION IRRIGATION ONCE
OUTPATIENT
Start: 2025-01-27 | End: 2025-01-27

## 2025-01-27 RX ORDER — LIDOCAINE HYDROCHLORIDE 20 MG/ML
JELLY TOPICAL ONCE
OUTPATIENT
Start: 2025-01-27 | End: 2025-01-27

## 2025-01-27 RX ORDER — NEOMYCIN/BACITRACIN/POLYMYXINB 3.5-400-5K
OINTMENT (GRAM) TOPICAL ONCE
OUTPATIENT
Start: 2025-01-27 | End: 2025-01-27

## 2025-01-27 RX ORDER — TRIAMCINOLONE ACETONIDE 1 MG/G
OINTMENT TOPICAL ONCE
OUTPATIENT
Start: 2025-01-27 | End: 2025-01-27

## 2025-01-27 RX ORDER — BETAMETHASONE DIPROPIONATE 0.5 MG/G
CREAM TOPICAL ONCE
OUTPATIENT
Start: 2025-01-27 | End: 2025-01-27

## 2025-01-27 RX ORDER — CLOBETASOL PROPIONATE 0.5 MG/G
OINTMENT TOPICAL ONCE
OUTPATIENT
Start: 2025-01-27 | End: 2025-01-27

## 2025-01-27 RX ORDER — GINSENG 100 MG
CAPSULE ORAL ONCE
OUTPATIENT
Start: 2025-01-27 | End: 2025-01-27

## 2025-01-27 RX ORDER — BACITRACIN ZINC AND POLYMYXIN B SULFATE 500; 1000 [USP'U]/G; [USP'U]/G
OINTMENT TOPICAL ONCE
OUTPATIENT
Start: 2025-01-27 | End: 2025-01-27

## 2025-01-27 RX ORDER — LIDOCAINE 50 MG/G
OINTMENT TOPICAL ONCE
OUTPATIENT
Start: 2025-01-27 | End: 2025-01-27

## 2025-01-27 RX ORDER — SILVER SULFADIAZINE 10 MG/G
CREAM TOPICAL ONCE
OUTPATIENT
Start: 2025-01-27 | End: 2025-01-27

## 2025-01-27 RX ADMIN — LIDOCAINE HYDROCHLORIDE: 40 SOLUTION TOPICAL at 13:37

## 2025-01-27 ASSESSMENT — PAIN SCALES - GENERAL: PAINLEVEL_OUTOF10: 0

## 2025-01-27 NOTE — PROGRESS NOTES
[facility]     Note Type: Medical Staff Progress Note    Referring Provider: TRAVIS Hearn -*  Reason for Referral: Bilateral buttock wounds    César Saunders  MEDICAL RECORD NUMBER:  [id number]  AGE: 59 y.o.   GENDER: male  : 1965  EPISODE DATE:  2025    Chief complaint and reason for visit:     Chief Complaint   Patient presents with    Wound Check     Follow up for buttocks wounds.         HPI/Wound Narrative:      César Saunders is a 59 y.o. male who presents today for an evaluation of a wound/ulcer. Wound duration:  2-3 year(s).  Patient presents for returning wound care visit for bilateral chronic gluteal wounds.  Patient resides at [facility] on the medical unit for assistance with dressing changes.  Patient paraplegic and uses a motorized scooter with a high level seating cushion.  History of diabetes, hypertension, obesity, and chronic pressure injuries.  Current treatment: unknown.  He denies pain, fever, chills and/or nausea/vomiting    Wound/Ulcer Pain Timing/Severity: none  Quality of pain: N/A  Severity:  0 / 10   Modifying Factors: None  Associated Signs/Symptoms: none    2025:  No significant change in wound.  No overt signs of infection or constitutional issues.  Continue current plan of care with alginate with silver and Triad paste to periwound.  Cover with dry ABD pad and Depends.  Follow-up in 2 weeks.    2025: Patient states he is doing his own wound care dressings independently and nursing staff assisting occasionally with dressing changes but not consistently.  No overt signs of infection or constitutional issues.  Continue current treatment with alginate with silver and Triad paste to periwound skin with dry ABD and depends.  Patient could benefit from colostomy.  At next visit will discuss general surgery referral for loop colostomy to divert stool away from wounds for adequate wound healing.

## 2025-01-27 NOTE — PATIENT INSTRUCTIONS
[facility] Wound Care Physician Orders and Discharge Instructions  [facility]  [address], [address]  [place], [place] [address]  Telephone: [phone]      FAX [phone]  MONDAY - THURSDAY 8:00 am - 4:30 pm and Friday 8:00 am - 12:00 pm.        NAME:  César Saunders  YOB: 1965  MEDICAL RECORD NUMBER:  [id number]  DATE:  1/27/2025      Return Appointment:  [x] Return Appointment: With Jose Angel Argueta CNP  in  2 Week(s) PER PROVIDER   [] Wound and dressing supply provider:   [x] ECF or Home Healthcare: [facility]   [] Wound Assessment: [] Physician or NP scheduled for Wound Assessment:   [] Orders placed during your visit:      Important Reminders:   Please wash hands with soap and water before and after every dressing change.  Do not scrub wounds.  Keep wounds dry in shower unless otherwise instructed by the physician.  SMOKING can slow would healing. Stop smoking as soon as possible to improve healing and prevent further complications associated with smoking.      Delmy-Wound Topical Treatments:  Do not apply lotions, creams, or ointments to wound bed unless directed.   [] Apply moisturizing lotion to skin surrounding the wound prior to dressing change.  [] Apply antifungal ointment to skin surrounding the wound prior to dressing change.  [] Apply thin film of no sting moisture barrier ointment to skin immediately around      wound.  [] Other:       Wound Location: RIGHT AND LEFT BUTTOCK WOUNDS    1/27/2024   Pressure Stage 4- Right Buttock 8.5 cm x 4.5 cm x 0.5 cm;   Pressure Stage 4- Left Buttock  4.5 cm x 5 cm x 0.4 cm;       Wound Cleansing: VASHE X 5 MINUTE SOAK AT WOUND CARE CENTER ONLY    Primary Dressing:  [x] TRIAD PASTE TO DELMY WOUND  [x] SILVER ALGINATE TO OPEN WOUNDS-     Secondary Dressing:  [x]ABD PAD   [x] BRIEF      Dressing Frequency:  [x] TWICE A DAY FOR TRIAD PASTE; EVERY OTHER DAY FOR SILVER ALGINATE    Wound Location: SCROTUM    1/27/2024

## 2025-02-10 ENCOUNTER — HOSPITAL ENCOUNTER (OUTPATIENT)
Dept: WOUND CARE | Age: 60
Discharge: HOME | End: 2025-02-10
Payer: MEDICAID

## 2025-02-10 VITALS
TEMPERATURE: 97.2 F | RESPIRATION RATE: 18 BRPM | HEART RATE: 74 BPM | SYSTOLIC BLOOD PRESSURE: 121 MMHG | DIASTOLIC BLOOD PRESSURE: 77 MMHG

## 2025-02-10 DIAGNOSIS — G82.20 PARAPLEGIA (HCC): ICD-10-CM

## 2025-02-10 DIAGNOSIS — L89.324 PRESSURE ULCER OF LEFT BUTTOCK, STAGE 4 (HCC): Primary | ICD-10-CM

## 2025-02-10 DIAGNOSIS — L89.314 PRESSURE ULCER OF RIGHT BUTTOCK, STAGE 4 (HCC): ICD-10-CM

## 2025-02-10 PROCEDURE — 11043 DBRDMT MUSC&/FSCA 1ST 20/<: CPT

## 2025-02-10 PROCEDURE — 11046 DBRDMT MUSC&/FSCA EA ADDL: CPT

## 2025-02-10 RX ORDER — GINSENG 100 MG
CAPSULE ORAL ONCE
OUTPATIENT
Start: 2025-02-10 | End: 2025-02-10

## 2025-02-10 RX ORDER — BETAMETHASONE DIPROPIONATE 0.5 MG/G
CREAM TOPICAL ONCE
OUTPATIENT
Start: 2025-02-10 | End: 2025-02-10

## 2025-02-10 RX ORDER — TRIAMCINOLONE ACETONIDE 1 MG/G
OINTMENT TOPICAL ONCE
OUTPATIENT
Start: 2025-02-10 | End: 2025-02-10

## 2025-02-10 RX ORDER — LIDOCAINE HYDROCHLORIDE 20 MG/ML
JELLY TOPICAL ONCE
OUTPATIENT
Start: 2025-02-10 | End: 2025-02-10

## 2025-02-10 RX ORDER — LIDOCAINE 40 MG/G
CREAM TOPICAL ONCE
OUTPATIENT
Start: 2025-02-10 | End: 2025-02-10

## 2025-02-10 RX ORDER — CLOBETASOL PROPIONATE 0.5 MG/G
OINTMENT TOPICAL ONCE
OUTPATIENT
Start: 2025-02-10 | End: 2025-02-10

## 2025-02-10 RX ORDER — GENTAMICIN SULFATE 1 MG/G
OINTMENT TOPICAL ONCE
OUTPATIENT
Start: 2025-02-10 | End: 2025-02-10

## 2025-02-10 RX ORDER — SODIUM CHLOR/HYPOCHLOROUS ACID 0.033 %
SOLUTION, IRRIGATION IRRIGATION ONCE
OUTPATIENT
Start: 2025-02-10 | End: 2025-02-10

## 2025-02-10 RX ORDER — LIDOCAINE HYDROCHLORIDE 40 MG/ML
SOLUTION TOPICAL ONCE
OUTPATIENT
Start: 2025-02-10 | End: 2025-02-10

## 2025-02-10 RX ORDER — BACITRACIN ZINC AND POLYMYXIN B SULFATE 500; 1000 [USP'U]/G; [USP'U]/G
OINTMENT TOPICAL ONCE
OUTPATIENT
Start: 2025-02-10 | End: 2025-02-10

## 2025-02-10 RX ORDER — LIDOCAINE 50 MG/G
OINTMENT TOPICAL ONCE
OUTPATIENT
Start: 2025-02-10 | End: 2025-02-10

## 2025-02-10 RX ORDER — NEOMYCIN/BACITRACIN/POLYMYXINB 3.5-400-5K
OINTMENT (GRAM) TOPICAL ONCE
OUTPATIENT
Start: 2025-02-10 | End: 2025-02-10

## 2025-02-10 RX ORDER — MUPIROCIN 20 MG/G
OINTMENT TOPICAL ONCE
OUTPATIENT
Start: 2025-02-10 | End: 2025-02-10

## 2025-02-10 RX ORDER — SILVER SULFADIAZINE 10 MG/G
CREAM TOPICAL ONCE
OUTPATIENT
Start: 2025-02-10 | End: 2025-02-10

## 2025-02-10 RX ORDER — LIDOCAINE HYDROCHLORIDE 40 MG/ML
SOLUTION TOPICAL ONCE
Status: COMPLETED | OUTPATIENT
Start: 2025-02-10 | End: 2025-02-10

## 2025-02-10 RX ADMIN — LIDOCAINE HYDROCHLORIDE: 40 SOLUTION TOPICAL at 14:12

## 2025-02-10 ASSESSMENT — PAIN SCALES - GENERAL: PAINLEVEL_OUTOF10: 0

## 2025-02-10 NOTE — PATIENT INSTRUCTIONS
[facility] Wound Care Physician Orders and Discharge Instructions  [facility]  [address], [address]  [place], [place] [address]  Telephone: [phone]      FAX [phone]  MONDAY - THURSDAY 8:00 am - 4:30 pm and Friday 8:00 am - 12:00 pm.        NAME:  César Saunders  YOB: 1965  MEDICAL RECORD NUMBER:  [id number]  DATE:  2/10/2025      Return Appointment:  [x] Return Appointment: With Jose Angel Argueta CNP  in  1 Week(s) PER PROVIDER   [] Wound and dressing supply provider:   [x] ECF or Home Healthcare: [facility]   [] Wound Assessment: [] Physician or NP scheduled for Wound Assessment:   [] Orders placed during your visit:      Important Reminders:   Please wash hands with soap and water before and after every dressing change.  Do not scrub wounds.  Keep wounds dry in shower unless otherwise instructed by the physician.  SMOKING can slow would healing. Stop smoking as soon as possible to improve healing and prevent further complications associated with smoking.      Delmy-Wound Topical Treatments:  Do not apply lotions, creams, or ointments to wound bed unless directed.   [] Apply moisturizing lotion to skin surrounding the wound prior to dressing change.  [] Apply antifungal ointment to skin surrounding the wound prior to dressing change.  [x] Apply thin film of no sting moisture barrier ointment to skin immediately around  wound. (Skin Prep)  [] Other:       Wound Location: RIGHT AND LEFT BUTTOCK WOUNDS    2/10/2024   Pressure Stage 4- Right Buttock 7.5 cm x 2 cm x 0.5 cm;   Pressure Stage 4- Left Buttock  4 cm x 5 cm x 0.4 cm;       Wound Cleansing: VASHE X 5 MINUTE SOAK AT WOUND CARE CENTER ONLY    Primary Dressing:  [x] TRIAD PASTE TO DELMY WOUND  [x] HYDROFERA BLUE TRANSFER TO OPEN WOUNDS    Secondary Dressing:  [x]ABD PAD   [x] Medipore Tape ONLY      Dressing Frequency:  [x] TWICE A DAY FOR TRIAD PASTE; EVERY OTHER DAY FOR HYDROFERA BLUE TRANSFER    Wound

## 2025-02-10 NOTE — PROGRESS NOTES
Buttock  4 cm x 5 cm x 0.4 cm;       Wound Cleansing: VASHE X 5 MINUTE SOAK AT WOUND CARE CENTER ONLY    Primary Dressing:  [x] TRIAD PASTE TO DELMY WOUND  [x] HYDROFERA BLUE TRANSFER TO OPEN WOUNDS    Secondary Dressing:  [x]ABD PAD   [x] Medipore Tape ONLY      Dressing Frequency:  [x] TWICE A DAY FOR TRIAD PASTE; EVERY OTHER DAY FOR HYDROFERA BLUE TRANSFER    Wound Location: SCROTUM    2/10/2024 Scrotum- Pressure Stage 3- 2 cm x 1 cm x 0.1 cm    Wound Cleansing: VASHE X 5 MINUTE SOAK AT WOUND CARE CENTER ONLY    Primary Dressing:  [x] TRIAD PASTE TO DELMY WOUND  []     Secondary Dressing:  [x]ABD PAD   [x] BRIEF      Dressing Frequency:  [x] TWICE A DAY FOR TRIAD PASTE    Compression and Edema Control:  [] Wear Home Compression Stockings   [] Spandagrip to:    Size: []Low compression 5-10 mm/Hg      []Medium compression 10-20 mm/Hg           []High compression  20-30 mm/Hg  [x] Ace Wrap Toes to Knee to  RIGHT AND LEFT LEG - REAPPLY DAILY PER NURSING STAFF  [] Multilayer Compression Wrap:  to    Do not get leg(s) with wrap wet.  If wraps become too tight call the center or completely remove the wrap.       Pressure Relief and Off Loading: LOW AIR LOSS MATTRESS.  HAS WHEELCHAIR CUSHION.  [x] Off-loading when [] walking  [x] in bed [x] sitting   Turn every 2 hours when in bed   Avoid putting direct pressure on the site of the wound. Limit side lying to 30 degree tilt. Limit elevating the head of the bed greater than 30 degrees.  LIMIT TIME IN CHAIR TO 4 HOURS TWICE PER DAY ( MORNING AND EVENING )                                      [x] Assistive Devices   MOTORIZED WHEELCHAIR  Use as instructed by the provider      Activity: Activity as Tolerated      Dietary:   Continue your diet as tolerated.  Protein is a key nutrient in helping to repair damaged tissue and promote new tissue growth. Good sources of protein include milk, yogurt, cheese, fish, lean meat and beans.  If you are DIABETIC, having diabetes can make it

## 2025-02-17 ENCOUNTER — HOSPITAL ENCOUNTER (OUTPATIENT)
Dept: WOUND CARE | Age: 60
Discharge: HOME | End: 2025-02-17
Payer: MEDICAID

## 2025-02-17 ENCOUNTER — TELEPHONE (OUTPATIENT)
Dept: WOUND CARE | Age: 60
End: 2025-02-17

## 2025-02-17 VITALS
TEMPERATURE: 97.1 F | SYSTOLIC BLOOD PRESSURE: 148 MMHG | DIASTOLIC BLOOD PRESSURE: 92 MMHG | RESPIRATION RATE: 18 BRPM | HEART RATE: 68 BPM

## 2025-02-17 DIAGNOSIS — L89.314 PRESSURE ULCER OF RIGHT BUTTOCK, STAGE 4 (HCC): Primary | ICD-10-CM

## 2025-02-17 DIAGNOSIS — L89.314 PRESSURE ULCER OF RIGHT BUTTOCK, STAGE 4 (HCC): ICD-10-CM

## 2025-02-17 DIAGNOSIS — L89.324 PRESSURE ULCER OF LEFT BUTTOCK, STAGE 4 (HCC): ICD-10-CM

## 2025-02-17 DIAGNOSIS — L89.324 PRESSURE ULCER OF LEFT BUTTOCK, STAGE 4 (HCC): Primary | ICD-10-CM

## 2025-02-17 PROCEDURE — 11043 DBRDMT MUSC&/FSCA 1ST 20/<: CPT

## 2025-02-17 PROCEDURE — 11046 DBRDMT MUSC&/FSCA EA ADDL: CPT

## 2025-02-17 RX ORDER — LIDOCAINE HYDROCHLORIDE 40 MG/ML
SOLUTION TOPICAL ONCE
OUTPATIENT
Start: 2025-02-17 | End: 2025-02-17

## 2025-02-17 RX ORDER — BETAMETHASONE DIPROPIONATE 0.5 MG/G
CREAM TOPICAL ONCE
OUTPATIENT
Start: 2025-02-17 | End: 2025-02-17

## 2025-02-17 RX ORDER — TRIAMCINOLONE ACETONIDE 1 MG/G
OINTMENT TOPICAL ONCE
OUTPATIENT
Start: 2025-02-17 | End: 2025-02-17

## 2025-02-17 RX ORDER — SILVER SULFADIAZINE 10 MG/G
CREAM TOPICAL ONCE
OUTPATIENT
Start: 2025-02-17 | End: 2025-02-17

## 2025-02-17 RX ORDER — GINSENG 100 MG
CAPSULE ORAL ONCE
OUTPATIENT
Start: 2025-02-17 | End: 2025-02-17

## 2025-02-17 RX ORDER — LIDOCAINE 40 MG/G
CREAM TOPICAL ONCE
OUTPATIENT
Start: 2025-02-17 | End: 2025-02-17

## 2025-02-17 RX ORDER — MUPIROCIN 20 MG/G
OINTMENT TOPICAL ONCE
OUTPATIENT
Start: 2025-02-17 | End: 2025-02-17

## 2025-02-17 RX ORDER — LIDOCAINE HYDROCHLORIDE 20 MG/ML
JELLY TOPICAL ONCE
OUTPATIENT
Start: 2025-02-17 | End: 2025-02-17

## 2025-02-17 RX ORDER — SODIUM CHLOR/HYPOCHLOROUS ACID 0.033 %
SOLUTION, IRRIGATION IRRIGATION ONCE
OUTPATIENT
Start: 2025-02-17 | End: 2025-02-17

## 2025-02-17 RX ORDER — GENTAMICIN SULFATE 1 MG/G
OINTMENT TOPICAL ONCE
OUTPATIENT
Start: 2025-02-17 | End: 2025-02-17

## 2025-02-17 RX ORDER — LIDOCAINE 50 MG/G
OINTMENT TOPICAL ONCE
OUTPATIENT
Start: 2025-02-17 | End: 2025-02-17

## 2025-02-17 RX ORDER — NEOMYCIN/BACITRACIN/POLYMYXINB 3.5-400-5K
OINTMENT (GRAM) TOPICAL ONCE
OUTPATIENT
Start: 2025-02-17 | End: 2025-02-17

## 2025-02-17 RX ORDER — CLOBETASOL PROPIONATE 0.5 MG/G
OINTMENT TOPICAL ONCE
OUTPATIENT
Start: 2025-02-17 | End: 2025-02-17

## 2025-02-17 RX ORDER — BACITRACIN ZINC AND POLYMYXIN B SULFATE 500; 1000 [USP'U]/G; [USP'U]/G
OINTMENT TOPICAL ONCE
OUTPATIENT
Start: 2025-02-17 | End: 2025-02-17

## 2025-02-17 RX ORDER — LIDOCAINE HYDROCHLORIDE 40 MG/ML
SOLUTION TOPICAL ONCE
Status: COMPLETED | OUTPATIENT
Start: 2025-02-17 | End: 2025-02-17

## 2025-02-17 RX ADMIN — LIDOCAINE HYDROCHLORIDE: 40 SOLUTION TOPICAL at 14:16

## 2025-02-17 ASSESSMENT — PAIN - FUNCTIONAL ASSESSMENT: PAIN_FUNCTIONAL_ASSESSMENT: PREVENTS OR INTERFERES SOME ACTIVE ACTIVITIES AND ADLS

## 2025-02-17 ASSESSMENT — PAIN DESCRIPTION - ORIENTATION: ORIENTATION: RIGHT;LEFT

## 2025-02-17 ASSESSMENT — PAIN DESCRIPTION - PAIN TYPE: TYPE: CHRONIC PAIN

## 2025-02-17 ASSESSMENT — PAIN DESCRIPTION - LOCATION: LOCATION: BUTTOCKS;SCROTUM

## 2025-02-17 ASSESSMENT — PAIN DESCRIPTION - ONSET: ONSET: ON-GOING

## 2025-02-17 ASSESSMENT — PAIN SCALES - GENERAL: PAINLEVEL_OUTOF10: 5

## 2025-02-17 ASSESSMENT — PAIN DESCRIPTION - FREQUENCY: FREQUENCY: CONTINUOUS

## 2025-02-17 ASSESSMENT — PAIN DESCRIPTION - DESCRIPTORS: DESCRIPTORS: BURNING

## 2025-02-17 NOTE — TELEPHONE ENCOUNTER
Patient came in with no dressings on his wounds today. Obinna Argueta CNP, had patient follow up in 1 week to determine whether or not the new treatment is working.     Elsi, the director of nursing, was contacted in regards to dressing changes not being done. Elsi expressed that the patient has refused routine dressing changes x 3. MD at the facility has been notified via nursing home staff of the refusals.    [facility] was not aware of the continuous refusals of care and Elsi will give updates on a weekly basis of how much chair time and amount of refusals.    Patient is aware of the conversation and will try and be more compliant.

## 2025-02-17 NOTE — PATIENT INSTRUCTIONS
[facility] Wound Care Physician Orders and Discharge Instructions  [facility]  [address]  [place], [place] [address]  Telephone: [phone]      FAX [phone]  MONDAY - THURSDAY 8:00 am - 4:30 pm and Friday 8:00 am - 12:00 pm.        NAME:  César Saunders  YOB: 1965  MEDICAL RECORD NUMBER:  [id number]  DATE:  2/17/2025      Return Appointment:  [x] Return Appointment: With Jose Angel Argueta CNP  in  1 Week(s) PER PROVIDER   [] Wound and dressing supply provider:   [x] ECF or Home Healthcare: [facility]   [] Wound Assessment: [] Physician or NP scheduled for Wound Assessment:   [] Orders placed during your visit:      Important Reminders:   Please wash hands with soap and water before and after every dressing change.  Do not scrub wounds.  Keep wounds dry in shower unless otherwise instructed by the physician.  SMOKING can slow would healing. Stop smoking as soon as possible to improve healing and prevent further complications associated with smoking.      Delmy-Wound Topical Treatments:  Do not apply lotions, creams, or ointments to wound bed unless directed.   [] Apply moisturizing lotion to skin surrounding the wound prior to dressing change.  [] Apply antifungal ointment to skin surrounding the wound prior to dressing change.  [x] Apply thin film of no sting moisture barrier ointment to skin immediately around  wound. (Skin Prep)  [] Other:       Wound Location: RIGHT AND LEFT BUTTOCK WOUNDS    2/17/2024   Pressure Stage 4- Right Buttock 8.1 cm x 4.4 cm x 0.5 cm;   Pressure Stage 4- Left Buttock  4.5 cm x 4 cm x 0.4 cm;       Wound Cleansing: VASHE X 5 MINUTE SOAK AT WOUND CARE CENTER ONLY    Primary Dressing:  [x] TRIAD PASTE TO DELMY WOUND  [x] HYDROFERA BLUE TRANSFER TO OPEN WOUNDS    Secondary Dressing:  [x]ABD PAD   [x] Medipore Tape ONLY      Dressing Frequency:  [x] TWICE A DAY FOR TRIAD PASTE; EVERY OTHER DAY FOR HYDROFERA BLUE

## 2025-02-17 NOTE — PROGRESS NOTES
[facility]     Note Type: Medical Staff Progress Note    Referring Provider: Self Referral  Reason for Referral: Bilateral buttock wounds    César Saunders  MEDICAL RECORD NUMBER:  [id number]  AGE: 59 y.o.   GENDER: male  : 1965  EPISODE DATE:  2025    Chief complaint and reason for visit:     Chief Complaint   Patient presents with    Wound Check     Follow-up visit for wounds to the scrotum and buttocks.         HPI/Wound Narrative:      César Saunders is a 59 y.o. male who presents today for an evaluation of a wound/ulcer. Wound duration:  2-3 year(s).  Patient presents for returning wound care visit for bilateral chronic gluteal wounds.  Patient resides at [facility] on the medical unit for assistance with dressing changes.  Patient paraplegic and uses a motorized scooter with a high level seating cushion.  History of diabetes, hypertension, obesity, and chronic pressure injuries.  Current treatment: unknown.  He denies pain, fever, chills and/or nausea/vomiting    Wound/Ulcer Pain Timing/Severity: none  Quality of pain: N/A  Severity:  0 / 10   Modifying Factors: None  Associated Signs/Symptoms: none    2025: Patient states new treatment from last week was not performed by nursing facility.   will call nursing facility to discuss dressing change per provider orders.  Continue current treatment.  Follow-up in 1 week.    2/10/2025: No issues or concerns reported.  Treatment changed to Hydrofera Blue secured with dry dressing with Skin-Prep to periwound skin.  Follow-up in 1 week.  Patient would benefit from a colostomy to improve wound healing.  Will consider general surgery consult if wound remains slow to heal due to mixed incontinence.    2025:  No significant change in wound.  No overt signs of infection or constitutional issues.  Continue current plan of care with alginate with silver and Triad paste to

## 2025-02-24 ENCOUNTER — HOSPITAL ENCOUNTER (OUTPATIENT)
Dept: WOUND CARE | Age: 60
Discharge: HOME | End: 2025-02-24
Payer: MEDICAID

## 2025-02-24 VITALS
DIASTOLIC BLOOD PRESSURE: 79 MMHG | RESPIRATION RATE: 18 BRPM | HEART RATE: 69 BPM | SYSTOLIC BLOOD PRESSURE: 120 MMHG | TEMPERATURE: 97.7 F

## 2025-02-24 DIAGNOSIS — L89.314 PRESSURE ULCER OF RIGHT BUTTOCK, STAGE 4 (HCC): ICD-10-CM

## 2025-02-24 DIAGNOSIS — L89.324 PRESSURE ULCER OF LEFT BUTTOCK, STAGE 4 (HCC): Primary | ICD-10-CM

## 2025-02-24 PROCEDURE — 11043 DBRDMT MUSC&/FSCA 1ST 20/<: CPT

## 2025-02-24 PROCEDURE — 11046 DBRDMT MUSC&/FSCA EA ADDL: CPT

## 2025-02-24 RX ORDER — SODIUM CHLOR/HYPOCHLOROUS ACID 0.033 %
SOLUTION, IRRIGATION IRRIGATION ONCE
OUTPATIENT
Start: 2025-02-24 | End: 2025-02-24

## 2025-02-24 RX ORDER — LIDOCAINE HYDROCHLORIDE 20 MG/ML
JELLY TOPICAL ONCE
OUTPATIENT
Start: 2025-02-24 | End: 2025-02-24

## 2025-02-24 RX ORDER — BETAMETHASONE DIPROPIONATE 0.5 MG/G
CREAM TOPICAL ONCE
OUTPATIENT
Start: 2025-02-24 | End: 2025-02-24

## 2025-02-24 RX ORDER — LIDOCAINE HYDROCHLORIDE 40 MG/ML
SOLUTION TOPICAL ONCE
OUTPATIENT
Start: 2025-02-24 | End: 2025-02-24

## 2025-02-24 RX ORDER — MUPIROCIN 20 MG/G
OINTMENT TOPICAL ONCE
OUTPATIENT
Start: 2025-02-24 | End: 2025-02-24

## 2025-02-24 RX ORDER — TRIAMCINOLONE ACETONIDE 1 MG/G
OINTMENT TOPICAL ONCE
OUTPATIENT
Start: 2025-02-24 | End: 2025-02-24

## 2025-02-24 RX ORDER — NEOMYCIN/BACITRACIN/POLYMYXINB 3.5-400-5K
OINTMENT (GRAM) TOPICAL ONCE
OUTPATIENT
Start: 2025-02-24 | End: 2025-02-24

## 2025-02-24 RX ORDER — SILVER SULFADIAZINE 10 MG/G
CREAM TOPICAL ONCE
OUTPATIENT
Start: 2025-02-24 | End: 2025-02-24

## 2025-02-24 RX ORDER — LIDOCAINE HYDROCHLORIDE 40 MG/ML
SOLUTION TOPICAL ONCE
Status: COMPLETED | OUTPATIENT
Start: 2025-02-24 | End: 2025-02-24

## 2025-02-24 RX ORDER — GENTAMICIN SULFATE 1 MG/G
OINTMENT TOPICAL ONCE
OUTPATIENT
Start: 2025-02-24 | End: 2025-02-24

## 2025-02-24 RX ORDER — LIDOCAINE 40 MG/G
CREAM TOPICAL ONCE
OUTPATIENT
Start: 2025-02-24 | End: 2025-02-24

## 2025-02-24 RX ORDER — LIDOCAINE 50 MG/G
OINTMENT TOPICAL ONCE
OUTPATIENT
Start: 2025-02-24 | End: 2025-02-24

## 2025-02-24 RX ORDER — BACITRACIN ZINC AND POLYMYXIN B SULFATE 500; 1000 [USP'U]/G; [USP'U]/G
OINTMENT TOPICAL ONCE
OUTPATIENT
Start: 2025-02-24 | End: 2025-02-24

## 2025-02-24 RX ORDER — GINSENG 100 MG
CAPSULE ORAL ONCE
OUTPATIENT
Start: 2025-02-24 | End: 2025-02-24

## 2025-02-24 RX ORDER — CLOBETASOL PROPIONATE 0.5 MG/G
OINTMENT TOPICAL ONCE
OUTPATIENT
Start: 2025-02-24 | End: 2025-02-24

## 2025-02-24 RX ADMIN — LIDOCAINE HYDROCHLORIDE: 40 SOLUTION TOPICAL at 13:09

## 2025-02-24 ASSESSMENT — PAIN SCALES - GENERAL: PAINLEVEL_OUTOF10: 0

## 2025-02-24 NOTE — PROGRESS NOTES
[facility]     Note Type: Medical Staff Progress Note    Referring Provider: Self Referral  Reason for Referral: Bilateral buttock wounds    César Saunders  MEDICAL RECORD NUMBER:  [id number]  AGE: 59 y.o.   GENDER: male  : 1965  EPISODE DATE:  2025    Chief complaint and reason for visit:     Chief Complaint   Patient presents with    Wound Check     Follow up for buttocks.         HPI/Wound Narrative:      César Saunders is a 59 y.o. male who presents today for an evaluation of a wound/ulcer. Wound duration:  2-3 year(s).  Patient presents for returning wound care visit for bilateral chronic gluteal wounds.  Patient resides at [facility] on the medical unit for assistance with dressing changes.  Patient paraplegic and uses a motorized scooter with a high level seating cushion.  History of diabetes, hypertension, obesity, and chronic pressure injuries.  Current treatment: unknown.  He denies pain, fever, chills and/or nausea/vomiting    Wound/Ulcer Pain Timing/Severity: none  Quality of pain: N/A  Severity:  0 / 10   Modifying Factors: None  Associated Signs/Symptoms: none    2025: Patient endorses having dressing change routinely this past week.  No overt signs of infection or constitutional issues.  Wounds appearance improved.  Continue current treatment.  Follow-up in 1 week.    2025: Patient states new treatment from last week was not performed by nursing facility.   will call nursing facility to discuss dressing change per provider orders.  Continue current treatment.  Follow-up in 1 week.    2/10/2025: No issues or concerns reported.  Treatment changed to Hydrofera Blue secured with dry dressing with Skin-Prep to periwound skin.  Follow-up in 1 week.  Patient would benefit from a colostomy to improve wound healing.  Will consider general surgery consult if wound remains slow to heal due to mixed

## 2025-02-24 NOTE — PATIENT INSTRUCTIONS
[facility] Wound Care Physician Orders and Discharge Instructions  [facility]  [address]  [place], [place] [address]  Telephone: [phone]      FAX [phone]  MONDAY - THURSDAY 8:00 am - 4:30 pm and Friday 8:00 am - 12:00 pm.        NAME:  César Saunders  YOB: 1965  MEDICAL RECORD NUMBER:  [id number]  DATE:  2/24/2025      Return Appointment:  [x] Return Appointment: With Jose Angel Argueta CNP  in  1 Week(s) PER PROVIDER   [] Wound and dressing supply provider:   [x] ECF or Home Healthcare: [facility]   [] Wound Assessment: [] Physician or NP scheduled for Wound Assessment:   [] Orders placed during your visit:      Important Reminders:   Please wash hands with soap and water before and after every dressing change.  Do not scrub wounds.  Keep wounds dry in shower unless otherwise instructed by the physician.  SMOKING can slow would healing. Stop smoking as soon as possible to improve healing and prevent further complications associated with smoking.      Delmy-Wound Topical Treatments:  Do not apply lotions, creams, or ointments to wound bed unless directed.   [] Apply moisturizing lotion to skin surrounding the wound prior to dressing change.  [] Apply antifungal ointment to skin surrounding the wound prior to dressing change.  [x] Apply thin film of no sting moisture barrier ointment to skin immediately around  wound. (Skin Prep)  [] Other:       Wound Location: RIGHT AND LEFT BUTTOCK WOUNDS    2/24/2024   Pressure Stage 4- Right Buttock 8.1cm x 3cm x 0.3cm   Pressure Stage 4- Left Buttock  4cm x 5 cm x 0.3 cm       Wound Cleansing: VASHE X 5 MINUTE SOAK AT WOUND CARE CENTER ONLY    Primary Dressing:  [x] TRIAD PASTE TO DELMY WOUND  [x] HYDROFERA BLUE TRANSFER TO OPEN WOUNDS    Secondary Dressing:  [x]ABD PAD   [x] Medipore Tape ONLY      Dressing Frequency:  [x] TWICE A DAY FOR TRIAD PASTE; EVERY OTHER DAY FOR HYDROFERA BLUE

## 2025-03-03 ENCOUNTER — HOSPITAL ENCOUNTER (OUTPATIENT)
Dept: WOUND CARE | Age: 60
Discharge: HOME | End: 2025-03-03
Payer: MEDICAID

## 2025-03-03 VITALS
SYSTOLIC BLOOD PRESSURE: 129 MMHG | DIASTOLIC BLOOD PRESSURE: 68 MMHG | RESPIRATION RATE: 18 BRPM | TEMPERATURE: 97.5 F | HEART RATE: 70 BPM

## 2025-03-03 DIAGNOSIS — L89.324 PRESSURE ULCER OF LEFT BUTTOCK, STAGE 4 (HCC): Primary | ICD-10-CM

## 2025-03-03 DIAGNOSIS — L89.314 PRESSURE ULCER OF RIGHT BUTTOCK, STAGE 4 (HCC): ICD-10-CM

## 2025-03-03 PROCEDURE — 87186 SC STD MICRODIL/AGAR DIL: CPT

## 2025-03-03 PROCEDURE — 11043 DBRDMT MUSC&/FSCA 1ST 20/<: CPT

## 2025-03-03 PROCEDURE — 11046 DBRDMT MUSC&/FSCA EA ADDL: CPT

## 2025-03-03 PROCEDURE — 87070 CULTURE OTHR SPECIMN AEROBIC: CPT

## 2025-03-03 PROCEDURE — 87077 CULTURE AEROBIC IDENTIFY: CPT

## 2025-03-03 PROCEDURE — 87205 SMEAR GRAM STAIN: CPT

## 2025-03-03 PROCEDURE — 87075 CULTR BACTERIA EXCEPT BLOOD: CPT

## 2025-03-03 PROCEDURE — 87184 SC STD DISK METHOD PER PLATE: CPT

## 2025-03-03 RX ORDER — CLOBETASOL PROPIONATE 0.5 MG/G
OINTMENT TOPICAL ONCE
OUTPATIENT
Start: 2025-03-03 | End: 2025-03-03

## 2025-03-03 RX ORDER — GINSENG 100 MG
CAPSULE ORAL ONCE
OUTPATIENT
Start: 2025-03-03 | End: 2025-03-03

## 2025-03-03 RX ORDER — SILVER SULFADIAZINE 10 MG/G
CREAM TOPICAL ONCE
OUTPATIENT
Start: 2025-03-03 | End: 2025-03-03

## 2025-03-03 RX ORDER — MUPIROCIN 20 MG/G
OINTMENT TOPICAL ONCE
OUTPATIENT
Start: 2025-03-03 | End: 2025-03-03

## 2025-03-03 RX ORDER — TRIAMCINOLONE ACETONIDE 1 MG/G
OINTMENT TOPICAL ONCE
OUTPATIENT
Start: 2025-03-03 | End: 2025-03-03

## 2025-03-03 RX ORDER — BACITRACIN ZINC AND POLYMYXIN B SULFATE 500; 1000 [USP'U]/G; [USP'U]/G
OINTMENT TOPICAL ONCE
OUTPATIENT
Start: 2025-03-03 | End: 2025-03-03

## 2025-03-03 RX ORDER — GENTAMICIN SULFATE 1 MG/G
OINTMENT TOPICAL ONCE
OUTPATIENT
Start: 2025-03-03 | End: 2025-03-03

## 2025-03-03 RX ORDER — LIDOCAINE 40 MG/G
CREAM TOPICAL ONCE
OUTPATIENT
Start: 2025-03-03 | End: 2025-03-03

## 2025-03-03 RX ORDER — LIDOCAINE HYDROCHLORIDE 40 MG/ML
SOLUTION TOPICAL ONCE
OUTPATIENT
Start: 2025-03-03 | End: 2025-03-03

## 2025-03-03 RX ORDER — LIDOCAINE 50 MG/G
OINTMENT TOPICAL ONCE
OUTPATIENT
Start: 2025-03-03 | End: 2025-03-03

## 2025-03-03 RX ORDER — NEOMYCIN/BACITRACIN/POLYMYXINB 3.5-400-5K
OINTMENT (GRAM) TOPICAL ONCE
OUTPATIENT
Start: 2025-03-03 | End: 2025-03-03

## 2025-03-03 RX ORDER — SODIUM CHLOR/HYPOCHLOROUS ACID 0.033 %
SOLUTION, IRRIGATION IRRIGATION ONCE
OUTPATIENT
Start: 2025-03-03 | End: 2025-03-03

## 2025-03-03 RX ORDER — BETAMETHASONE DIPROPIONATE 0.5 MG/G
CREAM TOPICAL ONCE
OUTPATIENT
Start: 2025-03-03 | End: 2025-03-03

## 2025-03-03 RX ORDER — LIDOCAINE HYDROCHLORIDE 20 MG/ML
JELLY TOPICAL ONCE
OUTPATIENT
Start: 2025-03-03 | End: 2025-03-03

## 2025-03-03 RX ORDER — LIDOCAINE HYDROCHLORIDE 40 MG/ML
SOLUTION TOPICAL ONCE
Status: COMPLETED | OUTPATIENT
Start: 2025-03-03 | End: 2025-03-03

## 2025-03-03 RX ADMIN — LIDOCAINE HYDROCHLORIDE: 40 SOLUTION TOPICAL at 13:28

## 2025-03-03 ASSESSMENT — PAIN DESCRIPTION - FREQUENCY: FREQUENCY: CONTINUOUS

## 2025-03-03 ASSESSMENT — PAIN SCALES - GENERAL
PAINLEVEL_OUTOF10: 10
PAINLEVEL_OUTOF10: 0

## 2025-03-03 ASSESSMENT — PAIN DESCRIPTION - PAIN TYPE: TYPE: CHRONIC PAIN

## 2025-03-03 ASSESSMENT — PAIN DESCRIPTION - DESCRIPTORS: DESCRIPTORS: BURNING

## 2025-03-03 ASSESSMENT — PAIN DESCRIPTION - ONSET: ONSET: ON-GOING

## 2025-03-03 ASSESSMENT — PAIN DESCRIPTION - LOCATION: LOCATION: BUTTOCKS

## 2025-03-03 ASSESSMENT — PAIN - FUNCTIONAL ASSESSMENT: PAIN_FUNCTIONAL_ASSESSMENT: PREVENTS OR INTERFERES SOME ACTIVE ACTIVITIES AND ADLS

## 2025-03-03 ASSESSMENT — PAIN DESCRIPTION - ORIENTATION: ORIENTATION: RIGHT;LEFT

## 2025-03-03 NOTE — PATIENT INSTRUCTIONS
Should you experience any significant changes in your wound(s) or have questions about your wound care, please contact the [facility] at [phone] MONDAY - THURSDAY 8:00 am - 4:30 pm and Friday 8:00 am - 12:30 pm.  If you need help with your wound outside these hours and cannot wait until we are again available, contact your PCP or go to the hospital emergency room.     PLEASE NOTE: IF YOU ARE UNABLE TO OBTAIN WOUND SUPPLIES, CONTINUE TO USE THE SUPPLIES YOU HAVE AVAILABLE UNTIL YOU ARE ABLE TO REACH US. IT IS MOST IMPORTANT TO KEEP THE WOUND COVERED AT ALL TIMES.         Physician Signature:_______________________    Date: ___________ Time:  ____________          Jose Angel Argueta CNP

## 2025-03-04 NOTE — PROGRESS NOTES
care, please contact the [facility] at [phone] MONDAY - THURSDAY 8:00 am - 4:30 pm and Friday 8:00 am - 12:30 pm.  If you need help with your wound outside these hours and cannot wait until we are again available, contact your PCP or go to the hospital emergency room.     PLEASE NOTE: IF YOU ARE UNABLE TO OBTAIN WOUND SUPPLIES, CONTINUE TO USE THE SUPPLIES YOU HAVE AVAILABLE UNTIL YOU ARE ABLE TO REACH US. IT IS MOST IMPORTANT TO KEEP THE WOUND COVERED AT ALL TIMES.         Physician Signature:_______________________    Date: ___________ Time:  ____________          Jose Angel Argueta CNP      Electronically signed by TRAVIS Bruno CNP on 3/4/2025 at 11:07 AM

## 2025-03-07 RX ORDER — SULFAMETHOXAZOLE AND TRIMETHOPRIM 400; 80 MG/1; MG/1
1 TABLET ORAL 2 TIMES DAILY
Qty: 20 TABLET | Refills: 0 | Status: SHIPPED | OUTPATIENT
Start: 2025-03-07 | End: 2025-03-17

## 2025-03-09 LAB
BACTERIA SPEC AEROBE CULT: ABNORMAL
BACTERIA SPEC ANAEROBE CULT: ABNORMAL
GRAM STN SPEC: ABNORMAL
ORGANISM: ABNORMAL

## 2025-03-10 ENCOUNTER — HOSPITAL ENCOUNTER (OUTPATIENT)
Dept: WOUND CARE | Age: 60
Discharge: HOME | End: 2025-03-10
Payer: MEDICAID

## 2025-03-10 VITALS
DIASTOLIC BLOOD PRESSURE: 66 MMHG | HEART RATE: 64 BPM | RESPIRATION RATE: 18 BRPM | SYSTOLIC BLOOD PRESSURE: 133 MMHG | TEMPERATURE: 97.2 F

## 2025-03-10 DIAGNOSIS — L89.314 PRESSURE ULCER OF RIGHT BUTTOCK, STAGE 4 (HCC): ICD-10-CM

## 2025-03-10 DIAGNOSIS — L03.317 CELLULITIS OF BUTTOCK: ICD-10-CM

## 2025-03-10 DIAGNOSIS — T14.8XXA WOUND INFECTION: ICD-10-CM

## 2025-03-10 DIAGNOSIS — L08.9 WOUND INFECTION: ICD-10-CM

## 2025-03-10 DIAGNOSIS — G82.20 PARAPLEGIA (HCC): ICD-10-CM

## 2025-03-10 DIAGNOSIS — L89.324 PRESSURE ULCER OF LEFT BUTTOCK, STAGE 4 (HCC): Primary | ICD-10-CM

## 2025-03-10 PROBLEM — L02.415 CELLULITIS AND ABSCESS OF RIGHT LOWER EXTREMITY: Status: RESOLVED | Noted: 2020-03-27 | Resolved: 2025-03-10

## 2025-03-10 PROBLEM — L03.115 CELLULITIS AND ABSCESS OF RIGHT LOWER EXTREMITY: Status: RESOLVED | Noted: 2020-03-27 | Resolved: 2025-03-10

## 2025-03-10 PROBLEM — S31.000D SACRAL WOUND, SUBSEQUENT ENCOUNTER: Status: RESOLVED | Noted: 2020-03-27 | Resolved: 2025-03-10

## 2025-03-10 PROBLEM — L89.153 SACRAL DECUBITUS ULCER, STAGE III (HCC): Status: RESOLVED | Noted: 2024-07-29 | Resolved: 2025-03-10

## 2025-03-10 PROCEDURE — 11043 DBRDMT MUSC&/FSCA 1ST 20/<: CPT

## 2025-03-10 PROCEDURE — 11046 DBRDMT MUSC&/FSCA EA ADDL: CPT

## 2025-03-10 RX ORDER — LIDOCAINE HYDROCHLORIDE 20 MG/ML
JELLY TOPICAL ONCE
OUTPATIENT
Start: 2025-03-10 | End: 2025-03-10

## 2025-03-10 RX ORDER — LIDOCAINE HYDROCHLORIDE 40 MG/ML
SOLUTION TOPICAL ONCE
OUTPATIENT
Start: 2025-03-10 | End: 2025-03-10

## 2025-03-10 RX ORDER — CLOBETASOL PROPIONATE 0.5 MG/G
OINTMENT TOPICAL ONCE
OUTPATIENT
Start: 2025-03-10 | End: 2025-03-10

## 2025-03-10 RX ORDER — SODIUM CHLOR/HYPOCHLOROUS ACID 0.033 %
SOLUTION, IRRIGATION IRRIGATION ONCE
OUTPATIENT
Start: 2025-03-10 | End: 2025-03-10

## 2025-03-10 RX ORDER — GINSENG 100 MG
CAPSULE ORAL ONCE
OUTPATIENT
Start: 2025-03-10 | End: 2025-03-10

## 2025-03-10 RX ORDER — BETAMETHASONE DIPROPIONATE 0.5 MG/G
CREAM TOPICAL ONCE
OUTPATIENT
Start: 2025-03-10 | End: 2025-03-10

## 2025-03-10 RX ORDER — TRIAMCINOLONE ACETONIDE 1 MG/G
OINTMENT TOPICAL ONCE
OUTPATIENT
Start: 2025-03-10 | End: 2025-03-10

## 2025-03-10 RX ORDER — NEOMYCIN/BACITRACIN/POLYMYXINB 3.5-400-5K
OINTMENT (GRAM) TOPICAL ONCE
OUTPATIENT
Start: 2025-03-10 | End: 2025-03-10

## 2025-03-10 RX ORDER — LIDOCAINE 50 MG/G
OINTMENT TOPICAL ONCE
OUTPATIENT
Start: 2025-03-10 | End: 2025-03-10

## 2025-03-10 RX ORDER — SILVER SULFADIAZINE 10 MG/G
CREAM TOPICAL ONCE
OUTPATIENT
Start: 2025-03-10 | End: 2025-03-10

## 2025-03-10 RX ORDER — LIDOCAINE 40 MG/G
CREAM TOPICAL ONCE
OUTPATIENT
Start: 2025-03-10 | End: 2025-03-10

## 2025-03-10 RX ORDER — BACITRACIN ZINC AND POLYMYXIN B SULFATE 500; 1000 [USP'U]/G; [USP'U]/G
OINTMENT TOPICAL ONCE
OUTPATIENT
Start: 2025-03-10 | End: 2025-03-10

## 2025-03-10 RX ORDER — MUPIROCIN 20 MG/G
OINTMENT TOPICAL ONCE
OUTPATIENT
Start: 2025-03-10 | End: 2025-03-10

## 2025-03-10 RX ORDER — LIDOCAINE HYDROCHLORIDE 40 MG/ML
SOLUTION TOPICAL ONCE
Status: COMPLETED | OUTPATIENT
Start: 2025-03-10 | End: 2025-03-10

## 2025-03-10 RX ORDER — GENTAMICIN SULFATE 1 MG/G
OINTMENT TOPICAL ONCE
OUTPATIENT
Start: 2025-03-10 | End: 2025-03-10

## 2025-03-10 RX ADMIN — LIDOCAINE HYDROCHLORIDE: 40 SOLUTION TOPICAL at 13:31

## 2025-03-10 ASSESSMENT — PAIN DESCRIPTION - DESCRIPTORS: DESCRIPTORS: BURNING

## 2025-03-10 ASSESSMENT — PAIN DESCRIPTION - ORIENTATION: ORIENTATION: RIGHT;LEFT

## 2025-03-10 ASSESSMENT — PAIN DESCRIPTION - ONSET: ONSET: ON-GOING

## 2025-03-10 ASSESSMENT — PAIN DESCRIPTION - LOCATION: LOCATION: BUTTOCKS

## 2025-03-10 ASSESSMENT — PAIN - FUNCTIONAL ASSESSMENT: PAIN_FUNCTIONAL_ASSESSMENT: PREVENTS OR INTERFERES SOME ACTIVE ACTIVITIES AND ADLS

## 2025-03-10 ASSESSMENT — PAIN DESCRIPTION - PAIN TYPE: TYPE: CHRONIC PAIN

## 2025-03-10 ASSESSMENT — PAIN SCALES - GENERAL: PAINLEVEL_OUTOF10: 5

## 2025-03-10 ASSESSMENT — PAIN DESCRIPTION - FREQUENCY: FREQUENCY: CONTINUOUS

## 2025-03-10 NOTE — PROGRESS NOTES
doctor.  For Persistent Pain not relieved by the above interventions, please notify your family doctor.        : ANGÉLICA    Electronically signed by Angélica Brennan RN on 3/10/2025 at 1:41 PM       Wound Care Center Information: Should you experience any significant changes in your wound(s) or have questions about your wound care, please contact the [facility] at [phone] MONDAY - THURSDAY 8:00 am - 4:30 pm and Friday 8:00 am - 12:30 pm.  If you need help with your wound outside these hours and cannot wait until we are again available, contact your PCP or go to the hospital emergency room.     PLEASE NOTE: IF YOU ARE UNABLE TO OBTAIN WOUND SUPPLIES, CONTINUE TO USE THE SUPPLIES YOU HAVE AVAILABLE UNTIL YOU ARE ABLE TO REACH US. IT IS MOST IMPORTANT TO KEEP THE WOUND COVERED AT ALL TIMES.         Physician Signature:_______________________    Date: ___________ Time:  ____________          Jose Angel Argueta CNP      Electronically signed by TRAVIS Bruno CNP on 3/10/2025 at 2:12 PM

## 2025-03-10 NOTE — PATIENT INSTRUCTIONS
[facility] Wound Care Physician Orders and Discharge Instructions  [facility]  [address], [address]  [place], [place] [address]  Telephone: [phone]      FAX [phone]  MONDAY - THURSDAY 8:00 am - 4:30 pm and Friday 8:00 am - 12:00 pm.        NAME:  César Saunders  YOB: 1965  MEDICAL RECORD NUMBER:  [id number]  DATE:  3/10/2025      Return Appointment:  [x] Return Appointment: With Jose Angel Argueta CNP  in  1 Week(s)   [] Wound and dressing supply provider:   [x] ECF or Home Healthcare: [facility]   [] Wound Assessment: [] Physician or NP scheduled for Wound Assessment:   [x] Orders placed during your visit: Start Bactrim DS 2x/Daily for 10 days      Important Reminders:   Please wash hands with soap and water before and after every dressing change.  Do not scrub wounds.  Keep wounds dry in shower unless otherwise instructed by the physician.  SMOKING can slow would healing. Stop smoking as soon as possible to improve healing and prevent further complications associated with smoking.      Angelica-Wound Topical Treatments:  Do not apply lotions, creams, or ointments to wound bed unless directed.   [] Apply moisturizing lotion to skin surrounding the wound prior to dressing change.  [] Apply antifungal ointment to skin surrounding the wound prior to dressing change.  [x] Apply thin film of no sting moisture barrier ointment to skin immediately around  wound. (Skin Prep)  [] Other:     **TRIAD TO SCROTUM ONLY - APPLY DAILY**      Wound Location: RIGHT AND LEFT BUTTOCK WOUNDS    3/10/2024   Pressure Stage 4- Right Buttock 8.5 cm x 3 cm x 0.9 cm   Pressure Stage 4- Left Buttock  3.6 cm x 5 cm x 0.3 cm       Wound Cleansing: Dakin's Wash X 5 MINUTE SOAK AT WOUND CARE CENTER ONLY    Primary Dressing:  []   [x] HYDROFERA BLUE TRANSFER TO OPEN WOUNDS    Secondary Dressing:  [x]DUODERM OVER WOUNDS  [x] COVER ROLL TAPE TO EDGES OF DUODERM      Dressing Frequency:  [x]

## 2025-03-17 ENCOUNTER — HOSPITAL ENCOUNTER (OUTPATIENT)
Dept: WOUND CARE | Age: 60
Discharge: HOME | End: 2025-03-17
Payer: MEDICAID

## 2025-03-17 VITALS
TEMPERATURE: 97.3 F | RESPIRATION RATE: 18 BRPM | SYSTOLIC BLOOD PRESSURE: 117 MMHG | DIASTOLIC BLOOD PRESSURE: 63 MMHG | HEART RATE: 67 BPM

## 2025-03-17 DIAGNOSIS — L89.324 PRESSURE ULCER OF LEFT BUTTOCK, STAGE 4 (HCC): Primary | ICD-10-CM

## 2025-03-17 DIAGNOSIS — L89.314 PRESSURE ULCER OF RIGHT BUTTOCK, STAGE 4 (HCC): ICD-10-CM

## 2025-03-17 PROCEDURE — 11046 DBRDMT MUSC&/FSCA EA ADDL: CPT

## 2025-03-17 PROCEDURE — 11046 DBRDMT MUSC&/FSCA EA ADDL: CPT | Performed by: NURSE PRACTITIONER

## 2025-03-17 PROCEDURE — 11043 DBRDMT MUSC&/FSCA 1ST 20/<: CPT | Performed by: NURSE PRACTITIONER

## 2025-03-17 PROCEDURE — 11043 DBRDMT MUSC&/FSCA 1ST 20/<: CPT

## 2025-03-17 RX ORDER — NEOMYCIN/BACITRACIN/POLYMYXINB 3.5-400-5K
OINTMENT (GRAM) TOPICAL ONCE
OUTPATIENT
Start: 2025-03-17 | End: 2025-03-17

## 2025-03-17 RX ORDER — CLOBETASOL PROPIONATE 0.5 MG/G
OINTMENT TOPICAL ONCE
OUTPATIENT
Start: 2025-03-17 | End: 2025-03-17

## 2025-03-17 RX ORDER — GINSENG 100 MG
CAPSULE ORAL ONCE
OUTPATIENT
Start: 2025-03-17 | End: 2025-03-17

## 2025-03-17 RX ORDER — LIDOCAINE HYDROCHLORIDE 40 MG/ML
SOLUTION TOPICAL ONCE
OUTPATIENT
Start: 2025-03-17 | End: 2025-03-17

## 2025-03-17 RX ORDER — BETAMETHASONE DIPROPIONATE 0.5 MG/G
CREAM TOPICAL ONCE
OUTPATIENT
Start: 2025-03-17 | End: 2025-03-17

## 2025-03-17 RX ORDER — GENTAMICIN SULFATE 1 MG/G
OINTMENT TOPICAL ONCE
OUTPATIENT
Start: 2025-03-17 | End: 2025-03-17

## 2025-03-17 RX ORDER — TRIAMCINOLONE ACETONIDE 1 MG/G
OINTMENT TOPICAL ONCE
OUTPATIENT
Start: 2025-03-17 | End: 2025-03-17

## 2025-03-17 RX ORDER — LIDOCAINE 40 MG/G
CREAM TOPICAL ONCE
OUTPATIENT
Start: 2025-03-17 | End: 2025-03-17

## 2025-03-17 RX ORDER — BACITRACIN ZINC AND POLYMYXIN B SULFATE 500; 1000 [USP'U]/G; [USP'U]/G
OINTMENT TOPICAL ONCE
OUTPATIENT
Start: 2025-03-17 | End: 2025-03-17

## 2025-03-17 RX ORDER — LIDOCAINE HYDROCHLORIDE 20 MG/ML
JELLY TOPICAL ONCE
OUTPATIENT
Start: 2025-03-17 | End: 2025-03-17

## 2025-03-17 RX ORDER — SILVER SULFADIAZINE 10 MG/G
CREAM TOPICAL ONCE
OUTPATIENT
Start: 2025-03-17 | End: 2025-03-17

## 2025-03-17 RX ORDER — LIDOCAINE HYDROCHLORIDE 40 MG/ML
SOLUTION TOPICAL ONCE
Status: COMPLETED | OUTPATIENT
Start: 2025-03-17 | End: 2025-03-17

## 2025-03-17 RX ORDER — LIDOCAINE 50 MG/G
OINTMENT TOPICAL ONCE
OUTPATIENT
Start: 2025-03-17 | End: 2025-03-17

## 2025-03-17 RX ORDER — SODIUM CHLOR/HYPOCHLOROUS ACID 0.033 %
SOLUTION, IRRIGATION IRRIGATION ONCE
OUTPATIENT
Start: 2025-03-17 | End: 2025-03-17

## 2025-03-17 RX ORDER — MUPIROCIN 20 MG/G
OINTMENT TOPICAL ONCE
OUTPATIENT
Start: 2025-03-17 | End: 2025-03-17

## 2025-03-17 RX ADMIN — LIDOCAINE HYDROCHLORIDE: 40 SOLUTION TOPICAL at 13:11

## 2025-03-17 ASSESSMENT — PAIN SCALES - GENERAL: PAINLEVEL_OUTOF10: 0

## 2025-03-17 NOTE — PATIENT INSTRUCTIONS
[facility] Wound Care Physician Orders and Discharge Instructions  [facility]  [address], [address]  [place], [place] [address]  Telephone: [phone]      FAX [phone]  MONDAY - THURSDAY 8:00 am - 4:30 pm and Friday 8:00 am - 12:00 pm.        NAME:  César Saunders  YOB: 1965  MEDICAL RECORD NUMBER:  [id number]  DATE:  3/17/2025      Return Appointment:  [x] Return Appointment: With Jose Angel Argueta CNP  in  1 Week(s)   [] Wound and dressing supply provider:   [x] ECF or Home Healthcare: [facility]   [] Wound Assessment: [] Physician or NP scheduled for Wound Assessment:   [] Orders placed during your visit:       Important Reminders:   Please wash hands with soap and water before and after every dressing change.  Do not scrub wounds.  Keep wounds dry in shower unless otherwise instructed by the physician.  SMOKING can slow would healing. Stop smoking as soon as possible to improve healing and prevent further complications associated with smoking.      Angelica-Wound Topical Treatments:  Do not apply lotions, creams, or ointments to wound bed unless directed.   [] Apply moisturizing lotion to skin surrounding the wound prior to dressing change.  [] Apply antifungal ointment to skin surrounding the wound prior to dressing change.  [x] Apply thin film of no sting moisture barrier ointment to skin immediately around  wound. (Skin Prep)  [] Other:     **TRIAD TO SCROTUM ONLY - APPLY DAILY**      Wound Location: RIGHT AND LEFT BUTTOCK WOUNDS    3/17/2024   Pressure Stage 4- Right Buttock 8.5 cm x 2.5 cm x 1 cm   Pressure Stage 4- Left Buttock  4 cm x 5.5 cm x 0.2 cm       Wound Cleansing: Vashe Wash X 5 MINUTE SOAK AT WOUND CARE CENTER ONLY    Primary Dressing:  []   [x] HYDROFERA BLUE TRANSFER TO OPEN WOUNDS    Secondary Dressing:  [x]DUODERM OVER WOUNDS  [x] COVER ROLL TAPE TO EDGES OF DUODERM      Dressing Frequency:  [x] DAILY FOR BOTH TRIAD AND HYDROFERA BLUE -
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
## 2025-03-24 ENCOUNTER — HOSPITAL ENCOUNTER (OUTPATIENT)
Dept: WOUND CARE | Age: 60
Discharge: HOME | End: 2025-03-24
Payer: MEDICAID

## 2025-03-24 VITALS
DIASTOLIC BLOOD PRESSURE: 58 MMHG | RESPIRATION RATE: 18 BRPM | SYSTOLIC BLOOD PRESSURE: 132 MMHG | HEART RATE: 62 BPM | TEMPERATURE: 97 F

## 2025-03-24 DIAGNOSIS — L89.314 PRESSURE ULCER OF RIGHT BUTTOCK, STAGE 4 (HCC): ICD-10-CM

## 2025-03-24 DIAGNOSIS — L89.324 PRESSURE ULCER OF LEFT BUTTOCK, STAGE 4 (HCC): Primary | ICD-10-CM

## 2025-03-24 PROCEDURE — 11042 DBRDMT SUBQ TIS 1ST 20SQCM/<: CPT

## 2025-03-24 PROCEDURE — 11045 DBRDMT SUBQ TISS EACH ADDL: CPT

## 2025-03-24 RX ORDER — LIDOCAINE HYDROCHLORIDE 20 MG/ML
JELLY TOPICAL ONCE
OUTPATIENT
Start: 2025-03-24 | End: 2025-03-24

## 2025-03-24 RX ORDER — LIDOCAINE HYDROCHLORIDE 40 MG/ML
SOLUTION TOPICAL ONCE
OUTPATIENT
Start: 2025-03-24 | End: 2025-03-24

## 2025-03-24 RX ORDER — MUPIROCIN 20 MG/G
OINTMENT TOPICAL ONCE
OUTPATIENT
Start: 2025-03-24 | End: 2025-03-24

## 2025-03-24 RX ORDER — LIDOCAINE 50 MG/G
OINTMENT TOPICAL ONCE
OUTPATIENT
Start: 2025-03-24 | End: 2025-03-24

## 2025-03-24 RX ORDER — SODIUM CHLOR/HYPOCHLOROUS ACID 0.033 %
SOLUTION, IRRIGATION IRRIGATION ONCE
OUTPATIENT
Start: 2025-03-24 | End: 2025-03-24

## 2025-03-24 RX ORDER — NEOMYCIN/BACITRACIN/POLYMYXINB 3.5-400-5K
OINTMENT (GRAM) TOPICAL ONCE
OUTPATIENT
Start: 2025-03-24 | End: 2025-03-24

## 2025-03-24 RX ORDER — LIDOCAINE 40 MG/G
CREAM TOPICAL ONCE
OUTPATIENT
Start: 2025-03-24 | End: 2025-03-24

## 2025-03-24 RX ORDER — GINSENG 100 MG
CAPSULE ORAL ONCE
OUTPATIENT
Start: 2025-03-24 | End: 2025-03-24

## 2025-03-24 RX ORDER — SILVER SULFADIAZINE 10 MG/G
CREAM TOPICAL ONCE
OUTPATIENT
Start: 2025-03-24 | End: 2025-03-24

## 2025-03-24 RX ORDER — BACITRACIN ZINC AND POLYMYXIN B SULFATE 500; 1000 [USP'U]/G; [USP'U]/G
OINTMENT TOPICAL ONCE
OUTPATIENT
Start: 2025-03-24 | End: 2025-03-24

## 2025-03-24 RX ORDER — BETAMETHASONE DIPROPIONATE 0.5 MG/G
CREAM TOPICAL ONCE
OUTPATIENT
Start: 2025-03-24 | End: 2025-03-24

## 2025-03-24 RX ORDER — CLOBETASOL PROPIONATE 0.5 MG/G
OINTMENT TOPICAL ONCE
OUTPATIENT
Start: 2025-03-24 | End: 2025-03-24

## 2025-03-24 RX ORDER — LIDOCAINE HYDROCHLORIDE 40 MG/ML
SOLUTION TOPICAL ONCE
Status: COMPLETED | OUTPATIENT
Start: 2025-03-24 | End: 2025-03-24

## 2025-03-24 RX ORDER — GENTAMICIN SULFATE 1 MG/G
OINTMENT TOPICAL ONCE
OUTPATIENT
Start: 2025-03-24 | End: 2025-03-24

## 2025-03-24 RX ORDER — TRIAMCINOLONE ACETONIDE 1 MG/G
OINTMENT TOPICAL ONCE
OUTPATIENT
Start: 2025-03-24 | End: 2025-03-24

## 2025-03-24 RX ADMIN — LIDOCAINE HYDROCHLORIDE: 40 SOLUTION TOPICAL at 13:14

## 2025-03-24 ASSESSMENT — PAIN SCALES - GENERAL: PAINLEVEL_OUTOF10: 0

## 2025-03-24 NOTE — PROGRESS NOTES
tablet by mouth daily      ezetimibe (ZETIA) 10 MG tablet Take 1 tablet by mouth daily      potassium chloride (KLOR-CON M) 20 MEQ extended release tablet Take 1 tablet by mouth daily      insulin glargine (LANTUS) 100 UNIT/ML injection vial Inject 45 Units into the skin nightly      furosemide (LASIX) 20 MG tablet Take 1.5 tablets by mouth 2 times daily      magnesium hydroxide (MILK OF MAGNESIA) 400 MG/5ML suspension Take 30 mLs by mouth daily as needed for Constipation      metFORMIN (GLUCOPHAGE) 1000 MG tablet Take 1 tablet by mouth 2 times daily (with meals)      simethicone (MYLICON) 80 MG chewable tablet Take 1 tablet by mouth every 4 hours as needed for Flatulence      spironolactone (ALDACTONE) 25 MG tablet Take 1 tablet by mouth daily      vitamin C (ASCORBIC ACID) 500 MG tablet Take 1 tablet by mouth daily      insulin aspart (NOVOLOG) 100 UNIT/ML injection vial Inject 12 Units into the skin 3 times daily (before meals)      vitamin D (ERGOCALCIFEROL) 1.25 MG (65783 UT) CAPS capsule Take 1 capsule by mouth every 30 days On the 18th      atorvastatin (LIPITOR) 80 MG tablet Take 1 tablet by mouth nightly      baclofen (LIORESAL) 10 MG tablet Take 1 tablet by mouth 2 times daily      gabapentin (NEURONTIN) 400 MG capsule Take 2 capsules by mouth 3 times daily.      loperamide (IMODIUM) 2 MG capsule Take 1 capsule by mouth 4 times daily as needed for Diarrhea      losartan (COZAAR) 25 MG tablet Take 1 tablet by mouth daily      oxyBUTYnin (DITROPAN-XL) 10 MG extended release tablet Take 1 tablet by mouth daily      oxyCODONE-acetaminophen (PERCOCET) 5-325 MG per tablet Take 1 tablet by mouth in the morning, at noon, and at bedtime.      traZODone (DESYREL) 50 MG tablet Take 1 tablet by mouth nightly       No current facility-administered medications on file prior to encounter.       REVIEW OF SYSTEMS  A comprehensive review of systems was negative except noted in HPI/wound narrative and/or updates

## 2025-03-24 NOTE — PATIENT INSTRUCTIONS
[facility] Wound Care Physician Orders and Discharge Instructions  [facility]  [address], [address]  [place], [place] [address]  Telephone: [phone]      FAX [phone]  MONDAY - THURSDAY 8:00 am - 4:30 pm and Friday 8:00 am - 12:00 pm.        NAME:  César Saunders  YOB: 1965  MEDICAL RECORD NUMBER:  [id number]  DATE:  3/24/2025      Return Appointment:  [x] Return Appointment: With Jose Angel Argueta CNP  in  1 Week(s)   [] Wound and dressing supply provider:   [x] ECF or Home Healthcare: [facility] [place]   [] Wound Assessment: [] Physician or NP scheduled for Wound Assessment:   [] Orders placed during your visit:       Important Reminders:   Please wash hands with soap and water before and after every dressing change.  Do not scrub wounds.  Keep wounds dry in shower unless otherwise instructed by the physician.  SMOKING can slow would healing. Stop smoking as soon as possible to improve healing and prevent further complications associated with smoking.      Angelica-Wound Topical Treatments:  Do not apply lotions, creams, or ointments to wound bed unless directed.   [] Apply moisturizing lotion to skin surrounding the wound prior to dressing change.  [] Apply antifungal ointment to skin surrounding the wound prior to dressing change.  [x] Apply thin film of no sting moisture barrier ointment to skin immediately around  wound. (Skin Prep)  [] Other:     **TRIAD TO SCROTUM ONLY - APPLY DAILY**      Wound Location: RIGHT AND LEFT BUTTOCK WOUNDS    3/24/2025  Pressure Stage 4- Right Buttock 8 cm x 1.8 cm x 0.5 cm   Pressure Stage 4- Left Buttock  4 cm x 3.3 cm x 0.4 cm       Wound Cleansing: Dakins Wash X 5 MINUTE SOAK AT WOUND CARE CENTER ONLY    Primary Dressing:  []   [x] HYDROFERA BLUE TRANSFER TO OPEN WOUNDS    Secondary Dressing:  [x]Gauze  [x] COVER ROLL TAPE TO EDGES OF DUODERM      Dressing Frequency:  [x] DAILY FOR BOTH TRIAD AND HYDROFERA BLUE - AND AS

## 2025-03-31 ENCOUNTER — HOSPITAL ENCOUNTER (OUTPATIENT)
Dept: WOUND CARE | Age: 60
Discharge: HOME | End: 2025-03-31
Payer: MEDICAID

## 2025-03-31 VITALS
HEART RATE: 72 BPM | RESPIRATION RATE: 18 BRPM | TEMPERATURE: 97.2 F | SYSTOLIC BLOOD PRESSURE: 113 MMHG | DIASTOLIC BLOOD PRESSURE: 72 MMHG

## 2025-03-31 DIAGNOSIS — L89.324 PRESSURE ULCER OF LEFT BUTTOCK, STAGE 4 (HCC): Primary | ICD-10-CM

## 2025-03-31 DIAGNOSIS — L89.314 PRESSURE ULCER OF RIGHT BUTTOCK, STAGE 4 (HCC): ICD-10-CM

## 2025-03-31 PROCEDURE — 11042 DBRDMT SUBQ TIS 1ST 20SQCM/<: CPT

## 2025-03-31 PROCEDURE — 11045 DBRDMT SUBQ TISS EACH ADDL: CPT

## 2025-03-31 RX ORDER — LIDOCAINE 50 MG/G
OINTMENT TOPICAL ONCE
OUTPATIENT
Start: 2025-03-31 | End: 2025-03-31

## 2025-03-31 RX ORDER — GENTAMICIN SULFATE 1 MG/G
OINTMENT TOPICAL ONCE
OUTPATIENT
Start: 2025-03-31 | End: 2025-03-31

## 2025-03-31 RX ORDER — SODIUM CHLOR/HYPOCHLOROUS ACID 0.033 %
SOLUTION, IRRIGATION IRRIGATION ONCE
OUTPATIENT
Start: 2025-03-31 | End: 2025-03-31

## 2025-03-31 RX ORDER — MUPIROCIN 20 MG/G
OINTMENT TOPICAL ONCE
OUTPATIENT
Start: 2025-03-31 | End: 2025-03-31

## 2025-03-31 RX ORDER — NEOMYCIN/BACITRACIN/POLYMYXINB 3.5-400-5K
OINTMENT (GRAM) TOPICAL ONCE
OUTPATIENT
Start: 2025-03-31 | End: 2025-03-31

## 2025-03-31 RX ORDER — LIDOCAINE HYDROCHLORIDE 20 MG/ML
JELLY TOPICAL ONCE
OUTPATIENT
Start: 2025-03-31 | End: 2025-03-31

## 2025-03-31 RX ORDER — GINSENG 100 MG
CAPSULE ORAL ONCE
OUTPATIENT
Start: 2025-03-31 | End: 2025-03-31

## 2025-03-31 RX ORDER — BACITRACIN ZINC AND POLYMYXIN B SULFATE 500; 1000 [USP'U]/G; [USP'U]/G
OINTMENT TOPICAL ONCE
OUTPATIENT
Start: 2025-03-31 | End: 2025-03-31

## 2025-03-31 RX ORDER — LIDOCAINE HYDROCHLORIDE 40 MG/ML
SOLUTION TOPICAL ONCE
OUTPATIENT
Start: 2025-03-31 | End: 2025-03-31

## 2025-03-31 RX ORDER — BETAMETHASONE DIPROPIONATE 0.5 MG/G
CREAM TOPICAL ONCE
OUTPATIENT
Start: 2025-03-31 | End: 2025-03-31

## 2025-03-31 RX ORDER — LIDOCAINE HYDROCHLORIDE 40 MG/ML
SOLUTION TOPICAL ONCE
Status: COMPLETED | OUTPATIENT
Start: 2025-03-31 | End: 2025-03-31

## 2025-03-31 RX ORDER — LIDOCAINE 40 MG/G
CREAM TOPICAL ONCE
OUTPATIENT
Start: 2025-03-31 | End: 2025-03-31

## 2025-03-31 RX ORDER — SILVER SULFADIAZINE 10 MG/G
CREAM TOPICAL ONCE
OUTPATIENT
Start: 2025-03-31 | End: 2025-03-31

## 2025-03-31 RX ORDER — CLOBETASOL PROPIONATE 0.5 MG/G
OINTMENT TOPICAL ONCE
OUTPATIENT
Start: 2025-03-31 | End: 2025-03-31

## 2025-03-31 RX ORDER — TRIAMCINOLONE ACETONIDE 1 MG/G
OINTMENT TOPICAL ONCE
OUTPATIENT
Start: 2025-03-31 | End: 2025-03-31

## 2025-03-31 RX ADMIN — LIDOCAINE HYDROCHLORIDE 10 ML: 40 SOLUTION TOPICAL at 13:24

## 2025-03-31 ASSESSMENT — PAIN SCALES - GENERAL: PAINLEVEL_OUTOF10: 0

## 2025-03-31 NOTE — PROGRESS NOTES
provider. TO HELP ALLEVIATE PAIN WE RECOMMEND THE FOLLOWING  Elevate the affected limb.  Use over the counter medications as permitted by your family doctor.  For Persistent Pain not relieved by the above interventions, please notify your family doctor.        : ANGÉLICA    Electronically signed by Angélica Brennan RN on 3/31/2025 at 1:44 PM       Wound Care Center Information: Should you experience any significant changes in your wound(s) or have questions about your wound care, please contact the [facility]ound Center at [phone] MONDAY - THURSDAY 8:00 am - 4:30 pm and Friday 8:00 am - 12:30 pm.  If you need help with your wound outside these hours and cannot wait until we are again available, contact your PCP or go to the hospital emergency room.     PLEASE NOTE: IF YOU ARE UNABLE TO OBTAIN WOUND SUPPLIES, CONTINUE TO USE THE SUPPLIES YOU HAVE AVAILABLE UNTIL YOU ARE ABLE TO REACH US. IT IS MOST IMPORTANT TO KEEP THE WOUND COVERED AT ALL TIMES.         Physician Signature:_______________________    Date: ___________ Time:  ____________          Jose Angel Argueta CNP      Electronically signed by TRAVIS Bruno CNP on 3/31/2025 at 1:52 PM

## 2025-03-31 NOTE — PATIENT INSTRUCTIONS
[facility] Wound Care Physician Orders and Discharge Instructions  [facility]  [address], [address]  [place], [place] [address]  Telephone: [phone]      FAX [phone]  MONDAY - THURSDAY 8:00 am - 4:30 pm and Friday 8:00 am - 12:00 pm.        NAME:  César Saunders  YOB: 1965  MEDICAL RECORD NUMBER:  [id number]  DATE:  3/31/2025      Return Appointment:  [x] Return Appointment: With Jose Angel Argueta CNP  in  1 Week(s)   [] Wound and dressing supply provider:   [x] ECF or Home Healthcare: [facility] [place]   [] Wound Assessment: [] Physician or NP scheduled for Wound Assessment:   [] Orders placed during your visit:       Important Reminders:   Please wash hands with soap and water before and after every dressing change.  Do not scrub wounds.  Keep wounds dry in shower unless otherwise instructed by the physician.  SMOKING can slow would healing. Stop smoking as soon as possible to improve healing and prevent further complications associated with smoking.      Angelica-Wound Topical Treatments:  Do not apply lotions, creams, or ointments to wound bed unless directed.   [] Apply moisturizing lotion to skin surrounding the wound prior to dressing change.  [] Apply antifungal ointment to skin surrounding the wound prior to dressing change.  [x] Apply thin film of no sting moisture barrier ointment to skin immediately around  wound. (Skin Prep)  [] Other:     **TRIAD TO SCROTUM ONLY - APPLY DAILY**      Wound Location: RIGHT AND LEFT BUTTOCK WOUNDS    3/31/2025  Pressure Stage 4- Right Buttock 7.6 cm x 0.9 cm x 0.1 cm   Pressure Stage 4- Left Buttock  3.7 cm x 3.4 cm x 0.2 cm       Wound Cleansing: Dakins Wash X 5 MINUTE SOAK AT WOUND CARE CENTER ONLY    Primary Dressing:  []   [x] HYDROFERA BLUE TRANSFER TO OPEN WOUNDS    Secondary Dressing:  [x]Brief  []       Dressing Frequency:  [x] DAILY FOR BOTH TRIAD AND HYDROFERA BLUE - AND AS NEEDED      Compression and Edema
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
## 2025-04-02 ENCOUNTER — OFFICE VISIT (OUTPATIENT)
Dept: CARDIOLOGY CLINIC | Age: 60
End: 2025-04-02
Payer: MEDICAID

## 2025-04-02 VITALS
DIASTOLIC BLOOD PRESSURE: 58 MMHG | HEART RATE: 38 BPM | WEIGHT: 228 LBS | BODY MASS INDEX: 35.79 KG/M2 | HEIGHT: 67 IN | SYSTOLIC BLOOD PRESSURE: 108 MMHG | OXYGEN SATURATION: 94 %

## 2025-04-02 DIAGNOSIS — I49.3 PVC (PREMATURE VENTRICULAR CONTRACTION): Primary | ICD-10-CM

## 2025-04-02 PROCEDURE — 99214 OFFICE O/P EST MOD 30 MIN: CPT | Performed by: INTERNAL MEDICINE

## 2025-04-02 PROCEDURE — 3074F SYST BP LT 130 MM HG: CPT | Performed by: INTERNAL MEDICINE

## 2025-04-02 PROCEDURE — 3078F DIAST BP <80 MM HG: CPT | Performed by: INTERNAL MEDICINE

## 2025-04-02 PROCEDURE — 93000 ELECTROCARDIOGRAM COMPLETE: CPT | Performed by: INTERNAL MEDICINE

## 2025-04-07 ENCOUNTER — HOSPITAL ENCOUNTER (OUTPATIENT)
Dept: WOUND CARE | Age: 60
Discharge: HOME | End: 2025-04-07
Payer: MEDICAID

## 2025-04-07 VITALS
HEART RATE: 75 BPM | RESPIRATION RATE: 18 BRPM | DIASTOLIC BLOOD PRESSURE: 76 MMHG | TEMPERATURE: 97.3 F | SYSTOLIC BLOOD PRESSURE: 146 MMHG

## 2025-04-07 DIAGNOSIS — R15.9 DERMATITIS ASSOCIATED WITH MOISTURE FROM STOOL INCONTINENCE: ICD-10-CM

## 2025-04-07 DIAGNOSIS — G82.20 PARAPLEGIA: ICD-10-CM

## 2025-04-07 DIAGNOSIS — E66.812 CLASS 2 SEVERE OBESITY DUE TO EXCESS CALORIES WITH SERIOUS COMORBIDITY AND BODY MASS INDEX (BMI) OF 35.0 TO 35.9 IN ADULT: ICD-10-CM

## 2025-04-07 DIAGNOSIS — Z79.4 TYPE 2 DIABETES MELLITUS WITH OTHER SKIN COMPLICATION, WITH LONG-TERM CURRENT USE OF INSULIN: ICD-10-CM

## 2025-04-07 DIAGNOSIS — L89.324 PRESSURE ULCER OF LEFT BUTTOCK, STAGE 4 (HCC): Primary | ICD-10-CM

## 2025-04-07 DIAGNOSIS — L25.8 DERMATITIS ASSOCIATED WITH MOISTURE FROM STOOL INCONTINENCE: ICD-10-CM

## 2025-04-07 DIAGNOSIS — E11.628 TYPE 2 DIABETES MELLITUS WITH OTHER SKIN COMPLICATION, WITH LONG-TERM CURRENT USE OF INSULIN: ICD-10-CM

## 2025-04-07 DIAGNOSIS — L89.314 PRESSURE ULCER OF RIGHT BUTTOCK, STAGE 4 (HCC): ICD-10-CM

## 2025-04-07 DIAGNOSIS — E66.01 CLASS 2 SEVERE OBESITY DUE TO EXCESS CALORIES WITH SERIOUS COMORBIDITY AND BODY MASS INDEX (BMI) OF 35.0 TO 35.9 IN ADULT: ICD-10-CM

## 2025-04-07 PROBLEM — E66.9 OBESITY WITH SERIOUS COMORBIDITY: Status: ACTIVE | Noted: 2025-04-07

## 2025-04-07 PROCEDURE — 11042 DBRDMT SUBQ TIS 1ST 20SQCM/<: CPT

## 2025-04-07 RX ORDER — LIDOCAINE HYDROCHLORIDE 20 MG/ML
JELLY TOPICAL ONCE
OUTPATIENT
Start: 2025-04-07 | End: 2025-04-07

## 2025-04-07 RX ORDER — MUPIROCIN 20 MG/G
OINTMENT TOPICAL ONCE
OUTPATIENT
Start: 2025-04-07 | End: 2025-04-07

## 2025-04-07 RX ORDER — BACITRACIN ZINC AND POLYMYXIN B SULFATE 500; 1000 [USP'U]/G; [USP'U]/G
OINTMENT TOPICAL ONCE
OUTPATIENT
Start: 2025-04-07 | End: 2025-04-07

## 2025-04-07 RX ORDER — LIDOCAINE 40 MG/G
CREAM TOPICAL ONCE
OUTPATIENT
Start: 2025-04-07 | End: 2025-04-07

## 2025-04-07 RX ORDER — LIDOCAINE 50 MG/G
OINTMENT TOPICAL ONCE
OUTPATIENT
Start: 2025-04-07 | End: 2025-04-07

## 2025-04-07 RX ORDER — SILVER SULFADIAZINE 10 MG/G
CREAM TOPICAL ONCE
OUTPATIENT
Start: 2025-04-07 | End: 2025-04-07

## 2025-04-07 RX ORDER — GENTAMICIN SULFATE 1 MG/G
OINTMENT TOPICAL ONCE
OUTPATIENT
Start: 2025-04-07 | End: 2025-04-07

## 2025-04-07 RX ORDER — TRIAMCINOLONE ACETONIDE 1 MG/G
OINTMENT TOPICAL ONCE
OUTPATIENT
Start: 2025-04-07 | End: 2025-04-07

## 2025-04-07 RX ORDER — SODIUM CHLOR/HYPOCHLOROUS ACID 0.033 %
SOLUTION, IRRIGATION IRRIGATION ONCE
OUTPATIENT
Start: 2025-04-07 | End: 2025-04-07

## 2025-04-07 RX ORDER — GINSENG 100 MG
CAPSULE ORAL ONCE
OUTPATIENT
Start: 2025-04-07 | End: 2025-04-07

## 2025-04-07 RX ORDER — NEOMYCIN/BACITRACIN/POLYMYXINB 3.5-400-5K
OINTMENT (GRAM) TOPICAL ONCE
OUTPATIENT
Start: 2025-04-07 | End: 2025-04-07

## 2025-04-07 RX ORDER — LIDOCAINE HYDROCHLORIDE 40 MG/ML
SOLUTION TOPICAL ONCE
OUTPATIENT
Start: 2025-04-07 | End: 2025-04-07

## 2025-04-07 RX ORDER — BETAMETHASONE DIPROPIONATE 0.5 MG/G
CREAM TOPICAL ONCE
OUTPATIENT
Start: 2025-04-07 | End: 2025-04-07

## 2025-04-07 RX ORDER — CLOBETASOL PROPIONATE 0.5 MG/G
OINTMENT TOPICAL ONCE
OUTPATIENT
Start: 2025-04-07 | End: 2025-04-07

## 2025-04-07 RX ORDER — LIDOCAINE HYDROCHLORIDE 40 MG/ML
SOLUTION TOPICAL ONCE
Status: COMPLETED | OUTPATIENT
Start: 2025-04-07 | End: 2025-04-07

## 2025-04-07 RX ADMIN — LIDOCAINE HYDROCHLORIDE: 40 SOLUTION TOPICAL at 13:39

## 2025-04-07 ASSESSMENT — PAIN SCALES - GENERAL: PAINLEVEL_OUTOF10: 0

## 2025-04-07 NOTE — PATIENT INSTRUCTIONS
please contact the [facility] Wound Center at [phone] MONDAY - THURSDAY 8:00 am - 4:30 pm and Friday 8:00 am - 12:30 pm.  If you need help with your wound outside these hours and cannot wait until we are again available, contact your PCP or go to the hospital emergency room.     PLEASE NOTE: IF YOU ARE UNABLE TO OBTAIN WOUND SUPPLIES, CONTINUE TO USE THE SUPPLIES YOU HAVE AVAILABLE UNTIL YOU ARE ABLE TO REACH US. IT IS MOST IMPORTANT TO KEEP THE WOUND COVERED AT ALL TIMES.         Physician Signature:_______________________    Date: ___________ Time:  ____________          Jose Angel Argueta CNP
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
## 2025-04-14 ENCOUNTER — HOSPITAL ENCOUNTER (OUTPATIENT)
Dept: WOUND CARE | Age: 60
Discharge: HOME | End: 2025-04-14
Payer: MEDICAID

## 2025-04-14 VITALS
DIASTOLIC BLOOD PRESSURE: 69 MMHG | HEART RATE: 76 BPM | SYSTOLIC BLOOD PRESSURE: 128 MMHG | RESPIRATION RATE: 20 BRPM | TEMPERATURE: 97.2 F

## 2025-04-14 DIAGNOSIS — E66.01 CLASS 2 SEVERE OBESITY DUE TO EXCESS CALORIES WITH SERIOUS COMORBIDITY AND BODY MASS INDEX (BMI) OF 35.0 TO 35.9 IN ADULT: ICD-10-CM

## 2025-04-14 DIAGNOSIS — L89.314 PRESSURE ULCER OF RIGHT BUTTOCK, STAGE 4 (HCC): ICD-10-CM

## 2025-04-14 DIAGNOSIS — G82.20 PARAPLEGIA: ICD-10-CM

## 2025-04-14 DIAGNOSIS — E11.628 TYPE 2 DIABETES MELLITUS WITH OTHER SKIN COMPLICATION, WITH LONG-TERM CURRENT USE OF INSULIN: ICD-10-CM

## 2025-04-14 DIAGNOSIS — E66.812 CLASS 2 SEVERE OBESITY DUE TO EXCESS CALORIES WITH SERIOUS COMORBIDITY AND BODY MASS INDEX (BMI) OF 35.0 TO 35.9 IN ADULT: ICD-10-CM

## 2025-04-14 DIAGNOSIS — Z79.4 TYPE 2 DIABETES MELLITUS WITH OTHER SKIN COMPLICATION, WITH LONG-TERM CURRENT USE OF INSULIN: ICD-10-CM

## 2025-04-14 DIAGNOSIS — L89.324 PRESSURE ULCER OF LEFT BUTTOCK, STAGE 4 (HCC): Primary | ICD-10-CM

## 2025-04-14 PROCEDURE — 11045 DBRDMT SUBQ TISS EACH ADDL: CPT

## 2025-04-14 PROCEDURE — 11042 DBRDMT SUBQ TIS 1ST 20SQCM/<: CPT

## 2025-04-14 RX ORDER — BETAMETHASONE DIPROPIONATE 0.5 MG/G
CREAM TOPICAL ONCE
OUTPATIENT
Start: 2025-04-14 | End: 2025-04-14

## 2025-04-14 RX ORDER — LIDOCAINE HYDROCHLORIDE 40 MG/ML
SOLUTION TOPICAL ONCE
Status: COMPLETED | OUTPATIENT
Start: 2025-04-14 | End: 2025-04-14

## 2025-04-14 RX ORDER — NEOMYCIN/BACITRACIN/POLYMYXINB 3.5-400-5K
OINTMENT (GRAM) TOPICAL ONCE
OUTPATIENT
Start: 2025-04-14 | End: 2025-04-14

## 2025-04-14 RX ORDER — LIDOCAINE HYDROCHLORIDE 20 MG/ML
JELLY TOPICAL ONCE
OUTPATIENT
Start: 2025-04-14 | End: 2025-04-14

## 2025-04-14 RX ORDER — SODIUM CHLOR/HYPOCHLOROUS ACID 0.033 %
SOLUTION, IRRIGATION IRRIGATION ONCE
OUTPATIENT
Start: 2025-04-14 | End: 2025-04-14

## 2025-04-14 RX ORDER — MUPIROCIN 20 MG/G
OINTMENT TOPICAL ONCE
OUTPATIENT
Start: 2025-04-14 | End: 2025-04-14

## 2025-04-14 RX ORDER — SILVER SULFADIAZINE 10 MG/G
CREAM TOPICAL ONCE
OUTPATIENT
Start: 2025-04-14 | End: 2025-04-14

## 2025-04-14 RX ORDER — CLOBETASOL PROPIONATE 0.5 MG/G
OINTMENT TOPICAL ONCE
OUTPATIENT
Start: 2025-04-14 | End: 2025-04-14

## 2025-04-14 RX ORDER — LIDOCAINE HYDROCHLORIDE 40 MG/ML
SOLUTION TOPICAL ONCE
OUTPATIENT
Start: 2025-04-14 | End: 2025-04-14

## 2025-04-14 RX ORDER — GENTAMICIN SULFATE 1 MG/G
OINTMENT TOPICAL ONCE
OUTPATIENT
Start: 2025-04-14 | End: 2025-04-14

## 2025-04-14 RX ORDER — LIDOCAINE 50 MG/G
OINTMENT TOPICAL ONCE
OUTPATIENT
Start: 2025-04-14 | End: 2025-04-14

## 2025-04-14 RX ORDER — TRIAMCINOLONE ACETONIDE 1 MG/G
OINTMENT TOPICAL ONCE
OUTPATIENT
Start: 2025-04-14 | End: 2025-04-14

## 2025-04-14 RX ORDER — GINSENG 100 MG
CAPSULE ORAL ONCE
OUTPATIENT
Start: 2025-04-14 | End: 2025-04-14

## 2025-04-14 RX ORDER — BACITRACIN ZINC AND POLYMYXIN B SULFATE 500; 1000 [USP'U]/G; [USP'U]/G
OINTMENT TOPICAL ONCE
OUTPATIENT
Start: 2025-04-14 | End: 2025-04-14

## 2025-04-14 RX ORDER — LIDOCAINE 40 MG/G
CREAM TOPICAL ONCE
OUTPATIENT
Start: 2025-04-14 | End: 2025-04-14

## 2025-04-14 RX ADMIN — LIDOCAINE HYDROCHLORIDE 5 ML: 40 SOLUTION TOPICAL at 13:20

## 2025-04-14 ASSESSMENT — PAIN SCALES - GENERAL: PAINLEVEL_OUTOF10: 0

## 2025-04-14 NOTE — PATIENT INSTRUCTIONS
[facility] Wound Care Physician Orders and Discharge Instructions  [facility]  [address]  [place], [place] [address]  Telephone: [phone]      FAX [phone]  MONDAY - THURSDAY 8:00 am - 4:30 pm and Friday 8:00 am - 12:00 pm.        NAME:  César Saunders  YOB: 1965  MEDICAL RECORD NUMBER:  [id number]  DATE:  4/14/2025      Return Appointment:  [x] Return Appointment: With Jose Angel Argueta CNP  in  1 Week(s)   [] Wound and dressing supply provider:   [x] ECF or Home Healthcare: [facility]   [] Wound Assessment: [] Physician or NP scheduled for Wound Assessment:   [] Orders placed during your visit:       Important Reminders:   Please wash hands with soap and water before and after every dressing change.  Do not scrub wounds.  Keep wounds dry in shower unless otherwise instructed by the physician.  SMOKING can slow would healing. Stop smoking as soon as possible to improve healing and prevent further complications associated with smoking.      Angelica-Wound Topical Treatments:  Do not apply lotions, creams, or ointments to wound bed unless directed.   [] Apply moisturizing lotion to skin surrounding the wound prior to dressing change.  [] Apply antifungal ointment to skin surrounding the wound prior to dressing change.  [x] Apply thin film of no sting moisture barrier ointment to skin immediately around  wound. (Skin Prep)  [] Other:     **TRIAD TO SCROTUM ONLY - APPLY DAILY**      Wound Location: RIGHT AND LEFT BUTTOCK WOUNDS    4/14/2025  Pressure Stage 4- Right Buttock 7 cm x 2 cm x 0.3 cm   Pressure Stage 4- Left Buttock  4 cm x 4.3 cm x 0.5 cm       Wound Cleansing: Dakins Wash X 5 MINUTE SOAK AT WOUND CARE CENTER ONLY    Primary Dressing:  []   [x]Silver Alginate    Secondary Dressing:  [x]Brief  []       Dressing Frequency:  [x] DAILY      Compression and Edema Control:  [] Wear Home Compression Stockings   [] Spandagrip to:    Size: []Low

## 2025-04-21 ENCOUNTER — HOSPITAL ENCOUNTER (OUTPATIENT)
Dept: WOUND CARE | Age: 60
Discharge: HOME | End: 2025-04-21
Payer: MEDICAID

## 2025-04-21 VITALS
TEMPERATURE: 97.2 F | SYSTOLIC BLOOD PRESSURE: 136 MMHG | RESPIRATION RATE: 18 BRPM | DIASTOLIC BLOOD PRESSURE: 67 MMHG | HEART RATE: 69 BPM

## 2025-04-21 DIAGNOSIS — E66.01 CLASS 2 SEVERE OBESITY DUE TO EXCESS CALORIES WITH SERIOUS COMORBIDITY AND BODY MASS INDEX (BMI) OF 35.0 TO 35.9 IN ADULT (HCC): ICD-10-CM

## 2025-04-21 DIAGNOSIS — L25.8 DERMATITIS ASSOCIATED WITH MOISTURE FROM STOOL INCONTINENCE: ICD-10-CM

## 2025-04-21 DIAGNOSIS — R15.9 DERMATITIS ASSOCIATED WITH MOISTURE FROM STOOL INCONTINENCE: ICD-10-CM

## 2025-04-21 DIAGNOSIS — L89.324 PRESSURE ULCER OF LEFT BUTTOCK, STAGE 4 (HCC): Primary | ICD-10-CM

## 2025-04-21 DIAGNOSIS — L89.314 PRESSURE ULCER OF RIGHT BUTTOCK, STAGE 4 (HCC): ICD-10-CM

## 2025-04-21 DIAGNOSIS — G82.20 PARAPLEGIA (HCC): ICD-10-CM

## 2025-04-21 DIAGNOSIS — E66.812 CLASS 2 SEVERE OBESITY DUE TO EXCESS CALORIES WITH SERIOUS COMORBIDITY AND BODY MASS INDEX (BMI) OF 35.0 TO 35.9 IN ADULT (HCC): ICD-10-CM

## 2025-04-21 PROCEDURE — 11042 DBRDMT SUBQ TIS 1ST 20SQCM/<: CPT

## 2025-04-21 PROCEDURE — 11045 DBRDMT SUBQ TISS EACH ADDL: CPT

## 2025-04-21 RX ORDER — CLOBETASOL PROPIONATE 0.5 MG/G
OINTMENT TOPICAL ONCE
OUTPATIENT
Start: 2025-04-21 | End: 2025-04-21

## 2025-04-21 RX ORDER — LIDOCAINE HYDROCHLORIDE 20 MG/ML
JELLY TOPICAL ONCE
OUTPATIENT
Start: 2025-04-21 | End: 2025-04-21

## 2025-04-21 RX ORDER — SODIUM CHLOR/HYPOCHLOROUS ACID 0.033 %
SOLUTION, IRRIGATION IRRIGATION ONCE
OUTPATIENT
Start: 2025-04-21 | End: 2025-04-21

## 2025-04-21 RX ORDER — LIDOCAINE 50 MG/G
OINTMENT TOPICAL ONCE
OUTPATIENT
Start: 2025-04-21 | End: 2025-04-21

## 2025-04-21 RX ORDER — LIDOCAINE HYDROCHLORIDE 40 MG/ML
SOLUTION TOPICAL ONCE
OUTPATIENT
Start: 2025-04-21 | End: 2025-04-21

## 2025-04-21 RX ORDER — LIDOCAINE 40 MG/G
CREAM TOPICAL ONCE
OUTPATIENT
Start: 2025-04-21 | End: 2025-04-21

## 2025-04-21 RX ORDER — BETAMETHASONE DIPROPIONATE 0.5 MG/G
CREAM TOPICAL ONCE
OUTPATIENT
Start: 2025-04-21 | End: 2025-04-21

## 2025-04-21 RX ORDER — GINSENG 100 MG
CAPSULE ORAL ONCE
OUTPATIENT
Start: 2025-04-21 | End: 2025-04-21

## 2025-04-21 RX ORDER — MUPIROCIN 20 MG/G
OINTMENT TOPICAL ONCE
OUTPATIENT
Start: 2025-04-21 | End: 2025-04-21

## 2025-04-21 RX ORDER — TRIAMCINOLONE ACETONIDE 1 MG/G
OINTMENT TOPICAL ONCE
OUTPATIENT
Start: 2025-04-21 | End: 2025-04-21

## 2025-04-21 RX ORDER — LIDOCAINE HYDROCHLORIDE 40 MG/ML
SOLUTION TOPICAL ONCE
Status: COMPLETED | OUTPATIENT
Start: 2025-04-21 | End: 2025-04-21

## 2025-04-21 RX ORDER — NEOMYCIN/BACITRACIN/POLYMYXINB 3.5-400-5K
OINTMENT (GRAM) TOPICAL ONCE
OUTPATIENT
Start: 2025-04-21 | End: 2025-04-21

## 2025-04-21 RX ORDER — GENTAMICIN SULFATE 1 MG/G
OINTMENT TOPICAL ONCE
OUTPATIENT
Start: 2025-04-21 | End: 2025-04-21

## 2025-04-21 RX ORDER — BACITRACIN ZINC AND POLYMYXIN B SULFATE 500; 1000 [USP'U]/G; [USP'U]/G
OINTMENT TOPICAL ONCE
OUTPATIENT
Start: 2025-04-21 | End: 2025-04-21

## 2025-04-21 RX ORDER — SILVER SULFADIAZINE 10 MG/G
CREAM TOPICAL ONCE
OUTPATIENT
Start: 2025-04-21 | End: 2025-04-21

## 2025-04-21 RX ADMIN — LIDOCAINE HYDROCHLORIDE: 40 SOLUTION TOPICAL at 13:21

## 2025-04-21 ASSESSMENT — PAIN SCALES - GENERAL: PAINLEVEL_OUTOF10: 0
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
## 2025-04-21 NOTE — PATIENT INSTRUCTIONS
am - 4:30 pm and Friday 8:00 am - 12:30 pm.  If you need help with your wound outside these hours and cannot wait until we are again available, contact your PCP or go to the hospital emergency room.     PLEASE NOTE: IF YOU ARE UNABLE TO OBTAIN WOUND SUPPLIES, CONTINUE TO USE THE SUPPLIES YOU HAVE AVAILABLE UNTIL YOU ARE ABLE TO REACH US. IT IS MOST IMPORTANT TO KEEP THE WOUND COVERED AT ALL TIMES.         Physician Signature:_______________________    Date: ___________ Time:  ____________          Jose Angel Argueta CNP

## 2025-04-28 ENCOUNTER — HOSPITAL ENCOUNTER (OUTPATIENT)
Dept: WOUND CARE | Age: 60
Discharge: HOME | End: 2025-04-28
Payer: MEDICAID

## 2025-04-28 ENCOUNTER — TELEPHONE (OUTPATIENT)
Dept: WOUND CARE | Age: 60
End: 2025-04-28

## 2025-04-28 VITALS
SYSTOLIC BLOOD PRESSURE: 130 MMHG | DIASTOLIC BLOOD PRESSURE: 78 MMHG | TEMPERATURE: 98.2 F | RESPIRATION RATE: 18 BRPM | HEART RATE: 71 BPM

## 2025-04-28 DIAGNOSIS — L89.314 PRESSURE ULCER OF RIGHT BUTTOCK, STAGE 4 (HCC): Primary | ICD-10-CM

## 2025-04-28 DIAGNOSIS — L89.324 PRESSURE ULCER OF LEFT BUTTOCK, STAGE 4 (HCC): Primary | ICD-10-CM

## 2025-04-28 DIAGNOSIS — L89.314 PRESSURE ULCER OF RIGHT BUTTOCK, STAGE 4 (HCC): ICD-10-CM

## 2025-04-28 DIAGNOSIS — L89.324 PRESSURE ULCER OF LEFT BUTTOCK, STAGE 4 (HCC): ICD-10-CM

## 2025-04-28 PROCEDURE — 11042 DBRDMT SUBQ TIS 1ST 20SQCM/<: CPT | Performed by: NURSE PRACTITIONER

## 2025-04-28 PROCEDURE — 11045 DBRDMT SUBQ TISS EACH ADDL: CPT

## 2025-04-28 PROCEDURE — 11042 DBRDMT SUBQ TIS 1ST 20SQCM/<: CPT

## 2025-04-28 PROCEDURE — 11045 DBRDMT SUBQ TISS EACH ADDL: CPT | Performed by: NURSE PRACTITIONER

## 2025-04-28 RX ORDER — CLOBETASOL PROPIONATE 0.5 MG/G
OINTMENT TOPICAL ONCE
OUTPATIENT
Start: 2025-04-28 | End: 2025-04-28

## 2025-04-28 RX ORDER — LIDOCAINE 50 MG/G
OINTMENT TOPICAL ONCE
OUTPATIENT
Start: 2025-04-28 | End: 2025-04-28

## 2025-04-28 RX ORDER — LIDOCAINE HYDROCHLORIDE 40 MG/ML
SOLUTION TOPICAL ONCE
Status: COMPLETED | OUTPATIENT
Start: 2025-04-28 | End: 2025-04-28

## 2025-04-28 RX ORDER — LIDOCAINE HYDROCHLORIDE 20 MG/ML
JELLY TOPICAL ONCE
OUTPATIENT
Start: 2025-04-28 | End: 2025-04-28

## 2025-04-28 RX ORDER — LIDOCAINE 40 MG/G
CREAM TOPICAL ONCE
OUTPATIENT
Start: 2025-04-28 | End: 2025-04-28

## 2025-04-28 RX ORDER — SILVER SULFADIAZINE 10 MG/G
CREAM TOPICAL ONCE
OUTPATIENT
Start: 2025-04-28 | End: 2025-04-28

## 2025-04-28 RX ORDER — LIDOCAINE HYDROCHLORIDE 40 MG/ML
SOLUTION TOPICAL ONCE
OUTPATIENT
Start: 2025-04-28 | End: 2025-04-28

## 2025-04-28 RX ORDER — MUPIROCIN 20 MG/G
OINTMENT TOPICAL ONCE
OUTPATIENT
Start: 2025-04-28 | End: 2025-04-28

## 2025-04-28 RX ORDER — BETAMETHASONE DIPROPIONATE 0.5 MG/G
CREAM TOPICAL ONCE
OUTPATIENT
Start: 2025-04-28 | End: 2025-04-28

## 2025-04-28 RX ORDER — TRIAMCINOLONE ACETONIDE 1 MG/G
OINTMENT TOPICAL ONCE
OUTPATIENT
Start: 2025-04-28 | End: 2025-04-28

## 2025-04-28 RX ORDER — BACITRACIN ZINC AND POLYMYXIN B SULFATE 500; 1000 [USP'U]/G; [USP'U]/G
OINTMENT TOPICAL ONCE
OUTPATIENT
Start: 2025-04-28 | End: 2025-04-28

## 2025-04-28 RX ORDER — SODIUM CHLOR/HYPOCHLOROUS ACID 0.033 %
SOLUTION, IRRIGATION IRRIGATION ONCE
OUTPATIENT
Start: 2025-04-28 | End: 2025-04-28

## 2025-04-28 RX ORDER — NEOMYCIN/BACITRACIN/POLYMYXINB 3.5-400-5K
OINTMENT (GRAM) TOPICAL ONCE
OUTPATIENT
Start: 2025-04-28 | End: 2025-04-28

## 2025-04-28 RX ORDER — GINSENG 100 MG
CAPSULE ORAL ONCE
OUTPATIENT
Start: 2025-04-28 | End: 2025-04-28

## 2025-04-28 RX ORDER — GENTAMICIN SULFATE 1 MG/G
OINTMENT TOPICAL ONCE
OUTPATIENT
Start: 2025-04-28 | End: 2025-04-28

## 2025-04-28 RX ADMIN — LIDOCAINE HYDROCHLORIDE: 40 SOLUTION TOPICAL at 13:15

## 2025-04-28 ASSESSMENT — PAIN SCALES - GENERAL: PAINLEVEL_OUTOF10: 0

## 2025-04-28 NOTE — TELEPHONE ENCOUNTER
Patient's facility, [facility] was notified in regards to patient not having wound care products on his wounds x 2 weeks. Message left for Anuja to return call as soon as possible.

## 2025-04-28 NOTE — PROGRESS NOTES
[facility]     Note Type: Medical Staff Progress Note    Referring Provider: Self Referral  Reason for Referral: Bilateral buttock wounds    César Saunders  MEDICAL RECORD NUMBER:  [id number]  AGE: 59 y.o.   GENDER: male  : 1965  EPISODE DATE:  2025    Chief complaint and reason for visit:     Chief Complaint   Patient presents with    Wound Check     Follow up visit for coccyx wound      HPI/Wound Narrative:      César Saunders is a 59 y.o. male who presents today for an evaluation of a wound/ulcer. Wound duration:  2-3 year(s).  Patient presents for returning wound care visit for bilateral chronic gluteal wounds.  Patient resides at [facility] on the medical unit for assistance with dressing changes.  Patient paraplegic and uses a motorized scooter with a high level seating cushion.  History of diabetes, hypertension, obesity, and chronic pressure injuries.  Current treatment: unknown.  He denies pain, fever, chills and/or nausea/vomiting    Wound/Ulcer Pain Timing/Severity: none  Quality of pain: N/A  Severity:  0 / 10   Modifying Factors: None  Associated Signs/Symptoms: none    2025: Incorrect dressing-ABD only noted on patient on arrival from nursing home.  Treatment changed last week to accommodate nursing facility as the patient states he was told they do not have Hydrofera Blue transfer to apply to wound as ordered.  Patient states he was told this week that the facility did not have alginate with silver dressings to apply to buttock wounds.  Buttock wounds worsening progressively worsening with scrotal skin irritation worsening likely related to improper dressings applied to wounds.  Patient denies refusing treatments as he states his wounds get changed daily at 6 AM with incorrect wound supplies.  Provider called [facility] and left voicemail awaiting for callback.  Continue current treatment with alginate with silver

## 2025-04-28 NOTE — PATIENT INSTRUCTIONS
compression 5-10 mm/Hg      []Medium compression 10-20 mm/Hg           []High compression  20-30 mm/Hg  [x] Ace Wrap Toes to Knee to  RIGHT AND LEFT LEG - **REAPPLY DAILY PER NURSING STAFF**  [] Multilayer Compression Wrap:  to    Do not get leg(s) with wrap wet.  If wraps become too tight call the center or completely remove the wrap.       Pressure Relief and Off Loading: LOW AIR LOSS MATTRESS.  HAS WHEELCHAIR CUSHION.  [x] Off-loading when [] walking  [x] in bed [x] sitting   Turn every 2 hours when in bed   Avoid putting direct pressure on the site of the wound. Limit side lying to 30 degree tilt. Limit elevating the head of the bed greater than 30 degrees.  LIMIT TIME IN CHAIR TO 4 HOURS TWICE PER DAY ( MORNING AND EVENING )                                      [x] Assistive Devices   MOTORIZED WHEELCHAIR  Use as instructed by the provider      Activity: Activity as Tolerated      Dietary:   Continue your diet as tolerated.  Protein is a key nutrient in helping to repair damaged tissue and promote new tissue growth. Good sources of protein include milk, yogurt, cheese, fish, lean meat and beans.  If you are DIABETIC, having diabetes can make it hard for wounds to heal. Try to keep your blood sugar within it's target range.  Limit Sodium, Alcohol and Sugar.    Pain:   Please Note some pain, drainage and/or bleeding might be expected after seeing the provider. TO HELP ALLEVIATE PAIN WE RECOMMEND THE FOLLOWING  Elevate the affected limb.  Use over the counter medications as permitted by your family doctor.  For Persistent Pain not relieved by the above interventions, please notify your family doctor.        : ANGÉLICA    Electronically signed by Angélica Brennan RN on 4/28/2025 at 1:26 PM       Wound Care Center Information: Should you experience any significant changes in your wound(s) or have questions about your wound care, please contact the [facility] at [phone] MONDAY - THURSDAY 8:00

## 2025-04-30 ENCOUNTER — TELEPHONE (OUTPATIENT)
Dept: WOUND CARE | Age: 60
End: 2025-04-30

## 2025-04-30 DIAGNOSIS — L89.314 PRESSURE ULCER OF RIGHT BUTTOCK, STAGE 4 (HCC): Primary | ICD-10-CM

## 2025-04-30 DIAGNOSIS — L89.324 PRESSURE ULCER OF LEFT BUTTOCK, STAGE 4 (HCC): ICD-10-CM

## 2025-04-30 NOTE — TELEPHONE ENCOUNTER
Aaron, wound care nurse from [facility], called to notify me of patient not allowing staff to perform wound dressing changes routinely. Aaron expressed that the facility does have Alginate with Silver in stock but patient is unwilling to allow them to do it. She also reported that he is up in his electric wheelchair most of the day and does not follow the 4 hour repositioning order in place. Will discuss with Jose Angel Argueta CNP and patient on Monday 5/5/2025 at his follow up appointment.

## 2025-05-05 ENCOUNTER — HOSPITAL ENCOUNTER (OUTPATIENT)
Dept: WOUND CARE | Age: 60
Discharge: HOME | End: 2025-05-05
Payer: MEDICAID

## 2025-05-05 VITALS
DIASTOLIC BLOOD PRESSURE: 84 MMHG | SYSTOLIC BLOOD PRESSURE: 133 MMHG | RESPIRATION RATE: 18 BRPM | TEMPERATURE: 97.5 F | HEART RATE: 78 BPM

## 2025-05-05 DIAGNOSIS — L89.314 PRESSURE ULCER OF RIGHT BUTTOCK, STAGE 4 (HCC): ICD-10-CM

## 2025-05-05 DIAGNOSIS — E66.812 CLASS 2 SEVERE OBESITY DUE TO EXCESS CALORIES WITH SERIOUS COMORBIDITY AND BODY MASS INDEX (BMI) OF 35.0 TO 35.9 IN ADULT (HCC): ICD-10-CM

## 2025-05-05 DIAGNOSIS — R15.9 DERMATITIS ASSOCIATED WITH MOISTURE FROM STOOL INCONTINENCE: ICD-10-CM

## 2025-05-05 DIAGNOSIS — Z87.828 HISTORY OF GUNSHOT WOUND: ICD-10-CM

## 2025-05-05 DIAGNOSIS — Z79.4 TYPE 2 DIABETES MELLITUS WITH OTHER SKIN COMPLICATION, WITH LONG-TERM CURRENT USE OF INSULIN (HCC): ICD-10-CM

## 2025-05-05 DIAGNOSIS — E11.628 TYPE 2 DIABETES MELLITUS WITH OTHER SKIN COMPLICATION, WITH LONG-TERM CURRENT USE OF INSULIN (HCC): ICD-10-CM

## 2025-05-05 DIAGNOSIS — L25.8 DERMATITIS ASSOCIATED WITH MOISTURE FROM STOOL INCONTINENCE: ICD-10-CM

## 2025-05-05 DIAGNOSIS — G82.20 PARAPLEGIA (HCC): ICD-10-CM

## 2025-05-05 DIAGNOSIS — E66.01 CLASS 2 SEVERE OBESITY DUE TO EXCESS CALORIES WITH SERIOUS COMORBIDITY AND BODY MASS INDEX (BMI) OF 35.0 TO 35.9 IN ADULT (HCC): ICD-10-CM

## 2025-05-05 DIAGNOSIS — L89.324 PRESSURE ULCER OF LEFT BUTTOCK, STAGE 4 (HCC): Primary | ICD-10-CM

## 2025-05-05 PROCEDURE — 11042 DBRDMT SUBQ TIS 1ST 20SQCM/<: CPT

## 2025-05-05 PROCEDURE — 11045 DBRDMT SUBQ TISS EACH ADDL: CPT

## 2025-05-05 RX ORDER — LIDOCAINE HYDROCHLORIDE 20 MG/ML
JELLY TOPICAL ONCE
OUTPATIENT
Start: 2025-05-05 | End: 2025-05-05

## 2025-05-05 RX ORDER — SODIUM CHLOR/HYPOCHLOROUS ACID 0.033 %
SOLUTION, IRRIGATION IRRIGATION ONCE
OUTPATIENT
Start: 2025-05-05 | End: 2025-05-05

## 2025-05-05 RX ORDER — LIDOCAINE HYDROCHLORIDE 40 MG/ML
SOLUTION TOPICAL ONCE
Status: COMPLETED | OUTPATIENT
Start: 2025-05-05 | End: 2025-05-05

## 2025-05-05 RX ORDER — BETAMETHASONE DIPROPIONATE 0.5 MG/G
CREAM TOPICAL ONCE
OUTPATIENT
Start: 2025-05-05 | End: 2025-05-05

## 2025-05-05 RX ORDER — LIDOCAINE 50 MG/G
OINTMENT TOPICAL ONCE
OUTPATIENT
Start: 2025-05-05 | End: 2025-05-05

## 2025-05-05 RX ORDER — MUPIROCIN 20 MG/G
OINTMENT TOPICAL ONCE
OUTPATIENT
Start: 2025-05-05 | End: 2025-05-05

## 2025-05-05 RX ORDER — CLOBETASOL PROPIONATE 0.5 MG/G
OINTMENT TOPICAL ONCE
OUTPATIENT
Start: 2025-05-05 | End: 2025-05-05

## 2025-05-05 RX ORDER — BACITRACIN ZINC AND POLYMYXIN B SULFATE 500; 1000 [USP'U]/G; [USP'U]/G
OINTMENT TOPICAL ONCE
OUTPATIENT
Start: 2025-05-05 | End: 2025-05-05

## 2025-05-05 RX ORDER — NEOMYCIN/BACITRACIN/POLYMYXINB 3.5-400-5K
OINTMENT (GRAM) TOPICAL ONCE
OUTPATIENT
Start: 2025-05-05 | End: 2025-05-05

## 2025-05-05 RX ORDER — SILVER SULFADIAZINE 10 MG/G
CREAM TOPICAL ONCE
OUTPATIENT
Start: 2025-05-05 | End: 2025-05-05

## 2025-05-05 RX ORDER — LIDOCAINE 40 MG/G
CREAM TOPICAL ONCE
OUTPATIENT
Start: 2025-05-05 | End: 2025-05-05

## 2025-05-05 RX ORDER — LIDOCAINE HYDROCHLORIDE 40 MG/ML
SOLUTION TOPICAL ONCE
OUTPATIENT
Start: 2025-05-05 | End: 2025-05-05

## 2025-05-05 RX ORDER — GENTAMICIN SULFATE 1 MG/G
OINTMENT TOPICAL ONCE
OUTPATIENT
Start: 2025-05-05 | End: 2025-05-05

## 2025-05-05 RX ORDER — TRIAMCINOLONE ACETONIDE 1 MG/G
OINTMENT TOPICAL ONCE
OUTPATIENT
Start: 2025-05-05 | End: 2025-05-05

## 2025-05-05 RX ORDER — GINSENG 100 MG
CAPSULE ORAL ONCE
OUTPATIENT
Start: 2025-05-05 | End: 2025-05-05

## 2025-05-05 RX ADMIN — LIDOCAINE HYDROCHLORIDE: 40 SOLUTION TOPICAL at 13:25

## 2025-05-05 ASSESSMENT — PAIN SCALES - GENERAL: PAINLEVEL_OUTOF10: 0

## 2025-05-05 NOTE — PATIENT INSTRUCTIONS
- 4:30 pm and Friday 8:00 am - 12:30 pm.  If you need help with your wound outside these hours and cannot wait until we are again available, contact your PCP or go to the hospital emergency room.     PLEASE NOTE: IF YOU ARE UNABLE TO OBTAIN WOUND SUPPLIES, CONTINUE TO USE THE SUPPLIES YOU HAVE AVAILABLE UNTIL YOU ARE ABLE TO REACH US. IT IS MOST IMPORTANT TO KEEP THE WOUND COVERED AT ALL TIMES.         Physician Signature:_______________________    Date: ___________ Time:  ____________          Jose Angel Argueta CNP

## 2025-05-05 NOTE — PROGRESS NOTES
[facility]     Note Type: Medical Staff Progress Note    Referring Provider: Self Referral  Reason for Referral: Bilateral buttock wounds    César Saunders  MEDICAL RECORD NUMBER:  [id number]  AGE: 59 y.o.   GENDER: male  : 1965  EPISODE DATE:  2025    Chief complaint and reason for visit:     Chief Complaint   Patient presents with    Wound Check     Follow up visit for bilateral buttock wounds      HPI/Wound Narrative:      César Saunders is a 59 y.o. male who presents today for an evaluation of a wound/ulcer. Wound duration:  2-3 year(s).  Patient presents for returning wound care visit for bilateral chronic gluteal wounds.  Patient resides at [facility] on the medical unit for assistance with dressing changes.  Patient paraplegic and uses a motorized scooter with a high level seating cushion.  History of diabetes, hypertension, obesity, and chronic pressure injuries.  Current treatment: unknown.  He denies pain, fever, chills and/or nausea/vomiting    Wound/Ulcer Pain Timing/Severity: none  Quality of pain: N/A  Severity:  0 / 10   Modifying Factors: None  Associated Signs/Symptoms: none    2025: This week incorrect dressing applied-Hydrofera Blue dressing; to patient's wound from nursing home on arrival.  Wound appearance improved.   Chrissy spoke with Henny wound care nurse at [facility] about challenges with dressing changes.  Patient states his dressing is changed daily at 6 AM and he does not refused.  No overt signs of infection or constitutional issues.  Continue current treatment with silver alginate daily.  Follow-up in 1 week.    2025: Incorrect dressing-ABD only noted on patient on arrival from nursing home.  Treatment changed last week to accommodate nursing facility as the patient states he was told they do not have Hydrofera Blue transfer to apply to wound as ordered.  Patient states he was told this week

## 2025-05-12 ENCOUNTER — HOSPITAL ENCOUNTER (OUTPATIENT)
Dept: WOUND CARE | Age: 60
Discharge: HOME | End: 2025-05-12
Payer: MEDICAID

## 2025-05-12 VITALS
HEART RATE: 68 BPM | RESPIRATION RATE: 20 BRPM | DIASTOLIC BLOOD PRESSURE: 77 MMHG | SYSTOLIC BLOOD PRESSURE: 130 MMHG | TEMPERATURE: 97.2 F

## 2025-05-12 DIAGNOSIS — E66.01 CLASS 2 SEVERE OBESITY DUE TO EXCESS CALORIES WITH SERIOUS COMORBIDITY AND BODY MASS INDEX (BMI) OF 35.0 TO 35.9 IN ADULT (HCC): ICD-10-CM

## 2025-05-12 DIAGNOSIS — L25.8 DERMATITIS ASSOCIATED WITH MOISTURE FROM STOOL INCONTINENCE: ICD-10-CM

## 2025-05-12 DIAGNOSIS — R15.9 DERMATITIS ASSOCIATED WITH MOISTURE FROM STOOL INCONTINENCE: ICD-10-CM

## 2025-05-12 DIAGNOSIS — L89.314 PRESSURE ULCER OF RIGHT BUTTOCK, STAGE 4 (HCC): ICD-10-CM

## 2025-05-12 DIAGNOSIS — L89.324 PRESSURE ULCER OF LEFT BUTTOCK, STAGE 4 (HCC): Primary | ICD-10-CM

## 2025-05-12 DIAGNOSIS — E66.812 CLASS 2 SEVERE OBESITY DUE TO EXCESS CALORIES WITH SERIOUS COMORBIDITY AND BODY MASS INDEX (BMI) OF 35.0 TO 35.9 IN ADULT (HCC): ICD-10-CM

## 2025-05-12 PROCEDURE — 11042 DBRDMT SUBQ TIS 1ST 20SQCM/<: CPT

## 2025-05-12 PROCEDURE — 11045 DBRDMT SUBQ TISS EACH ADDL: CPT

## 2025-05-12 RX ORDER — TRIAMCINOLONE ACETONIDE 1 MG/G
OINTMENT TOPICAL ONCE
OUTPATIENT
Start: 2025-05-12 | End: 2025-05-12

## 2025-05-12 RX ORDER — SODIUM CHLOR/HYPOCHLOROUS ACID 0.033 %
SOLUTION, IRRIGATION IRRIGATION ONCE
OUTPATIENT
Start: 2025-05-12 | End: 2025-05-12

## 2025-05-12 RX ORDER — LIDOCAINE 50 MG/G
OINTMENT TOPICAL ONCE
OUTPATIENT
Start: 2025-05-12 | End: 2025-05-12

## 2025-05-12 RX ORDER — MUPIROCIN 20 MG/G
OINTMENT TOPICAL ONCE
OUTPATIENT
Start: 2025-05-12 | End: 2025-05-12

## 2025-05-12 RX ORDER — LIDOCAINE HYDROCHLORIDE 40 MG/ML
SOLUTION TOPICAL ONCE
OUTPATIENT
Start: 2025-05-12 | End: 2025-05-12

## 2025-05-12 RX ORDER — LIDOCAINE HYDROCHLORIDE 20 MG/ML
JELLY TOPICAL ONCE
OUTPATIENT
Start: 2025-05-12 | End: 2025-05-12

## 2025-05-12 RX ORDER — GINSENG 100 MG
CAPSULE ORAL ONCE
OUTPATIENT
Start: 2025-05-12 | End: 2025-05-12

## 2025-05-12 RX ORDER — LIDOCAINE HYDROCHLORIDE 40 MG/ML
SOLUTION TOPICAL ONCE
Status: COMPLETED | OUTPATIENT
Start: 2025-05-12 | End: 2025-05-12

## 2025-05-12 RX ORDER — SILVER SULFADIAZINE 10 MG/G
CREAM TOPICAL ONCE
OUTPATIENT
Start: 2025-05-12 | End: 2025-05-12

## 2025-05-12 RX ORDER — GENTAMICIN SULFATE 1 MG/G
OINTMENT TOPICAL ONCE
OUTPATIENT
Start: 2025-05-12 | End: 2025-05-12

## 2025-05-12 RX ORDER — BETAMETHASONE DIPROPIONATE 0.5 MG/G
CREAM TOPICAL ONCE
OUTPATIENT
Start: 2025-05-12 | End: 2025-05-12

## 2025-05-12 RX ORDER — BACITRACIN ZINC AND POLYMYXIN B SULFATE 500; 1000 [USP'U]/G; [USP'U]/G
OINTMENT TOPICAL ONCE
OUTPATIENT
Start: 2025-05-12 | End: 2025-05-12

## 2025-05-12 RX ORDER — LIDOCAINE 40 MG/G
CREAM TOPICAL ONCE
OUTPATIENT
Start: 2025-05-12 | End: 2025-05-12

## 2025-05-12 RX ORDER — CLOBETASOL PROPIONATE 0.5 MG/G
OINTMENT TOPICAL ONCE
OUTPATIENT
Start: 2025-05-12 | End: 2025-05-12

## 2025-05-12 RX ORDER — NEOMYCIN/BACITRACIN/POLYMYXINB 3.5-400-5K
OINTMENT (GRAM) TOPICAL ONCE
OUTPATIENT
Start: 2025-05-12 | End: 2025-05-12

## 2025-05-12 RX ADMIN — LIDOCAINE HYDROCHLORIDE 10 ML: 40 SOLUTION TOPICAL at 13:14

## 2025-05-12 ASSESSMENT — PAIN SCALES - GENERAL: PAINLEVEL_OUTOF10: 0

## 2025-05-12 ASSESSMENT — PAIN SCALES - WONG BAKER: WONGBAKER_NUMERICALRESPONSE: NO HURT

## 2025-05-12 NOTE — PATIENT INSTRUCTIONS
[facility] Physician Orders and Discharge Instructions  [facility]  [address], [address]  [place], [place] [address]  Telephone: [phone]      FAX [phone]  MONDAY - THURSDAY 8:00 am - 4:30 pm and Friday 8:00 am - 12:00 pm.        NAME:  César Saunders  YOB: 1965  MEDICAL RECORD NUMBER:  [id number]  DATE:  5/12/2025      PLEASE CALL [facility] ASAP- [phone]    Return Appointment:  [x] Return Appointment: With Jose Angel Argueta CNP  in  1 Week(s)   [] Wound and dressing supply provider:   [x] ECF or Home Healthcare: [facility]   [] Wound Assessment: [] Physician or NP scheduled for Wound Assessment:   [] Orders placed during your visit:       Important Reminders:   Please wash hands with soap and water before and after every dressing change.  Do not scrub wounds.  Keep wounds dry in shower unless otherwise instructed by the physician.  SMOKING can slow would healing. Stop smoking as soon as possible to improve healing and prevent further complications associated with smoking.      Angelica-Wound Topical Treatments:  Do not apply lotions, creams, or ointments to wound bed unless directed.   [] Apply moisturizing lotion to skin surrounding the wound prior to dressing change.  [] Apply antifungal ointment to skin surrounding the wound prior to dressing change.  [x] Apply thin film of no sting moisture barrier ointment to skin immediately around  wound. (Skin Prep)  [] Other:           Wound Location: RIGHT AND LEFT BUTTOCK WOUNDS    5/12/2025  Pressure Stage 4- Right Buttock 7.7 cm x 3.7 cm x 0.5 cm   Pressure Stage 4- Left Buttock  5.5 cm x 5.4 cm x 0.4 cm       Wound Cleansing: Vashe Wash X 5 MINUTE SOAK AT WOUND CARE CENTER ONLY    **TRIAD TO SCROTUM ONLY - APPLY DAILY**    Primary Dressing:  []   [x]Silver Alginate    Secondary Dressing:  [x]Brief  []       Dressing Frequency:  [x] DAILY      Compression and Edema Control:  []

## 2025-05-12 NOTE — PROGRESS NOTES
[facility]     Note Type: Medical Staff Progress Note    Referring Provider: Self Referral  Reason for Referral: Bilateral buttock wounds    César Saunders  MEDICAL RECORD NUMBER:  [id number]  AGE: 60 y.o.   GENDER: male  : 1965  EPISODE DATE:  2025    Chief complaint and reason for visit:     Chief Complaint   Patient presents with    Wound Check     F/u visit - bilateral buttock wounds      HPI/Wound Narrative:      César Saunders is a 60 y.o. male who presents today for an evaluation of a wound/ulcer. Wound duration:  2-3 year(s).  Patient presents for returning wound care visit for bilateral chronic gluteal wounds.  Patient resides at [facility] the medical unit for assistance with dressing changes.  Patient paraplegic and uses a motorized scooter with a high level seating cushion.  History of diabetes, hypertension, obesity, and chronic pressure injuries.  Current treatment: unknown.  He denies pain, fever, chills and/or nausea/vomiting    Wound/Ulcer Pain Timing/Severity: none  Quality of pain: N/A  Severity:  0 / 10   Modifying Factors: None  Associated Signs/Symptoms: none    2025: At each visit x 3 weeks the patient has arrived with incorrect dressing applied to wound from nursing facility.   at wound care center will attempt to have wound care nurse at [facility] contact wound care center for care coordination.  Patient's remain stable with little progression likely related to improper dressings being applied to the wound.  No overt signs of infection or constitutional issues noted today.  Alginate with silver daily.  Follow-up in 1 week.    2025: This week incorrect dressing applied-Hydrofera Blue dressing; to patient's wound from nursing home on arrival.  Wound appearance improved.   Chrissy spoke with Henny wound care nurse at [facility] about challenges with dressing changes.  Patient states his

## 2025-05-19 ENCOUNTER — HOSPITAL ENCOUNTER (OUTPATIENT)
Dept: WOUND CARE | Age: 60
Discharge: HOME | End: 2025-05-19
Payer: MEDICAID

## 2025-05-19 VITALS
RESPIRATION RATE: 20 BRPM | DIASTOLIC BLOOD PRESSURE: 86 MMHG | SYSTOLIC BLOOD PRESSURE: 134 MMHG | TEMPERATURE: 97.2 F | HEART RATE: 79 BPM

## 2025-05-19 DIAGNOSIS — I83.029 VENOUS ULCER OF LEFT LEG (HCC): ICD-10-CM

## 2025-05-19 DIAGNOSIS — L89.324 PRESSURE ULCER OF LEFT BUTTOCK, STAGE 4 (HCC): Primary | ICD-10-CM

## 2025-05-19 DIAGNOSIS — L89.314 PRESSURE ULCER OF RIGHT BUTTOCK, STAGE 4 (HCC): ICD-10-CM

## 2025-05-19 DIAGNOSIS — L97.929 VENOUS ULCER OF LEFT LEG (HCC): ICD-10-CM

## 2025-05-19 PROCEDURE — 11045 DBRDMT SUBQ TISS EACH ADDL: CPT | Performed by: NURSE PRACTITIONER

## 2025-05-19 PROCEDURE — 11042 DBRDMT SUBQ TIS 1ST 20SQCM/<: CPT

## 2025-05-19 PROCEDURE — 11045 DBRDMT SUBQ TISS EACH ADDL: CPT

## 2025-05-19 PROCEDURE — 11042 DBRDMT SUBQ TIS 1ST 20SQCM/<: CPT | Performed by: NURSE PRACTITIONER

## 2025-05-19 RX ORDER — LIDOCAINE HYDROCHLORIDE 20 MG/ML
JELLY TOPICAL ONCE
OUTPATIENT
Start: 2025-05-19 | End: 2025-05-19

## 2025-05-19 RX ORDER — NEOMYCIN/BACITRACIN/POLYMYXINB 3.5-400-5K
OINTMENT (GRAM) TOPICAL ONCE
OUTPATIENT
Start: 2025-05-19 | End: 2025-05-19

## 2025-05-19 RX ORDER — LIDOCAINE HYDROCHLORIDE 40 MG/ML
SOLUTION TOPICAL ONCE
OUTPATIENT
Start: 2025-05-19 | End: 2025-05-19

## 2025-05-19 RX ORDER — GINSENG 100 MG
CAPSULE ORAL ONCE
OUTPATIENT
Start: 2025-05-19 | End: 2025-05-19

## 2025-05-19 RX ORDER — LIDOCAINE 40 MG/G
CREAM TOPICAL ONCE
OUTPATIENT
Start: 2025-05-19 | End: 2025-05-19

## 2025-05-19 RX ORDER — LIDOCAINE HYDROCHLORIDE 40 MG/ML
SOLUTION TOPICAL ONCE
Status: COMPLETED | OUTPATIENT
Start: 2025-05-19 | End: 2025-05-19

## 2025-05-19 RX ORDER — GENTAMICIN SULFATE 1 MG/G
OINTMENT TOPICAL ONCE
OUTPATIENT
Start: 2025-05-19 | End: 2025-05-19

## 2025-05-19 RX ORDER — SILVER SULFADIAZINE 10 MG/G
CREAM TOPICAL ONCE
OUTPATIENT
Start: 2025-05-19 | End: 2025-05-19

## 2025-05-19 RX ORDER — BACITRACIN ZINC AND POLYMYXIN B SULFATE 500; 1000 [USP'U]/G; [USP'U]/G
OINTMENT TOPICAL ONCE
OUTPATIENT
Start: 2025-05-19 | End: 2025-05-19

## 2025-05-19 RX ORDER — MUPIROCIN 20 MG/G
OINTMENT TOPICAL ONCE
OUTPATIENT
Start: 2025-05-19 | End: 2025-05-19

## 2025-05-19 RX ORDER — SODIUM CHLOR/HYPOCHLOROUS ACID 0.033 %
SOLUTION, IRRIGATION IRRIGATION ONCE
OUTPATIENT
Start: 2025-05-19 | End: 2025-05-19

## 2025-05-19 RX ORDER — LIDOCAINE 50 MG/G
OINTMENT TOPICAL ONCE
OUTPATIENT
Start: 2025-05-19 | End: 2025-05-19

## 2025-05-19 RX ORDER — TRIAMCINOLONE ACETONIDE 1 MG/G
OINTMENT TOPICAL ONCE
OUTPATIENT
Start: 2025-05-19 | End: 2025-05-19

## 2025-05-19 RX ORDER — BETAMETHASONE DIPROPIONATE 0.5 MG/G
CREAM TOPICAL ONCE
OUTPATIENT
Start: 2025-05-19 | End: 2025-05-19

## 2025-05-19 RX ORDER — CLOBETASOL PROPIONATE 0.5 MG/G
OINTMENT TOPICAL ONCE
OUTPATIENT
Start: 2025-05-19 | End: 2025-05-19

## 2025-05-19 RX ADMIN — LIDOCAINE HYDROCHLORIDE 10 ML: 40 SOLUTION TOPICAL at 13:15

## 2025-05-19 ASSESSMENT — PAIN SCALES - GENERAL: PAINLEVEL_OUTOF10: 0

## 2025-05-19 NOTE — PROGRESS NOTES
[phone]  MONDAY - THURSDAY 8:00 am - 4:30 pm and Friday 8:00 am - 12:00 pm.        NAME:  César Saunders  YOB: 1965  MEDICAL RECORD NUMBER:  [id number]  DATE:  5/19/2025          Return Appointment:  [x] Return Appointment: With Jose Angel Argueta CNP  in  2 Week(s)   [] Wound and dressing supply provider:   [x] ECF or Home Healthcare: [facility]   [] Wound Assessment: [] Physician or NP scheduled for Wound Assessment:   [] Orders placed during your visit:       Important Reminders:   Please wash hands with soap and water before and after every dressing change.  Do not scrub wounds.  Keep wounds dry in shower unless otherwise instructed by the physician.  SMOKING can slow would healing. Stop smoking as soon as possible to improve healing and prevent further complications associated with smoking.      Angelica-Wound Topical Treatments:  Do not apply lotions, creams, or ointments to wound bed unless directed.   [] Apply moisturizing lotion to skin surrounding the wound prior to dressing change.  [] Apply antifungal ointment to skin surrounding the wound prior to dressing change.  [x] Apply thin film of no sting moisture barrier ointment to skin immediately around  wound. (Skin Prep)  [] Other:     5/19/2025  Pressure Stage 4- Right Buttock 8 cm x 3.5 cm x 0.4 cm   Pressure Stage 4- Left Buttock  7 cm x 5.5 cm x 0.5 cm   Venous- Left Leg 5 cm x 5 cm x 0.1      Wound Location: RIGHT AND LEFT BUTTOCK WOUNDS    Wound Cleansing: Vashe Wash X 5 MINUTE SOAK AT WOUND CARE CENTER ONLY    **TRIAD TO SCROTUM ONLY - APPLY DAILY**    Primary Dressing:  []   [x]Silver Alginate    Secondary Dressing:  [x]Brief  []       Dressing Frequency:  [x] DAILY      Wound Location: LEFT LOWER LEG    Wound Cleansing: Vashe Wash X 5 MINUTE SOAK AT WOUND CARE CENTER ONLY    Primary Dressing:  []   [x]Silver Alginate    Secondary Dressing:  [x]Optilock  [x] Roll Gauze      Dressing Frequency:  [x] Every Other Day unless

## 2025-05-19 NOTE — PATIENT INSTRUCTIONS
[facility] Care Physician Orders and Discharge Instructions  [facility]  [address]  [place], [place] [address]  Telephone: [phone]      FAX [phone]  MONDAY - THURSDAY 8:00 am - 4:30 pm and Friday 8:00 am - 12:00 pm.        NAME:  César Saunders  YOB: 1965  MEDICAL RECORD NUMBER:  [id number]  DATE:  5/19/2025          Return Appointment:  [x] Return Appointment: With Jose Angel Argueta CNP  in  2 Week(s)   [] Wound and dressing supply provider:   [x] ECF or Home Healthcare: [facility]   [] Wound Assessment: [] Physician or NP scheduled for Wound Assessment:   [] Orders placed during your visit:       Important Reminders:   Please wash hands with soap and water before and after every dressing change.  Do not scrub wounds.  Keep wounds dry in shower unless otherwise instructed by the physician.  SMOKING can slow would healing. Stop smoking as soon as possible to improve healing and prevent further complications associated with smoking.      Angelica-Wound Topical Treatments:  Do not apply lotions, creams, or ointments to wound bed unless directed.   [] Apply moisturizing lotion to skin surrounding the wound prior to dressing change.  [] Apply antifungal ointment to skin surrounding the wound prior to dressing change.  [x] Apply thin film of no sting moisture barrier ointment to skin immediately around  wound. (Skin Prep)  [] Other:     5/19/2025  Pressure Stage 4- Right Buttock 8 cm x 3.5 cm x 0.4 cm   Pressure Stage 4- Left Buttock  7 cm x 5.5 cm x 0.5 cm   Venous- Left Leg 5 cm x 5 cm x 0.1      Wound Location: RIGHT AND LEFT BUTTOCK WOUNDS    Wound Cleansing: Vashe Wash X 5 MINUTE SOAK AT WOUND CARE CENTER ONLY    **TRIAD TO SCROTUM ONLY - APPLY DAILY**    Primary Dressing:  []   [x]Silver Alginate    Secondary Dressing:  [x]Brief  []       Dressing Frequency:  [x] DAILY      Wound Location: LEFT LOWER LEG    Wound Cleansing: Vashe Wash X 5

## 2025-05-20 PROBLEM — L97.929 VENOUS ULCER OF LEFT LEG (HCC): Status: ACTIVE | Noted: 2025-05-20

## 2025-05-20 PROBLEM — I83.029 VENOUS ULCER OF LEFT LEG (HCC): Status: ACTIVE | Noted: 2025-05-20

## 2025-06-02 ENCOUNTER — HOSPITAL ENCOUNTER (OUTPATIENT)
Dept: WOUND CARE | Age: 60
Discharge: HOME | End: 2025-06-02
Payer: MEDICAID

## 2025-06-02 VITALS
RESPIRATION RATE: 18 BRPM | TEMPERATURE: 97.5 F | DIASTOLIC BLOOD PRESSURE: 72 MMHG | HEART RATE: 78 BPM | SYSTOLIC BLOOD PRESSURE: 113 MMHG

## 2025-06-02 DIAGNOSIS — L97.929 VENOUS ULCER OF LEFT LEG (HCC): ICD-10-CM

## 2025-06-02 DIAGNOSIS — I83.029 VENOUS ULCER OF LEFT LEG (HCC): ICD-10-CM

## 2025-06-02 DIAGNOSIS — L89.314 PRESSURE ULCER OF RIGHT BUTTOCK, STAGE 4 (HCC): ICD-10-CM

## 2025-06-02 DIAGNOSIS — L89.324 PRESSURE ULCER OF LEFT BUTTOCK, STAGE 4 (HCC): Primary | ICD-10-CM

## 2025-06-02 PROCEDURE — 11042 DBRDMT SUBQ TIS 1ST 20SQCM/<: CPT | Performed by: NURSE PRACTITIONER

## 2025-06-02 PROCEDURE — 11045 DBRDMT SUBQ TISS EACH ADDL: CPT | Performed by: NURSE PRACTITIONER

## 2025-06-02 PROCEDURE — 11045 DBRDMT SUBQ TISS EACH ADDL: CPT

## 2025-06-02 PROCEDURE — 11042 DBRDMT SUBQ TIS 1ST 20SQCM/<: CPT

## 2025-06-02 RX ORDER — BACITRACIN ZINC AND POLYMYXIN B SULFATE 500; 1000 [USP'U]/G; [USP'U]/G
OINTMENT TOPICAL ONCE
OUTPATIENT
Start: 2025-06-02 | End: 2025-06-02

## 2025-06-02 RX ORDER — LIDOCAINE HYDROCHLORIDE 40 MG/ML
SOLUTION TOPICAL ONCE
Status: COMPLETED | OUTPATIENT
Start: 2025-06-02 | End: 2025-06-02

## 2025-06-02 RX ORDER — LIDOCAINE 50 MG/G
OINTMENT TOPICAL ONCE
OUTPATIENT
Start: 2025-06-02 | End: 2025-06-02

## 2025-06-02 RX ORDER — SILVER SULFADIAZINE 10 MG/G
CREAM TOPICAL ONCE
OUTPATIENT
Start: 2025-06-02 | End: 2025-06-02

## 2025-06-02 RX ORDER — GENTAMICIN SULFATE 1 MG/G
OINTMENT TOPICAL ONCE
OUTPATIENT
Start: 2025-06-02 | End: 2025-06-02

## 2025-06-02 RX ORDER — LIDOCAINE 40 MG/G
CREAM TOPICAL ONCE
OUTPATIENT
Start: 2025-06-02 | End: 2025-06-02

## 2025-06-02 RX ORDER — GINSENG 100 MG
CAPSULE ORAL ONCE
OUTPATIENT
Start: 2025-06-02 | End: 2025-06-02

## 2025-06-02 RX ORDER — MUPIROCIN 20 MG/G
OINTMENT TOPICAL ONCE
OUTPATIENT
Start: 2025-06-02 | End: 2025-06-02

## 2025-06-02 RX ORDER — LIDOCAINE HYDROCHLORIDE 40 MG/ML
SOLUTION TOPICAL ONCE
OUTPATIENT
Start: 2025-06-02 | End: 2025-06-02

## 2025-06-02 RX ORDER — CLOBETASOL PROPIONATE 0.5 MG/G
OINTMENT TOPICAL ONCE
OUTPATIENT
Start: 2025-06-02 | End: 2025-06-02

## 2025-06-02 RX ORDER — SODIUM CHLOR/HYPOCHLOROUS ACID 0.033 %
SOLUTION, IRRIGATION IRRIGATION ONCE
OUTPATIENT
Start: 2025-06-02 | End: 2025-06-02

## 2025-06-02 RX ORDER — LIDOCAINE HYDROCHLORIDE 20 MG/ML
JELLY TOPICAL ONCE
OUTPATIENT
Start: 2025-06-02 | End: 2025-06-02

## 2025-06-02 RX ORDER — TRIAMCINOLONE ACETONIDE 1 MG/G
OINTMENT TOPICAL ONCE
OUTPATIENT
Start: 2025-06-02 | End: 2025-06-02

## 2025-06-02 RX ORDER — NEOMYCIN/BACITRACIN/POLYMYXINB 3.5-400-5K
OINTMENT (GRAM) TOPICAL ONCE
OUTPATIENT
Start: 2025-06-02 | End: 2025-06-02

## 2025-06-02 RX ORDER — BETAMETHASONE DIPROPIONATE 0.5 MG/G
CREAM TOPICAL ONCE
OUTPATIENT
Start: 2025-06-02 | End: 2025-06-02

## 2025-06-02 RX ADMIN — LIDOCAINE HYDROCHLORIDE: 40 SOLUTION TOPICAL at 13:33

## 2025-06-02 NOTE — PROGRESS NOTES
[facility]     Note Type: Medical Staff Progress Note    Referring Provider: Self Referral  Reason for Referral: Bilateral buttock wounds    César Saunders  MEDICAL RECORD NUMBER:  [id number]  AGE: 60 y.o.   GENDER: male  : 1965  EPISODE DATE:  2025    Chief complaint and reason for visit:     Chief Complaint   Patient presents with    Wound Check     Follow-up visit for wounds to the right and left buttocks and left lower leg.       HPI/Wound Narrative:      César Saunders is a 60 y.o. male who presents today for an evaluation of a wound/ulcer. Wound duration:  2-3 year(s).  Patient presents for returning wound care visit for bilateral chronic gluteal wounds.  Patient resides at [facility] on the medical unit for assistance with dressing changes.  Patient paraplegic and uses a motorized scooter with a high level seating cushion.  History of diabetes, hypertension, obesity, and chronic pressure injuries.  Current treatment: unknown.  He denies pain, fever, chills and/or nausea/vomiting    Wound/Ulcer Pain Timing/Severity: none  Quality of pain: N/A  Severity:  0 / 10   Modifying Factors: None  Associated Signs/Symptoms: none    2025:  New venous ulcer to left anterior lower leg.  Patient has not been wearing ACE wraps as directed. Buttock wounds appear stable, maceration to scrotum.    2025: At each visit x 3 weeks the patient has arrived with incorrect dressing applied to wound from nursing facility.   at wound care center will attempt to have wound care nurse at [facility] wound care [place] for care coordination.  Patient's remain stable with little progression likely related to improper dressings being applied to the wound.  No overt signs of infection or constitutional issues noted today.  Alginate with silver daily.  Follow-up in 1 week.    2025: This week incorrect dressing applied-Hydrofera Blue

## 2025-06-02 NOTE — PATIENT INSTRUCTIONS
[facility] Wound Care Physician Orders and Discharge Instructions  [facility]  [address]  [place], [place] [address]  Telephone: [phone]      FAX [phone]  MONDAY - THURSDAY 8:00 am - 4:30 pm and Friday 8:00 am - 12:00 pm.        NAME:  César Saunders  YOB: 1965  MEDICAL RECORD NUMBER:  [id number]  DATE:  6/2/2025          Return Appointment:  [x] Return Appointment: With Jose Angel Argueta CNP  in  2 Week(s)   [] Wound and dressing supply provider:   [x] ECF or Home Healthcare: [facility]   [] Wound Assessment: [] Physician or NP scheduled for Wound Assessment:   [] Orders placed during your visit:       Important Reminders:   Please wash hands with soap and water before and after every dressing change.  Do not scrub wounds.  Keep wounds dry in shower unless otherwise instructed by the physician.  SMOKING can slow would healing. Stop smoking as soon as possible to improve healing and prevent further complications associated with smoking.      Angelica-Wound Topical Treatments:  Do not apply lotions, creams, or ointments to wound bed unless directed.   [] Apply moisturizing lotion to skin surrounding the wound prior to dressing change.  [] Apply antifungal ointment to skin surrounding the wound prior to dressing change.  [x] Apply thin film of no sting moisture barrier ointment to skin immediately around  wound. (Skin Prep)  [] Other:     6/2/2025  Pressure Stage 4- Right Buttock 7.2 cm x 3.1 cm x 0.3 cm   Pressure Stage 4- Left Buttock  4.3 cm x 4.5 cm x 0.3 cm   Venous- Left Leg 4.5 cm x 2.5 cm x 0.1cm      Wound Location: RIGHT AND LEFT BUTTOCK WOUNDS    Wound Cleansing: Vashe Wash X 5 MINUTE SOAK AT WOUND CARE CENTER ONLY    **TRIAD TO SCROTUM ONLY - APPLY DAILY**    Primary Dressing:  []   [x]Silver Alginate    Secondary Dressing:  [x]Brief  []       Dressing Frequency:  [x] DAILY      Wound Location: LEFT LOWER LEG    Wound Cleansing: Vashe Wash

## 2025-06-09 ENCOUNTER — TELEPHONE (OUTPATIENT)
Dept: WOUND CARE | Age: 60
End: 2025-06-09

## 2025-06-09 DIAGNOSIS — L89.324 PRESSURE ULCER OF LEFT BUTTOCK, STAGE 4 (HCC): ICD-10-CM

## 2025-06-09 DIAGNOSIS — L97.929 VENOUS ULCER OF LEFT LEG (HCC): ICD-10-CM

## 2025-06-09 DIAGNOSIS — L89.314 PRESSURE ULCER OF RIGHT BUTTOCK, STAGE 4 (HCC): Primary | ICD-10-CM

## 2025-06-09 DIAGNOSIS — I83.029 VENOUS ULCER OF LEFT LEG (HCC): ICD-10-CM

## 2025-06-09 NOTE — TELEPHONE ENCOUNTER
6/2/2025 Patient was here on 6/2/2025 for wound care on buttock wounds. This nurse called the wound care nurse and D.O.N on 6/2/2025 at [facility]. Wound care nurses voicemail was full and unable to leave message and D.O.N, a message was left to return my call.     6/6/2025 Patient called to inform this nurse that he has yet to receive the proper treatment for his wounds due to not having the Alginate AG available. This nurse encouraged patient to speak to administration, within his facility, and if needs are not addressed to call state.     6/9/25 No call back from facility.

## 2025-06-23 ENCOUNTER — HOSPITAL ENCOUNTER (OUTPATIENT)
Dept: WOUND CARE | Age: 60
Discharge: HOME | End: 2025-06-23
Payer: MEDICAID

## 2025-06-23 VITALS
SYSTOLIC BLOOD PRESSURE: 146 MMHG | RESPIRATION RATE: 18 BRPM | HEART RATE: 75 BPM | TEMPERATURE: 97.5 F | DIASTOLIC BLOOD PRESSURE: 78 MMHG

## 2025-06-23 DIAGNOSIS — I83.892 VENOUS STASIS ULCER OF LEFT LOWER LEG WITH EDEMA OF LEFT LOWER LEG (HCC): ICD-10-CM

## 2025-06-23 DIAGNOSIS — I83.891 VENOUS STASIS ULCER OF RIGHT LOWER LEG WITH EDEMA OF RIGHT LOWER LEG (HCC): ICD-10-CM

## 2025-06-23 DIAGNOSIS — R60.0 VENOUS STASIS ULCER OF RIGHT LOWER LEG WITH EDEMA OF RIGHT LOWER LEG (HCC): ICD-10-CM

## 2025-06-23 DIAGNOSIS — E66.812 CLASS 2 SEVERE OBESITY DUE TO EXCESS CALORIES WITH SERIOUS COMORBIDITY AND BODY MASS INDEX (BMI) OF 35.0 TO 35.9 IN ADULT (HCC): ICD-10-CM

## 2025-06-23 DIAGNOSIS — I83.019 VENOUS STASIS ULCER OF RIGHT LOWER LEG WITH EDEMA OF RIGHT LOWER LEG (HCC): ICD-10-CM

## 2025-06-23 DIAGNOSIS — L03.317 CELLULITIS OF GLUTEAL REGION: ICD-10-CM

## 2025-06-23 DIAGNOSIS — L89.324 PRESSURE ULCER OF LEFT BUTTOCK, STAGE 4 (HCC): Primary | ICD-10-CM

## 2025-06-23 DIAGNOSIS — L25.8 DERMATITIS ASSOCIATED WITH MOISTURE FROM STOOL INCONTINENCE: ICD-10-CM

## 2025-06-23 DIAGNOSIS — E66.01 CLASS 2 SEVERE OBESITY DUE TO EXCESS CALORIES WITH SERIOUS COMORBIDITY AND BODY MASS INDEX (BMI) OF 35.0 TO 35.9 IN ADULT (HCC): ICD-10-CM

## 2025-06-23 DIAGNOSIS — R60.0 VENOUS STASIS ULCER OF LEFT LOWER LEG WITH EDEMA OF LEFT LOWER LEG (HCC): ICD-10-CM

## 2025-06-23 DIAGNOSIS — L97.919 VENOUS STASIS ULCER OF RIGHT LOWER LEG WITH EDEMA OF RIGHT LOWER LEG (HCC): ICD-10-CM

## 2025-06-23 DIAGNOSIS — L08.9 WOUND INFECTION: ICD-10-CM

## 2025-06-23 DIAGNOSIS — T14.8XXA WOUND INFECTION: ICD-10-CM

## 2025-06-23 DIAGNOSIS — L89.314 PRESSURE ULCER OF RIGHT BUTTOCK, STAGE 4 (HCC): ICD-10-CM

## 2025-06-23 DIAGNOSIS — I83.029 VENOUS STASIS ULCER OF LEFT LOWER LEG WITH EDEMA OF LEFT LOWER LEG (HCC): ICD-10-CM

## 2025-06-23 DIAGNOSIS — L97.929 VENOUS STASIS ULCER OF LEFT LOWER LEG WITH EDEMA OF LEFT LOWER LEG (HCC): ICD-10-CM

## 2025-06-23 DIAGNOSIS — R15.9 DERMATITIS ASSOCIATED WITH MOISTURE FROM STOOL INCONTINENCE: ICD-10-CM

## 2025-06-23 PROCEDURE — 87186 SC STD MICRODIL/AGAR DIL: CPT

## 2025-06-23 PROCEDURE — 87070 CULTURE OTHR SPECIMN AEROBIC: CPT

## 2025-06-23 PROCEDURE — 87077 CULTURE AEROBIC IDENTIFY: CPT

## 2025-06-23 PROCEDURE — 11045 DBRDMT SUBQ TISS EACH ADDL: CPT

## 2025-06-23 PROCEDURE — 11046 DBRDMT MUSC&/FSCA EA ADDL: CPT

## 2025-06-23 PROCEDURE — 87205 SMEAR GRAM STAIN: CPT

## 2025-06-23 PROCEDURE — 11042 DBRDMT SUBQ TIS 1ST 20SQCM/<: CPT

## 2025-06-23 PROCEDURE — 11043 DBRDMT MUSC&/FSCA 1ST 20/<: CPT

## 2025-06-23 PROCEDURE — 29580 STRAPPING UNNA BOOT: CPT

## 2025-06-23 RX ORDER — BACITRACIN ZINC AND POLYMYXIN B SULFATE 500; 1000 [USP'U]/G; [USP'U]/G
OINTMENT TOPICAL ONCE
OUTPATIENT
Start: 2025-06-23 | End: 2025-06-23

## 2025-06-23 RX ORDER — CLOBETASOL PROPIONATE 0.5 MG/G
OINTMENT TOPICAL ONCE
OUTPATIENT
Start: 2025-06-23 | End: 2025-06-23

## 2025-06-23 RX ORDER — MUPIROCIN 2 %
OINTMENT (GRAM) TOPICAL ONCE
OUTPATIENT
Start: 2025-06-23 | End: 2025-06-23

## 2025-06-23 RX ORDER — LIDOCAINE HYDROCHLORIDE 20 MG/ML
JELLY TOPICAL ONCE
OUTPATIENT
Start: 2025-06-23 | End: 2025-06-23

## 2025-06-23 RX ORDER — NEOMYCIN/BACITRACIN/POLYMYXINB 3.5-400-5K
OINTMENT (GRAM) TOPICAL ONCE
OUTPATIENT
Start: 2025-06-23 | End: 2025-06-23

## 2025-06-23 RX ORDER — LIDOCAINE HYDROCHLORIDE 40 MG/ML
SOLUTION TOPICAL ONCE
Status: COMPLETED | OUTPATIENT
Start: 2025-06-23 | End: 2025-06-23

## 2025-06-23 RX ORDER — SILVER SULFADIAZINE 10 MG/G
CREAM TOPICAL ONCE
OUTPATIENT
Start: 2025-06-23 | End: 2025-06-23

## 2025-06-23 RX ORDER — LIDOCAINE HYDROCHLORIDE 40 MG/ML
SOLUTION TOPICAL ONCE
OUTPATIENT
Start: 2025-06-23 | End: 2025-06-23

## 2025-06-23 RX ORDER — SODIUM CHLOR/HYPOCHLOROUS ACID 0.033 %
SOLUTION, IRRIGATION IRRIGATION ONCE
OUTPATIENT
Start: 2025-06-23 | End: 2025-06-23

## 2025-06-23 RX ORDER — TRIAMCINOLONE ACETONIDE 1 MG/G
OINTMENT TOPICAL ONCE
OUTPATIENT
Start: 2025-06-23 | End: 2025-06-23

## 2025-06-23 RX ORDER — BETAMETHASONE DIPROPIONATE 0.5 MG/G
CREAM TOPICAL ONCE
OUTPATIENT
Start: 2025-06-23 | End: 2025-06-23

## 2025-06-23 RX ORDER — LIDOCAINE 50 MG/G
OINTMENT TOPICAL ONCE
OUTPATIENT
Start: 2025-06-23 | End: 2025-06-23

## 2025-06-23 RX ORDER — GINSENG 100 MG
CAPSULE ORAL ONCE
OUTPATIENT
Start: 2025-06-23 | End: 2025-06-23

## 2025-06-23 RX ORDER — GENTAMICIN SULFATE 1 MG/G
OINTMENT TOPICAL ONCE
OUTPATIENT
Start: 2025-06-23 | End: 2025-06-23

## 2025-06-23 RX ORDER — LIDOCAINE 40 MG/G
CREAM TOPICAL ONCE
OUTPATIENT
Start: 2025-06-23 | End: 2025-06-23

## 2025-06-23 RX ORDER — SULFAMETHOXAZOLE AND TRIMETHOPRIM 800; 160 MG/1; MG/1
1 TABLET ORAL 2 TIMES DAILY
Qty: 28 TABLET | Refills: 0 | Status: SHIPPED | OUTPATIENT
Start: 2025-06-23 | End: 2025-07-07

## 2025-06-23 RX ADMIN — LIDOCAINE HYDROCHLORIDE: 40 SOLUTION TOPICAL at 13:23

## 2025-06-23 ASSESSMENT — PAIN SCALES - GENERAL: PAINLEVEL_OUTOF10: 0

## 2025-06-23 NOTE — PATIENT INSTRUCTIONS
[facility] Wound Care Physician Orders and Discharge Instructions  [facility]  [address], [address]  [place], [place] [address]  Telephone: [phone]      FAX [phone]  MONDAY - THURSDAY 8:00 am - 4:30 pm and Friday 8:00 am - 12:00 pm.        NAME:  César Saunders  YOB: 1965  MEDICAL RECORD NUMBER:  [id number]  DATE:  6/23/2025      Return Appointment:  [x] Return Appointment: With Jose Angel Argueta CNP  in  1 Week(s)   [] Wound and dressing supply provider:   [x] ECF or Home Healthcare: [facility]  [] Wound Assessment: [] Physician or NP scheduled for Wound Assessment:   [x] Orders placed during your visit: Wound Culture-Pending    BEGIN TAKING BACTRIM DS 2X/DAILY FOR 14 DAYS     Important Reminders:   Please wash hands with soap and water before and after every dressing change.  Do not scrub wounds.  Keep wounds dry in shower unless otherwise instructed by the physician.  SMOKING can slow would healing. Stop smoking as soon as possible to improve healing and prevent further complications associated with smoking.      Angelica-Wound Topical Treatments:  Do not apply lotions, creams, or ointments to wound bed unless directed.   [] Apply moisturizing lotion to skin surrounding the wound prior to dressing change.  [] Apply antifungal ointment to skin surrounding the wound prior to dressing change.  [x] Apply thin film of no sting moisture barrier ointment to skin immediately around  wound. (Skin Prep)  [] Other:     6/23/2025  Pressure Stage 4- Right Buttock 7 cm x 2.5 cm x 0.3 cm   Pressure Stage 4- Left Buttock  5 cm x 5 cm x 0.2 cm   Venous- Left Leg 10 cm x 10 cm x 0.1cm      Wound Location: RIGHT AND LEFT BUTTOCK WOUNDS    Wound Cleansing: Vashe Wash X 5 MINUTE SOAK AT WOUND CARE CENTER ONLY    **TRIAD TO SCROTUM ONLY - APPLY DAILY**    Primary Dressing:  []   [x]Silver Alginate    Secondary Dressing:  [x]Brief  []       Dressing Frequency:  [x]

## 2025-06-23 NOTE — PROGRESS NOTES
[facility]     Note Type: Medical Staff Progress Note    Referring Provider: Self Referral  Reason for Referral: Bilateral buttock wounds    César Saunders  MEDICAL RECORD NUMBER:  [id number]  AGE: 60 y.o.   GENDER: male  : 1965  EPISODE DATE:  2025    Chief complaint and reason for visit:     Chief Complaint   Patient presents with    Wound Check     Follow up for left lower leg wound.       HPI/Wound Narrative:      César Saunders is a 60 y.o. male who presents today for an evaluation of a wound/ulcer. Wound duration: 2-3 year(s).  Patient presents for returning wound care visit for bilateral chronic gluteal wounds.  Patient resides at [facility] on the medical unit for assistance with dressing changes.  Patient paraplegic and uses a motorized scooter with a high level seating cushion.  History of diabetes, hypertension, obesity, and chronic pressure injuries.  Current treatment: unknown.  He denies pain, fever, chills and/or nausea/vomiting    Wound/Ulcer Pain Timing/Severity: none  Quality of pain: N/A  Severity:  0 / 10   Modifying Factors: None  Associated Signs/Symptoms: none    2025: New wounds noted to bilateral lower extremities related to nonpitting edema and suspected lymphedema.  Discussed goals of care with patient at length-patient refusing diverting loop colostomy to promote right and left buttock healing and negative wound pressure therapy to areas on buttock.  Suspected wound infection with cellulitis to gluteal areas noted.  Prescription for Bactrim DS x 14 days provided to patient to get prescription filled at [facility].  Patient will also benefit from diuresis. Continue current treatment.  Follow-up in 1 week.    2025:  New venous ulcer to left anterior lower leg.  Patient has not been wearing ACE wraps as directed. Buttock wounds appear stable, maceration to scrotum.    2025: At each visit

## 2025-06-23 NOTE — PLAN OF CARE
Unna Boot Application   Below Knee    NAME:  César Saunders  YOB: 1965  MEDICAL RECORD NUMBER:  [id number]  DATE:  6/23/2025    Unna boot: Appied primary and secondary dressing as ordered.  Applied Unna roll from toes to knee overlapping each time.   Applied ace wrap or coban from toes to below the knee.   Secured with tape and/or metal clips covered with tape.   Instructed patient/caregiver to keep dressing dry and intact. DO NOT REMOVE DRESSING.   Instructed pt/family/caregiver to report excessive draining, loose bandage, wet dressing, severe pain or tingling in toes.  Applied Unna Boot dressing below the knee to right lower leg.  Applied Unna Boot dressing below the knee to left lower leg.    Unna Boot(s) were applied per  Guidelines.     Electronically signed by Karina Fleming RN on 6/23/2025 at 4:28 PM

## 2025-06-24 PROBLEM — I83.891 VENOUS STASIS ULCER OF RIGHT LOWER LEG WITH EDEMA OF RIGHT LOWER LEG (HCC): Status: ACTIVE | Noted: 2025-06-24

## 2025-06-24 PROBLEM — I83.019 VENOUS STASIS ULCER OF RIGHT LOWER LEG WITH EDEMA OF RIGHT LOWER LEG (HCC): Status: ACTIVE | Noted: 2025-06-24

## 2025-06-24 PROBLEM — R60.0 VENOUS STASIS ULCER OF RIGHT LOWER LEG WITH EDEMA OF RIGHT LOWER LEG (HCC): Status: ACTIVE | Noted: 2025-06-24

## 2025-06-24 PROBLEM — L03.317 CELLULITIS OF GLUTEAL REGION: Status: ACTIVE | Noted: 2025-06-24

## 2025-06-24 PROBLEM — L97.919 VENOUS STASIS ULCER OF RIGHT LOWER LEG WITH EDEMA OF RIGHT LOWER LEG (HCC): Status: ACTIVE | Noted: 2025-06-24

## 2025-06-27 LAB
BACTERIA SPEC AEROBE CULT: ABNORMAL
GRAM STN SPEC: ABNORMAL
ORGANISM: ABNORMAL

## 2025-06-30 ENCOUNTER — HOSPITAL ENCOUNTER (OUTPATIENT)
Dept: WOUND CARE | Age: 60
Discharge: HOME | End: 2025-06-30
Payer: MEDICAID

## 2025-06-30 VITALS
SYSTOLIC BLOOD PRESSURE: 131 MMHG | RESPIRATION RATE: 18 BRPM | HEART RATE: 76 BPM | DIASTOLIC BLOOD PRESSURE: 96 MMHG | TEMPERATURE: 97.3 F

## 2025-06-30 DIAGNOSIS — I83.029 VENOUS ULCER OF LEFT LEG (HCC): ICD-10-CM

## 2025-06-30 DIAGNOSIS — L97.919 VENOUS STASIS ULCER OF RIGHT LOWER LEG WITH EDEMA OF RIGHT LOWER LEG (HCC): ICD-10-CM

## 2025-06-30 DIAGNOSIS — R60.0 VENOUS STASIS ULCER OF LEFT LOWER LEG WITH EDEMA OF LEFT LOWER LEG (HCC): ICD-10-CM

## 2025-06-30 DIAGNOSIS — L89.314 PRESSURE ULCER OF RIGHT BUTTOCK, STAGE 4 (HCC): ICD-10-CM

## 2025-06-30 DIAGNOSIS — R60.0 VENOUS STASIS ULCER OF RIGHT LOWER LEG WITH EDEMA OF RIGHT LOWER LEG (HCC): ICD-10-CM

## 2025-06-30 DIAGNOSIS — L97.929 VENOUS ULCER OF LEFT LEG (HCC): ICD-10-CM

## 2025-06-30 DIAGNOSIS — L03.317 CELLULITIS OF GLUTEAL REGION: ICD-10-CM

## 2025-06-30 DIAGNOSIS — L97.929 VENOUS STASIS ULCER OF LEFT LOWER LEG WITH EDEMA OF LEFT LOWER LEG (HCC): ICD-10-CM

## 2025-06-30 DIAGNOSIS — I83.019 VENOUS STASIS ULCER OF RIGHT LOWER LEG WITH EDEMA OF RIGHT LOWER LEG (HCC): ICD-10-CM

## 2025-06-30 DIAGNOSIS — I83.029 VENOUS STASIS ULCER OF LEFT LOWER LEG WITH EDEMA OF LEFT LOWER LEG (HCC): ICD-10-CM

## 2025-06-30 DIAGNOSIS — I83.891 VENOUS STASIS ULCER OF RIGHT LOWER LEG WITH EDEMA OF RIGHT LOWER LEG (HCC): ICD-10-CM

## 2025-06-30 DIAGNOSIS — I83.892 VENOUS STASIS ULCER OF LEFT LOWER LEG WITH EDEMA OF LEFT LOWER LEG (HCC): ICD-10-CM

## 2025-06-30 DIAGNOSIS — L89.324 PRESSURE ULCER OF LEFT BUTTOCK, STAGE 4 (HCC): Primary | ICD-10-CM

## 2025-06-30 PROCEDURE — 11046 DBRDMT MUSC&/FSCA EA ADDL: CPT

## 2025-06-30 PROCEDURE — 11043 DBRDMT MUSC&/FSCA 1ST 20/<: CPT

## 2025-06-30 RX ORDER — SODIUM CHLOR/HYPOCHLOROUS ACID 0.033 %
SOLUTION, IRRIGATION IRRIGATION ONCE
OUTPATIENT
Start: 2025-06-30 | End: 2025-06-30

## 2025-06-30 RX ORDER — SILVER SULFADIAZINE 10 MG/G
CREAM TOPICAL ONCE
OUTPATIENT
Start: 2025-06-30 | End: 2025-06-30

## 2025-06-30 RX ORDER — LIDOCAINE HYDROCHLORIDE 20 MG/ML
JELLY TOPICAL ONCE
OUTPATIENT
Start: 2025-06-30 | End: 2025-06-30

## 2025-06-30 RX ORDER — LIDOCAINE 40 MG/G
CREAM TOPICAL ONCE
OUTPATIENT
Start: 2025-06-30 | End: 2025-06-30

## 2025-06-30 RX ORDER — BETAMETHASONE DIPROPIONATE 0.5 MG/G
CREAM TOPICAL ONCE
OUTPATIENT
Start: 2025-06-30 | End: 2025-06-30

## 2025-06-30 RX ORDER — TRIAMCINOLONE ACETONIDE 1 MG/G
OINTMENT TOPICAL ONCE
OUTPATIENT
Start: 2025-06-30 | End: 2025-06-30

## 2025-06-30 RX ORDER — LIDOCAINE HYDROCHLORIDE 40 MG/ML
SOLUTION TOPICAL ONCE
Status: COMPLETED | OUTPATIENT
Start: 2025-06-30 | End: 2025-06-30

## 2025-06-30 RX ORDER — GINSENG 100 MG
CAPSULE ORAL ONCE
OUTPATIENT
Start: 2025-06-30 | End: 2025-06-30

## 2025-06-30 RX ORDER — CLOBETASOL PROPIONATE 0.5 MG/G
OINTMENT TOPICAL ONCE
OUTPATIENT
Start: 2025-06-30 | End: 2025-06-30

## 2025-06-30 RX ORDER — GENTAMICIN SULFATE 1 MG/G
OINTMENT TOPICAL ONCE
OUTPATIENT
Start: 2025-06-30 | End: 2025-06-30

## 2025-06-30 RX ORDER — LIDOCAINE 50 MG/G
OINTMENT TOPICAL ONCE
OUTPATIENT
Start: 2025-06-30 | End: 2025-06-30

## 2025-06-30 RX ORDER — MUPIROCIN 2 %
OINTMENT (GRAM) TOPICAL ONCE
OUTPATIENT
Start: 2025-06-30 | End: 2025-06-30

## 2025-06-30 RX ORDER — NEOMYCIN/BACITRACIN/POLYMYXINB 3.5-400-5K
OINTMENT (GRAM) TOPICAL ONCE
OUTPATIENT
Start: 2025-06-30 | End: 2025-06-30

## 2025-06-30 RX ORDER — LIDOCAINE HYDROCHLORIDE 40 MG/ML
SOLUTION TOPICAL ONCE
OUTPATIENT
Start: 2025-06-30 | End: 2025-06-30

## 2025-06-30 RX ORDER — BACITRACIN ZINC AND POLYMYXIN B SULFATE 500; 1000 [USP'U]/G; [USP'U]/G
OINTMENT TOPICAL ONCE
OUTPATIENT
Start: 2025-06-30 | End: 2025-06-30

## 2025-06-30 RX ADMIN — LIDOCAINE HYDROCHLORIDE: 40 SOLUTION TOPICAL at 13:44

## 2025-06-30 NOTE — PATIENT INSTRUCTIONS
[facility] Wound Care Physician Orders and Discharge Instructions  [facility]  [address], [address]  [place], [place] [address]  Telephone: [phone]      FAX [phone]  MONDAY - THURSDAY 8:00 am - 4:30 pm and Friday 8:00 am - 12:00 pm.        NAME:  César Saunders  YOB: 1965  MEDICAL RECORD NUMBER:  [id number]  DATE:  6/30/2025      Return Appointment:  [x] Return Appointment: With Jose Angel Argueta CNP  in  1 Week(s)   [] Wound and dressing supply provider:   [x] ECF or Home Healthcare: [facility]  [] Wound Assessment: [] Physician or NP scheduled for Wound Assessment:   [x] Orders placed during your visit: Wound Culture-Pending    BEGIN TAKING BACTRIM DS 2X/DAILY FOR 14 DAYS     Important Reminders:   Please wash hands with soap and water before and after every dressing change.  Do not scrub wounds.  Keep wounds dry in shower unless otherwise instructed by the physician.  SMOKING can slow would healing. Stop smoking as soon as possible to improve healing and prevent further complications associated with smoking.      Angelica-Wound Topical Treatments:  Do not apply lotions, creams, or ointments to wound bed unless directed.   [] Apply moisturizing lotion to skin surrounding the wound prior to dressing change.  [] Apply antifungal ointment to skin surrounding the wound prior to dressing change.  [x] Apply thin film of no sting moisture barrier ointment to skin immediately around  wound. (Skin Prep)  [] Other:     6/30/2025  Pressure Stage 4- Right Buttock 6.5 cm x 2 cm x 0.3 cm   Pressure Stage 4- Left Buttock  3 cm x 4 cm x 0.2 cm   Venous- Left Leg 0.6 cm x 2.5 cm x 0.1cm      Wound Location: RIGHT AND LEFT BUTTOCK WOUNDS    Wound Cleansing: Vashe Wash X 5 MINUTE SOAK AT WOUND CARE CENTER ONLY    **TRIAD TO SCROTUM ONLY - APPLY DAILY**    Primary Dressing:  []   [x]Silver Alginate    Secondary Dressing:  [x]Brief  []       Dressing Frequency:  [x]

## 2025-06-30 NOTE — PLAN OF CARE
Unna Boot Application   Below Knee    NAME:  César Saunders  YOB: 1965  MEDICAL RECORD NUMBER:  [id number]  DATE:  6/30/2025    Unna boot: Applied moisturizing agent to dry skin as needed.   Appied primary and secondary dressing as ordered.  Applied Unna roll from toes to knee overlapping each time.   Applied ace wrap or coban from toes to below the knee.   Secured with tape and/or metal clips covered with tape.   Instructed patient/caregiver to keep dressing dry and intact. DO NOT REMOVE DRESSING.   Instructed pt/family/caregiver to report excessive draining, loose bandage, wet dressing, severe pain or tingling in toes.  Applied Unna Boot dressing below the knee to right lower leg.  Applied Unna Boot dressing below the knee to left lower leg.    Unna Boot(s) were applied per  Guidelines.     Electronically signed by Laura Garner RN on 6/30/2025 at 4:43 PM

## 2025-06-30 NOTE — PROGRESS NOTES
[facility]     Note Type: Medical Staff Progress Note    Referring Provider: Self Referral  Reason for Referral: Bilateral buttock wounds    César Saunders  MEDICAL RECORD NUMBER:  [id number]  AGE: 60 y.o.   GENDER: male  : 1965  EPISODE DATE:  2025    Chief complaint and reason for visit:     Chief Complaint   Patient presents with    Wound Check     Follow-up visit for wounds to the buttocks and legs.       HPI/Wound Narrative:      César Saunders is a 60 y.o. male who presents today for an evaluation of a wound/ulcer. Wound duration: 2-3 year(s).  Patient presents for returning wound care visit for bilateral chronic gluteal wounds.  Patient resides at [facility] on the medical unit for assistance with dressing changes.  Patient paraplegic and uses a motorized scooter with a high level seating cushion.  History of diabetes, hypertension, obesity, and chronic pressure injuries.  Current treatment: unknown.  He denies pain, fever, chills and/or nausea/vomiting    Wound/Ulcer Pain Timing/Severity: none  Quality of pain: N/A  Severity:  0 / 10   Modifying Factors: None  Associated Signs/Symptoms: none    2025: Right lower leg wound healed.  No issues or concerns reported.  Patient remains on oral Bactrim DS for wound infection results reviewed-findings positive for Proteus Mirabilis, E. coli, staph RES MRSA light growth.  Will continue current treatment.  Follow-up in 1 week.    2025: New wounds noted to bilateral lower extremities related to nonpitting edema and suspected lymphedema.  Discussed goals of care with patient at length-patient refusing diverting loop colostomy to promote right and left buttock healing and negative wound pressure therapy to areas on buttock.  Suspected wound infection with cellulitis to gluteal areas noted.  Prescription for Bactrim DS x 14 days provided to patient to get prescription filled at [place]

## 2025-07-07 ENCOUNTER — HOSPITAL ENCOUNTER (OUTPATIENT)
Dept: WOUND CARE | Age: 60
Discharge: HOME | End: 2025-07-07
Payer: MEDICAID

## 2025-07-07 VITALS
HEART RATE: 74 BPM | SYSTOLIC BLOOD PRESSURE: 144 MMHG | TEMPERATURE: 97.3 F | DIASTOLIC BLOOD PRESSURE: 77 MMHG | RESPIRATION RATE: 18 BRPM

## 2025-07-07 DIAGNOSIS — E66.812 CLASS 2 SEVERE OBESITY DUE TO EXCESS CALORIES WITH SERIOUS COMORBIDITY AND BODY MASS INDEX (BMI) OF 35.0 TO 35.9 IN ADULT (HCC): ICD-10-CM

## 2025-07-07 DIAGNOSIS — L08.9 WOUND INFECTION: ICD-10-CM

## 2025-07-07 DIAGNOSIS — E66.01 CLASS 2 SEVERE OBESITY DUE TO EXCESS CALORIES WITH SERIOUS COMORBIDITY AND BODY MASS INDEX (BMI) OF 35.0 TO 35.9 IN ADULT (HCC): ICD-10-CM

## 2025-07-07 DIAGNOSIS — I83.029 VENOUS ULCER OF LEFT LEG (HCC): ICD-10-CM

## 2025-07-07 DIAGNOSIS — L97.929 VENOUS ULCER OF LEFT LEG (HCC): ICD-10-CM

## 2025-07-07 DIAGNOSIS — L97.929 VENOUS STASIS ULCER OF LEFT LOWER LEG WITH EDEMA OF LEFT LOWER LEG (HCC): ICD-10-CM

## 2025-07-07 DIAGNOSIS — I83.019 VENOUS STASIS ULCER OF RIGHT LOWER LEG WITH EDEMA OF RIGHT LOWER LEG (HCC): ICD-10-CM

## 2025-07-07 DIAGNOSIS — L89.324 PRESSURE ULCER OF LEFT BUTTOCK, STAGE 4 (HCC): Primary | ICD-10-CM

## 2025-07-07 DIAGNOSIS — I83.029 VENOUS STASIS ULCER OF LEFT LOWER LEG WITH EDEMA OF LEFT LOWER LEG (HCC): ICD-10-CM

## 2025-07-07 DIAGNOSIS — I83.892 VENOUS STASIS ULCER OF LEFT LOWER LEG WITH EDEMA OF LEFT LOWER LEG (HCC): ICD-10-CM

## 2025-07-07 DIAGNOSIS — I83.891 VENOUS STASIS ULCER OF RIGHT LOWER LEG WITH EDEMA OF RIGHT LOWER LEG (HCC): ICD-10-CM

## 2025-07-07 DIAGNOSIS — R60.0 VENOUS STASIS ULCER OF LEFT LOWER LEG WITH EDEMA OF LEFT LOWER LEG (HCC): ICD-10-CM

## 2025-07-07 DIAGNOSIS — L97.919 VENOUS STASIS ULCER OF RIGHT LOWER LEG WITH EDEMA OF RIGHT LOWER LEG (HCC): ICD-10-CM

## 2025-07-07 DIAGNOSIS — R15.9 DERMATITIS ASSOCIATED WITH MOISTURE FROM STOOL INCONTINENCE: ICD-10-CM

## 2025-07-07 DIAGNOSIS — L89.314 PRESSURE ULCER OF RIGHT BUTTOCK, STAGE 4 (HCC): ICD-10-CM

## 2025-07-07 DIAGNOSIS — L25.8 DERMATITIS ASSOCIATED WITH MOISTURE FROM STOOL INCONTINENCE: ICD-10-CM

## 2025-07-07 DIAGNOSIS — R60.0 VENOUS STASIS ULCER OF RIGHT LOWER LEG WITH EDEMA OF RIGHT LOWER LEG (HCC): ICD-10-CM

## 2025-07-07 DIAGNOSIS — T14.8XXA WOUND INFECTION: ICD-10-CM

## 2025-07-07 DIAGNOSIS — L03.317 CELLULITIS OF GLUTEAL REGION: ICD-10-CM

## 2025-07-07 PROCEDURE — 11043 DBRDMT MUSC&/FSCA 1ST 20/<: CPT

## 2025-07-07 PROCEDURE — 29581 APPL MULTLAYER CMPRN SYS LEG: CPT

## 2025-07-07 RX ORDER — GENTAMICIN SULFATE 1 MG/G
OINTMENT TOPICAL ONCE
OUTPATIENT
Start: 2025-07-07 | End: 2025-07-07

## 2025-07-07 RX ORDER — CLOBETASOL PROPIONATE 0.5 MG/G
OINTMENT TOPICAL ONCE
OUTPATIENT
Start: 2025-07-07 | End: 2025-07-07

## 2025-07-07 RX ORDER — MUPIROCIN 2 %
OINTMENT (GRAM) TOPICAL ONCE
OUTPATIENT
Start: 2025-07-07 | End: 2025-07-07

## 2025-07-07 RX ORDER — BETAMETHASONE DIPROPIONATE 0.5 MG/G
CREAM TOPICAL ONCE
OUTPATIENT
Start: 2025-07-07 | End: 2025-07-07

## 2025-07-07 RX ORDER — SODIUM CHLOR/HYPOCHLOROUS ACID 0.033 %
SOLUTION, IRRIGATION IRRIGATION ONCE
OUTPATIENT
Start: 2025-07-07 | End: 2025-07-07

## 2025-07-07 RX ORDER — GINSENG 100 MG
CAPSULE ORAL ONCE
OUTPATIENT
Start: 2025-07-07 | End: 2025-07-07

## 2025-07-07 RX ORDER — LACTOBACILLUS ACIDOPHILUS 500MM CELL
1 CAPSULE ORAL DAILY
Qty: 14 CAPSULE | Refills: 0 | Status: SHIPPED | OUTPATIENT
Start: 2025-07-07 | End: 2025-07-21

## 2025-07-07 RX ORDER — TRIAMCINOLONE ACETONIDE 1 MG/G
OINTMENT TOPICAL ONCE
OUTPATIENT
Start: 2025-07-07 | End: 2025-07-07

## 2025-07-07 RX ORDER — LIDOCAINE HYDROCHLORIDE 20 MG/ML
JELLY TOPICAL ONCE
OUTPATIENT
Start: 2025-07-07 | End: 2025-07-07

## 2025-07-07 RX ORDER — SULFAMETHOXAZOLE AND TRIMETHOPRIM 800; 160 MG/1; MG/1
1 TABLET ORAL 2 TIMES DAILY
Qty: 28 TABLET | Refills: 0 | Status: SHIPPED | OUTPATIENT
Start: 2025-07-07 | End: 2025-07-21

## 2025-07-07 RX ORDER — NEOMYCIN/BACITRACIN/POLYMYXINB 3.5-400-5K
OINTMENT (GRAM) TOPICAL ONCE
OUTPATIENT
Start: 2025-07-07 | End: 2025-07-07

## 2025-07-07 RX ORDER — LIDOCAINE 50 MG/G
OINTMENT TOPICAL ONCE
OUTPATIENT
Start: 2025-07-07 | End: 2025-07-07

## 2025-07-07 RX ORDER — LIDOCAINE HYDROCHLORIDE 40 MG/ML
SOLUTION TOPICAL ONCE
OUTPATIENT
Start: 2025-07-07 | End: 2025-07-07

## 2025-07-07 RX ORDER — LIDOCAINE HYDROCHLORIDE 40 MG/ML
SOLUTION TOPICAL ONCE
Status: COMPLETED | OUTPATIENT
Start: 2025-07-07 | End: 2025-07-07

## 2025-07-07 RX ORDER — SILVER SULFADIAZINE 10 MG/G
CREAM TOPICAL ONCE
OUTPATIENT
Start: 2025-07-07 | End: 2025-07-07

## 2025-07-07 RX ORDER — BACITRACIN ZINC AND POLYMYXIN B SULFATE 500; 1000 [USP'U]/G; [USP'U]/G
OINTMENT TOPICAL ONCE
OUTPATIENT
Start: 2025-07-07 | End: 2025-07-07

## 2025-07-07 RX ORDER — LIDOCAINE 40 MG/G
CREAM TOPICAL ONCE
OUTPATIENT
Start: 2025-07-07 | End: 2025-07-07

## 2025-07-07 RX ADMIN — LIDOCAINE HYDROCHLORIDE: 40 SOLUTION TOPICAL at 13:37

## 2025-07-07 ASSESSMENT — PAIN SCALES - GENERAL: PAINLEVEL_OUTOF10: 0

## 2025-07-07 NOTE — PATIENT INSTRUCTIONS
[facility] Wound Care Physician Orders and Discharge Instructions  [facility]  [address], [address]  [place], [place] [address]  Telephone: [phone]      FAX [phone]  MONDAY - THURSDAY 8:00 am - 4:30 pm and Friday 8:00 am - 12:00 pm.        NAME:  César Saunders  YOB: 1965  MEDICAL RECORD NUMBER:  [id number]  DATE:  7/7/2025      Return Appointment:  [x] Return Appointment: With Jose Angel Argueta CNP  in  1 Week(s)   [] Wound and dressing supply provider:   [x] ECF or Home Healthcare: [facility]  [] Wound Assessment: [] Physician or NP scheduled for Wound Assessment:   [x] Orders placed during your visit: Continue for 14 more days of Bactrim DS-Script given to patient    BEGIN TAKING BACTRIM DS 2X/DAILY FOR 14 DAYS     Important Reminders:   Please wash hands with soap and water before and after every dressing change.  Do not scrub wounds.  Keep wounds dry in shower unless otherwise instructed by the physician.  SMOKING can slow would healing. Stop smoking as soon as possible to improve healing and prevent further complications associated with smoking.      Angelica-Wound Topical Treatments:  Do not apply lotions, creams, or ointments to wound bed unless directed.   [] Apply moisturizing lotion to skin surrounding the wound prior to dressing change.  [] Apply antifungal ointment to skin surrounding the wound prior to dressing change.  [x] Apply thin film of no sting moisture barrier ointment to skin immediately around  wound. (Skin Prep)  [] Other:     7/7/2025  Pressure Stage 4- Right Buttock 6 cm x 1.5 cm x 0.2cm   Pressure Stage 4- Left Buttock  2 cm x 2.5 cm x 0.1 cm         Wound Location: RIGHT AND LEFT BUTTOCK WOUNDS    Wound Cleansing: Dakins Wash X 5 MINUTE SOAK AT WOUND CARE CENTER ONLY    **TRIAD TO SCROTUM ONLY - APPLY DAILY**    Primary Dressing:  []  [x]Silver Alginate    Secondary Dressing:  [x]Brief  []       Dressing Frequency:  [x]

## 2025-07-07 NOTE — PROGRESS NOTES
[facility]     Note Type: Medical Staff Progress Note    Referring Provider: Self Referral  Reason for Referral: Bilateral buttock wounds    César Saunders  MEDICAL RECORD NUMBER:  [id number]  AGE: 60 y.o.   GENDER: male  : 1965  EPISODE DATE:  2025    Chief complaint and reason for visit:     Chief Complaint   Patient presents with    Wound Check     Follow up for bilateral buttocks wounds.       HPI/Wound Narrative:      César Saunders is a 60 y.o. male who presents today for an evaluation of a wound/ulcer. Wound duration: 2-3 year(s).  Patient presents for returning wound care visit for bilateral chronic gluteal wounds.  Patient resides at [facility] on the medical unit for assistance with dressing changes.  Patient paraplegic and uses a motorized scooter with a high level seating cushion.  History of diabetes, hypertension, obesity, and chronic pressure injuries.  Current treatment: unknown.  He denies pain, fever, chills and/or nausea/vomiting    Wound/Ulcer Pain Timing/Severity: none  Quality of pain: N/A  Severity:  0 / 10   Modifying Factors: None  Associated Signs/Symptoms: none    2025: Left lower leg wound healed.  Will continue to keep patient in multilayer wrap to manage bilateral lower extremity edema.  Oral Bactrim DS extended x 14 days for wound infection and improved wound and periwound appearance.  Lactobacillus ordered with oral antibiotic.  Follow-up in 1 week.    2025: Right lower leg wound healed.  No issues or concerns reported.  Patient remains on oral Bactrim DS for wound infection results reviewed-findings positive for Proteus Mirabilis, E. coli, staph RES MRSA light growth.  Will continue current treatment.  Follow-up in 1 week.    2025: New wounds noted to bilateral lower extremities related to nonpitting edema and suspected lymphedema.  Discussed goals of care with patient at length-patient refusing

## 2025-07-14 ENCOUNTER — APPOINTMENT (OUTPATIENT)
Dept: WOUND CARE | Age: 60
End: 2025-07-14
Payer: MEDICAID

## 2025-07-18 ENCOUNTER — HOSPITAL ENCOUNTER (OUTPATIENT)
Dept: WOUND CARE | Age: 60
Discharge: HOME | End: 2025-07-18
Payer: MEDICAID

## 2025-07-18 VITALS
DIASTOLIC BLOOD PRESSURE: 74 MMHG | RESPIRATION RATE: 18 BRPM | SYSTOLIC BLOOD PRESSURE: 138 MMHG | TEMPERATURE: 97.2 F | HEART RATE: 62 BPM

## 2025-07-18 DIAGNOSIS — L97.929 VENOUS ULCER OF LEFT LEG (HCC): ICD-10-CM

## 2025-07-18 DIAGNOSIS — L03.317 CELLULITIS OF GLUTEAL REGION: ICD-10-CM

## 2025-07-18 DIAGNOSIS — L25.8 DERMATITIS ASSOCIATED WITH MOISTURE FROM STOOL INCONTINENCE: ICD-10-CM

## 2025-07-18 DIAGNOSIS — I83.029 VENOUS ULCER OF LEFT LEG (HCC): ICD-10-CM

## 2025-07-18 DIAGNOSIS — E11.628 TYPE 2 DIABETES MELLITUS WITH OTHER SKIN COMPLICATION, WITH LONG-TERM CURRENT USE OF INSULIN (HCC): ICD-10-CM

## 2025-07-18 DIAGNOSIS — L89.314 PRESSURE ULCER OF RIGHT BUTTOCK, STAGE 4 (HCC): ICD-10-CM

## 2025-07-18 DIAGNOSIS — Z79.4 TYPE 2 DIABETES MELLITUS WITH OTHER SKIN COMPLICATION, WITH LONG-TERM CURRENT USE OF INSULIN (HCC): ICD-10-CM

## 2025-07-18 DIAGNOSIS — R15.9 DERMATITIS ASSOCIATED WITH MOISTURE FROM STOOL INCONTINENCE: ICD-10-CM

## 2025-07-18 DIAGNOSIS — G82.20 PARAPLEGIA (HCC): ICD-10-CM

## 2025-07-18 DIAGNOSIS — L89.324 PRESSURE ULCER OF LEFT BUTTOCK, STAGE 4 (HCC): Primary | ICD-10-CM

## 2025-07-18 PROCEDURE — 29581 APPL MULTLAYER CMPRN SYS LEG: CPT

## 2025-07-18 PROCEDURE — 11043 DBRDMT MUSC&/FSCA 1ST 20/<: CPT

## 2025-07-18 RX ORDER — LIDOCAINE HYDROCHLORIDE 20 MG/ML
JELLY TOPICAL ONCE
OUTPATIENT
Start: 2025-07-18 | End: 2025-07-18

## 2025-07-18 RX ORDER — BACITRACIN ZINC AND POLYMYXIN B SULFATE 500; 1000 [USP'U]/G; [USP'U]/G
OINTMENT TOPICAL ONCE
OUTPATIENT
Start: 2025-07-18 | End: 2025-07-18

## 2025-07-18 RX ORDER — GINSENG 100 MG
CAPSULE ORAL ONCE
OUTPATIENT
Start: 2025-07-18 | End: 2025-07-18

## 2025-07-18 RX ORDER — LIDOCAINE HYDROCHLORIDE 40 MG/ML
SOLUTION TOPICAL ONCE
OUTPATIENT
Start: 2025-07-18 | End: 2025-07-18

## 2025-07-18 RX ORDER — LIDOCAINE 40 MG/G
CREAM TOPICAL ONCE
OUTPATIENT
Start: 2025-07-18 | End: 2025-07-18

## 2025-07-18 RX ORDER — LIDOCAINE HYDROCHLORIDE 40 MG/ML
SOLUTION TOPICAL ONCE
Status: COMPLETED | OUTPATIENT
Start: 2025-07-18 | End: 2025-07-18

## 2025-07-18 RX ORDER — GENTAMICIN SULFATE 1 MG/G
OINTMENT TOPICAL ONCE
OUTPATIENT
Start: 2025-07-18 | End: 2025-07-18

## 2025-07-18 RX ORDER — SODIUM CHLOR/HYPOCHLOROUS ACID 0.033 %
SOLUTION, IRRIGATION IRRIGATION ONCE
OUTPATIENT
Start: 2025-07-18 | End: 2025-07-18

## 2025-07-18 RX ORDER — CLOBETASOL PROPIONATE 0.5 MG/G
OINTMENT TOPICAL ONCE
OUTPATIENT
Start: 2025-07-18 | End: 2025-07-18

## 2025-07-18 RX ORDER — SILVER SULFADIAZINE 10 MG/G
CREAM TOPICAL ONCE
OUTPATIENT
Start: 2025-07-18 | End: 2025-07-18

## 2025-07-18 RX ORDER — BETAMETHASONE DIPROPIONATE 0.5 MG/G
CREAM TOPICAL ONCE
OUTPATIENT
Start: 2025-07-18 | End: 2025-07-18

## 2025-07-18 RX ORDER — LIDOCAINE 50 MG/G
OINTMENT TOPICAL ONCE
OUTPATIENT
Start: 2025-07-18 | End: 2025-07-18

## 2025-07-18 RX ORDER — MUPIROCIN 2 %
OINTMENT (GRAM) TOPICAL ONCE
OUTPATIENT
Start: 2025-07-18 | End: 2025-07-18

## 2025-07-18 RX ORDER — NEOMYCIN/BACITRACIN/POLYMYXINB 3.5-400-5K
OINTMENT (GRAM) TOPICAL ONCE
OUTPATIENT
Start: 2025-07-18 | End: 2025-07-18

## 2025-07-18 RX ORDER — TRIAMCINOLONE ACETONIDE 1 MG/G
OINTMENT TOPICAL ONCE
OUTPATIENT
Start: 2025-07-18 | End: 2025-07-18

## 2025-07-18 RX ADMIN — LIDOCAINE HYDROCHLORIDE: 40 SOLUTION TOPICAL at 09:36

## 2025-07-18 ASSESSMENT — PAIN SCALES - GENERAL
PAINLEVEL_OUTOF10: 0
PAINLEVEL_OUTOF10: 0

## 2025-07-18 NOTE — PATIENT INSTRUCTIONS
[facility] [place] Wound Care Physician Orders and Discharge Instructions  [facility]  [address]  [place], [place] [address]  Telephone: [phone]      FAX [phone]  MONDAY - THURSDAY 8:00 am - 4:30 pm and Friday 8:00 am - 12:00 pm.        NAME:  César Saunders  YOB: 1965  MEDICAL RECORD NUMBER:  [id number]  DATE:  7/18/2025      Return Appointment:  [x] Return Appointment: With Jose Angel Argueta CNP  in  1 Week(s) ON FRIDAY  [] Wound and dressing supply provider:   [x] ECF or Home Healthcare: [facility]  ( FAX # [phone] )  [] Wound Assessment: [] Physician or NP scheduled for Wound Assessment:   [] Orders placed during your visit:     COMPLETE  BACTRIM DS 2X/DAILY FOR 14 DAYS TOTAL    Important Reminders:   Please wash hands with soap and water before and after every dressing change.  Do not scrub wounds.  Keep wounds dry in shower unless otherwise instructed by the physician.  SMOKING can slow would healing. Stop smoking as soon as possible to improve healing and prevent further complications associated with smoking.      Angelica-Wound Topical Treatments:  Do not apply lotions, creams, or ointments to wound bed unless directed.   [] Apply moisturizing lotion to skin surrounding the wound prior to dressing change.  [] Apply antifungal ointment to skin surrounding the wound prior to dressing change.  [x] Apply thin film of no sting moisture barrier ointment to skin immediately around  wound. (Skin Prep)  [] Other:     7/18/2025  Pressure Stage 4- Right Buttock 6.6 cm x 1.2 cm x 0.2cm    Left Buttock - HEALED        Wound Location:  RIGHT BUTTOCK WOUND    Wound Cleansing: VASHE X 5 MINUTE SOAK AT WOUND CARE CENTER ONLY    **TRIAD TO SCROTUM AND LEFT BUTTOCK ONLY - APPLY DAILY**    Primary Dressing:  []  [x]Silver Alginate    Secondary Dressing:  [x]Brief  []       Dressing Frequency:  [x] DAILY      Compression and Edema Control:  [] Wear Home

## 2025-07-18 NOTE — PROGRESS NOTES
[facility]     Note Type: Medical Staff Progress Note    Referring Provider: Self Referral  Reason for Referral: Bilateral buttock wounds    César aSunders  MEDICAL RECORD NUMBER:  [id number]  AGE: 60 y.o.   GENDER: male  : 1965  EPISODE DATE:  2025    Chief complaint and reason for visit:     Chief Complaint   Patient presents with    Wound Check     Follow up visit for buttock wounds      HPI/Wound Narrative:      César Saunders is a 60 y.o. male who presents today for an evaluation of a wound/ulcer. Wound duration: 2-3 year(s).  Patient presents for returning wound care visit for bilateral chronic gluteal wounds.  Patient resides at [facility] on the medical unit for assistance with dressing changes.  Patient paraplegic and uses a motorized scooter with a high level seating cushion.  History of diabetes, hypertension, obesity, and chronic pressure injuries.  Current treatment: unknown.  He denies pain, fever, chills and/or nausea/vomiting    Wound/Ulcer Pain Timing/Severity: none  Quality of pain: N/A  Severity:  0 / 10   Modifying Factors: None  Associated Signs/Symptoms: none    2025: Left buttock wound healed.  Patient has 1 week left on oral Bactrim DS.  Follow-up in 1 week    2025: Left lower leg wound healed.  Will continue to keep patient in multilayer wrap to manage bilateral lower extremity edema.  Oral Bactrim DS extended x 14 days for wound infection and improved wound and periwound appearance.  Lactobacillus ordered with oral antibiotic.  Follow-up in 1 week.    2025: Right lower leg wound healed.  No issues or concerns reported.  Patient remains on oral Bactrim DS for wound infection results reviewed-findings positive for Proteus Mirabilis, E. coli, staph RES MRSA light growth.  Will continue current treatment.  Follow-up in 1 week.    2025: New wounds noted to bilateral lower extremities related to

## 2025-07-18 NOTE — PLAN OF CARE
Multilayer Compression Wrap   (Not Unna) Below the Knee    NAME:  César Saunders  YOB: 1965  MEDICAL RECORD NUMBER:  [id number]  DATE:  7/18/2025    Multilayer compression wrap: Removed old Multilayer wrap if indicated and wash leg with mild soap/water.  Applied moisturizing agent to dry skin as needed.   Applied primary and secondary dressing as ordered.  Applied multilayered dressing below the knee to right lower leg.  Applied multilayered dressing below the knee to left lower leg.  Instructed patient/caregiver not to remove dressing and to keep it clean and dry.   Instructed patient/caregiver on complications to report to provider, such as pain, numbness in toes, heavy drainage, and slippage of dressing.  Instructed patient on purpose of compression dressing and on activity and exercise recommendations.      Electronically signed by Debbi Abdi RN on 7/18/2025 at 10:33 AM

## 2025-07-28 ENCOUNTER — TELEPHONE (OUTPATIENT)
Dept: WOUND CARE | Age: 60
End: 2025-07-28

## 2025-07-28 DIAGNOSIS — L89.324 PRESSURE ULCER OF LEFT BUTTOCK, STAGE 4 (HCC): ICD-10-CM

## 2025-07-28 DIAGNOSIS — I83.029 VENOUS ULCER OF LEFT LEG (HCC): ICD-10-CM

## 2025-07-28 DIAGNOSIS — L89.314 PRESSURE ULCER OF RIGHT BUTTOCK, STAGE 4 (HCC): Primary | ICD-10-CM

## 2025-07-28 DIAGNOSIS — L97.929 VENOUS ULCER OF LEFT LEG (HCC): ICD-10-CM

## 2025-07-28 NOTE — TELEPHONE ENCOUNTER
Patient missed appointment today due to transport van breaking down. Spoke with Nurse Blankenship at [facility] in regards to removing Unna Compression wraps on patient and applying dry dressing and Ace Wraps Daily until next appointment.